# Patient Record
Sex: FEMALE | Race: BLACK OR AFRICAN AMERICAN | NOT HISPANIC OR LATINO | Employment: OTHER | URBAN - METROPOLITAN AREA
[De-identification: names, ages, dates, MRNs, and addresses within clinical notes are randomized per-mention and may not be internally consistent; named-entity substitution may affect disease eponyms.]

---

## 2019-01-04 ENCOUNTER — OFFICE VISIT (OUTPATIENT)
Dept: FAMILY MEDICINE CLINIC | Facility: CLINIC | Age: 49
End: 2019-01-04

## 2019-01-04 VITALS
DIASTOLIC BLOOD PRESSURE: 100 MMHG | WEIGHT: 212 LBS | HEART RATE: 98 BPM | BODY MASS INDEX: 41.4 KG/M2 | SYSTOLIC BLOOD PRESSURE: 124 MMHG | RESPIRATION RATE: 16 BRPM | TEMPERATURE: 98.8 F | OXYGEN SATURATION: 98 %

## 2019-01-04 DIAGNOSIS — I10 ESSENTIAL HYPERTENSION: ICD-10-CM

## 2019-01-04 DIAGNOSIS — H65.92 LEFT OTITIS MEDIA WITH EFFUSION: ICD-10-CM

## 2019-01-04 DIAGNOSIS — R82.998 LEUKOCYTES IN URINE: ICD-10-CM

## 2019-01-04 DIAGNOSIS — J30.9 ALLERGIC RHINITIS, UNSPECIFIED SEASONALITY, UNSPECIFIED TRIGGER: ICD-10-CM

## 2019-01-04 DIAGNOSIS — E11.9 TYPE 2 DIABETES MELLITUS WITHOUT COMPLICATION, WITHOUT LONG-TERM CURRENT USE OF INSULIN (HCC): ICD-10-CM

## 2019-01-04 DIAGNOSIS — R53.83 FATIGUE, UNSPECIFIED TYPE: Primary | ICD-10-CM

## 2019-01-04 PROBLEM — C50.919 BREAST CANCER: Status: ACTIVE | Noted: 2019-01-04

## 2019-01-04 PROBLEM — F41.9 ANXIETY AND DEPRESSION: Status: ACTIVE | Noted: 2019-01-04

## 2019-01-04 PROBLEM — H91.91 DEAFNESS IN RIGHT EAR: Status: ACTIVE | Noted: 2019-01-04

## 2019-01-04 PROBLEM — F41.1 GENERALIZED ANXIETY DISORDER: Status: ACTIVE | Noted: 2019-01-04

## 2019-01-04 PROBLEM — F32.A ANXIETY AND DEPRESSION: Status: ACTIVE | Noted: 2019-01-04

## 2019-01-04 LAB
ALBUMIN SERPL-MCNC: 4.95 G/DL (ref 3.2–4.8)
ALBUMIN/GLOB SERPL: 2.1 G/DL (ref 1.5–2.5)
ALP SERPL-CCNC: 113 U/L (ref 25–100)
ALT SERPL-CCNC: 45 U/L (ref 7–40)
AST SERPL-CCNC: 25 U/L (ref 0–33)
BASOPHILS # BLD AUTO: 0.02 10*3/MM3 (ref 0–0.2)
BASOPHILS NFR BLD AUTO: 0.3 % (ref 0–1)
BILIRUB BLD-MCNC: NEGATIVE MG/DL
BILIRUB SERPL-MCNC: 0.5 MG/DL (ref 0.3–1.2)
BUN SERPL-MCNC: 17 MG/DL (ref 9–23)
BUN/CREAT SERPL: 19.8 (ref 7–25)
CALCIUM SERPL-MCNC: 10 MG/DL (ref 8.7–10.4)
CHLORIDE SERPL-SCNC: 101 MMOL/L (ref 99–109)
CLARITY, POC: CLEAR
CO2 SERPL-SCNC: 30 MMOL/L (ref 20–31)
COLOR UR: YELLOW
CREAT SERPL-MCNC: 0.86 MG/DL (ref 0.6–1.3)
EOSINOPHIL # BLD AUTO: 0.09 10*3/MM3 (ref 0–0.3)
EOSINOPHIL NFR BLD AUTO: 1.3 % (ref 0–3)
ERYTHROCYTE [DISTWIDTH] IN BLOOD BY AUTOMATED COUNT: 13.1 % (ref 11.3–14.5)
GLOBULIN SER CALC-MCNC: 2.4 GM/DL
GLUCOSE SERPL-MCNC: 117 MG/DL (ref 70–100)
GLUCOSE UR STRIP-MCNC: NEGATIVE MG/DL
HCT VFR BLD AUTO: 43.3 % (ref 34.5–44)
HGB BLD-MCNC: 14.1 G/DL (ref 11.5–15.5)
IMM GRANULOCYTES # BLD AUTO: 0.03 10*3/MM3 (ref 0–0.03)
IMM GRANULOCYTES NFR BLD AUTO: 0.4 % (ref 0–0.6)
KETONES UR QL: NEGATIVE
LEUKOCYTE EST, POC: NEGATIVE
LYMPHOCYTES # BLD AUTO: 2.7 10*3/MM3 (ref 0.6–4.8)
LYMPHOCYTES NFR BLD AUTO: 38.8 % (ref 24–44)
MCH RBC QN AUTO: 28.4 PG (ref 27–31)
MCHC RBC AUTO-ENTMCNC: 32.6 G/DL (ref 32–36)
MCV RBC AUTO: 87.3 FL (ref 80–99)
MONOCYTES # BLD AUTO: 0.39 10*3/MM3 (ref 0–1)
MONOCYTES NFR BLD AUTO: 5.6 % (ref 0–12)
NEUTROPHILS # BLD AUTO: 3.72 10*3/MM3 (ref 1.5–8.3)
NEUTROPHILS NFR BLD AUTO: 53.6 % (ref 41–71)
NITRITE UR-MCNC: NEGATIVE MG/ML
PH UR: 6.5 [PH] (ref 5–8)
PLATELET # BLD AUTO: 319 10*3/MM3 (ref 150–450)
POC CREATININE URINE: NORMAL
POC MICROALBUMIN URINE: NORMAL
POTASSIUM SERPL-SCNC: 4.2 MMOL/L (ref 3.5–5.5)
PROT SERPL-MCNC: 7.3 G/DL (ref 5.7–8.2)
PROT UR STRIP-MCNC: NEGATIVE MG/DL
RBC # BLD AUTO: 4.96 10*6/MM3 (ref 3.89–5.14)
RBC # UR STRIP: NEGATIVE /UL
SODIUM SERPL-SCNC: 139 MMOL/L (ref 132–146)
SP GR UR: 1.02 (ref 1–1.03)
TSH SERPL DL<=0.005 MIU/L-ACNC: 1.99 MIU/ML (ref 0.35–5.35)
UROBILINOGEN UR QL: NORMAL
WBC # BLD AUTO: 6.95 10*3/MM3 (ref 3.5–10.8)

## 2019-01-04 PROCEDURE — 81003 URINALYSIS AUTO W/O SCOPE: CPT | Performed by: NURSE PRACTITIONER

## 2019-01-04 PROCEDURE — 82044 UR ALBUMIN SEMIQUANTITATIVE: CPT | Performed by: NURSE PRACTITIONER

## 2019-01-04 PROCEDURE — 99214 OFFICE O/P EST MOD 30 MIN: CPT | Performed by: NURSE PRACTITIONER

## 2019-01-04 PROCEDURE — 36415 COLL VENOUS BLD VENIPUNCTURE: CPT | Performed by: NURSE PRACTITIONER

## 2019-01-04 RX ORDER — LISINOPRIL 10 MG/1
10 TABLET ORAL DAILY
Qty: 30 TABLET | Refills: 1 | Status: SHIPPED | OUTPATIENT
Start: 2019-01-04 | End: 2019-02-06 | Stop reason: SDUPTHER

## 2019-01-04 RX ORDER — CETIRIZINE HYDROCHLORIDE 10 MG/1
10 TABLET ORAL DAILY
Qty: 30 TABLET | Refills: 11 | Status: SHIPPED | OUTPATIENT
Start: 2019-01-04 | End: 2020-02-05

## 2019-01-04 RX ORDER — AMOXICILLIN AND CLAVULANATE POTASSIUM 875; 125 MG/1; MG/1
1 TABLET, FILM COATED ORAL 2 TIMES DAILY
Qty: 20 TABLET | Refills: 0 | Status: SHIPPED | OUTPATIENT
Start: 2019-01-04 | End: 2019-01-14

## 2019-01-04 RX ORDER — TRETINOIN 0.1 MG/G
GEL TOPICAL
COMMUNITY
End: 2021-11-09

## 2019-01-04 RX ORDER — TIZANIDINE 4 MG/1
4 TABLET ORAL NIGHTLY PRN
Qty: 30 TABLET | Refills: 1 | Status: SHIPPED | OUTPATIENT
Start: 2019-01-04 | End: 2019-02-06 | Stop reason: SDUPTHER

## 2019-01-04 RX ORDER — DESONIDE 0.5 MG/G
CREAM TOPICAL
COMMUNITY
Start: 2013-06-30 | End: 2021-11-09

## 2019-01-04 RX ORDER — HYDROCHLOROTHIAZIDE 12.5 MG/1
CAPSULE, GELATIN COATED ORAL
Refills: 0 | COMMUNITY
Start: 2018-12-21 | End: 2019-01-04 | Stop reason: ALTCHOICE

## 2019-01-04 RX ORDER — DULAGLUTIDE 1.5 MG/.5ML
INJECTION, SOLUTION SUBCUTANEOUS
COMMUNITY
Start: 2018-12-30 | End: 2019-05-08 | Stop reason: ALTCHOICE

## 2019-01-04 RX ORDER — LANCETS
EACH MISCELLANEOUS
Refills: 11 | COMMUNITY
Start: 2018-10-03

## 2019-01-04 RX ORDER — FLUTICASONE PROPIONATE 50 MCG
2 SPRAY, SUSPENSION (ML) NASAL DAILY
Qty: 1 BOTTLE | Refills: 11 | Status: SHIPPED | OUTPATIENT
Start: 2019-01-04 | End: 2020-01-05

## 2019-01-04 RX ORDER — TRAZODONE HYDROCHLORIDE 50 MG/1
50 TABLET ORAL NIGHTLY
Qty: 30 TABLET | Refills: 1 | Status: SHIPPED | OUTPATIENT
Start: 2019-01-04 | End: 2019-02-06 | Stop reason: SDUPTHER

## 2019-01-04 NOTE — PROGRESS NOTES
"Teena Jacobson is a 48 y.o. female who presents for evaluation of hypertension and to establish care.    Chief Complaint   Patient presents with   • Hypertension       Patient's blood pressure has been elevated since August of 2018.  The highest reading that she is aware of was 170/110. She gets headaches and nausea occasionally. She has been under a lot of stress recently with her  and father being ill. She has had a lot of trouble sleeping and is having an increase in depression and anxiety. She gets very emotional and is actually tearful during the interview. She is also having difficulty with her allergies. Her nose and sinuses feel very inflamed and her ears feel \"full and stopped up\". She does not take anything for them on a daily basis.           Past Medical History:   Diagnosis Date   • Cancer (CMS/[facility])     breast, chemo, radiation done   • Diabetes mellitus (CMS/[facility])    • H/O mastectomy        Past Surgical History:   Procedure Laterality Date   • MASTECTOMY PARTIAL / LUMPECTOMY Left 2009       History reviewed. No pertinent family history.    Social History     Socioeconomic History   • Marital status:      Spouse name: Not on file   • Number of children: Not on file   • Years of education: Not on file   • Highest education level: Not on file   Social Needs   • Financial resource strain: Not on file   • Food insecurity - worry: Not on file   • Food insecurity - inability: Not on file   • Transportation needs - medical: Not on file   • Transportation needs - non-medical: Not on file   Occupational History   • Not on file   Tobacco Use   • Smoking status: Never Smoker   Substance and Sexual Activity   • Alcohol use: No   • Drug use: No   • Sexual activity: Defer   Other Topics Concern   • Not on file   Social History Narrative   • Not on file       No Known Allergies    ROS    Review of Systems   Constitutional: Positive for fatigue.   HENT: Positive for congestion, hearing loss, postnasal " drip, rhinorrhea, sinus pressure and sneezing.    Eyes: Positive for discharge, redness and itching.   Musculoskeletal: Positive for arthralgias, back pain and joint swelling.   Allergic/Immunologic: Positive for environmental allergies.   Neurological: Positive for light-headedness and headache.   Psychiatric/Behavioral: Positive for sleep disturbance, depressed mood and stress. The patient is nervous/anxious.    All other systems reviewed and are negative.      Vitals:    01/04/19 0906   BP: 124/100   Pulse: 98   Resp: 16   Temp: 98.8 °F (37.1 °C)   SpO2: 98%   Weight: 96.2 kg (212 lb)   PainSc: 0-No pain         Current Outpatient Medications:   •  desonide (DESOWEN) 0.05 % cream, desonide 0.05 % topical cream  APPLY TO AFFECTED AREA ON EYELIDS AND FACE BID X 2 WEEKS PRN FLARES, Disp: , Rfl:   •  metFORMIN ER (GLUCOPHAGE-XR) 750 MG 24 hr tablet, Take 750 mg by mouth Daily With Breakfast., Disp: , Rfl:   •  TRULICITY 1.5 MG/0.5ML solution pen-injector, , Disp: , Rfl:   •  ACCU-CHEK FASTCLIX LANCETS misc, USE TO TEST BLOOD GLUCOSE THREE TIMES DAILY, Disp: , Rfl: 11  •  amoxicillin-clavulanate (AUGMENTIN) 875-125 MG per tablet, Take 1 tablet by mouth 2 (Two) Times a Day for 10 days., Disp: 20 tablet, Rfl: 0  •  cetirizine (zyrTEC) 10 MG tablet, Take 1 tablet by mouth Daily., Disp: 30 tablet, Rfl: 11  •  fluticasone (FLONASE) 50 MCG/ACT nasal spray, 2 sprays into the nostril(s) as directed by provider Daily., Disp: 1 bottle, Rfl: 11  •  lisinopril (PRINIVIL,ZESTRIL) 10 MG tablet, Take 1 tablet by mouth Daily., Disp: 30 tablet, Rfl: 1  •  metFORMIN (GLUCOPHAGE) 1000 MG tablet, Glucophage  Two times a day, Disp: , Rfl:   •  neomycin-polymyxin-hydrocortisone (CORTISPORIN) 3.5-02132-5 otic solution, Administer 3 drops into the left ear 3 (Three) Times a Day., Disp: 10 mL, Rfl: 0  •  tiZANidine (ZANAFLEX) 4 MG tablet, Take 1 tablet by mouth At Night As Needed for Muscle Spasms., Disp: 30 tablet, Rfl: 1  •  traZODone  (DESYREL) 50 MG tablet, Take 1 tablet by mouth Every Night., Disp: 30 tablet, Rfl: 1  •  tretinoin (RETIN-A) 0.01 % gel, tretinoin 0.01 % topical gel  APPLY TO THE AFFECTED AREA(S) ON FACE BY TOPICAL ROUTE ONCE DAILY AT BEDTIME, Disp: , Rfl:     PE    Physical Exam   Constitutional: She is oriented to person, place, and time. She appears well-developed. She is obese.  HENT:   Head: Normocephalic and atraumatic.   Right Ear: External ear normal. Decreased hearing is noted. cerumen impaction is present.  Left Ear: External ear normal. There is tenderness. Tympanic membrane is erythematous and bulging. A middle ear effusion is present.   Nose: Mucosal edema, rhinorrhea and congestion present.   Mouth/Throat: Uvula is midline and mucous membranes are normal. Posterior oropharyngeal erythema present.   Nasal mucosa is extremely erythematous and edematous left greater than right.   Eyes: Conjunctivae and EOM are normal. Pupils are equal, round, and reactive to light.   Neck: Normal range of motion. Neck supple.   Cardiovascular: Normal rate, regular rhythm, normal heart sounds and intact distal pulses.   Pulmonary/Chest: Effort normal and breath sounds normal. No stridor. No respiratory distress. She has no wheezes. She has no rales.   Lymphadenopathy:     She has cervical adenopathy.   Neurological: She is alert and oriented to person, place, and time.   Skin: Skin is warm and dry. Capillary refill takes less than 2 seconds. Turgor is normal.   Psychiatric: Her speech is normal and behavior is normal. Judgment and thought content normal. Her mood appears anxious. She exhibits a depressed mood.   Nursing note and vitals reviewed.       SMITA/P    Teena was seen today for hypertension.    Diagnoses and all orders for this visit:    Fatigue, unspecified type  -     TSH  -     Comprehensive Metabolic Panel  -     CBC Auto Differential    Essential hypertension  -     lisinopril (PRINIVIL,ZESTRIL) 10 MG tablet; Take 1 tablet by  mouth Daily.    Type 2 diabetes mellitus without complication, without long-term current use of insulin (CMS/[facility])  -     Cancel: MicroAlbumin, Urine, Random - Urine, Clean Catch    Leukocytes in urine  -     POCT urinalysis dipstick, automated  -     POCT microalbumin    Allergic rhinitis, unspecified seasonality, unspecified trigger  -     fluticasone (FLONASE) 50 MCG/ACT nasal spray; 2 sprays into the nostril(s) as directed by provider Daily.  -     cetirizine (zyrTEC) 10 MG tablet; Take 1 tablet by mouth Daily.    Left otitis media with effusion  -     amoxicillin-clavulanate (AUGMENTIN) 875-125 MG per tablet; Take 1 tablet by mouth 2 (Two) Times a Day for 10 days.  -     neomycin-polymyxin-hydrocortisone (CORTISPORIN) 3.5-43753-4 otic solution; Administer 3 drops into the left ear 3 (Three) Times a Day.    Other orders  -     tiZANidine (ZANAFLEX) 4 MG tablet; Take 1 tablet by mouth At Night As Needed for Muscle Spasms.  -     traZODone (DESYREL) 50 MG tablet; Take 1 tablet by mouth Every Night.        Plan of care reviewed with patient at the conclusion of today's visit. Education was provided regarding diagnosis, management and any prescribed or recommended OTC medications.  Patient verbalizes understanding of and agreement with management plan.    Return in about 4 weeks (around 2/1/2019) for Recheck.     JAGDEEP Kan

## 2019-01-08 DIAGNOSIS — B37.31 VAGINAL CANDIDIASIS: Primary | ICD-10-CM

## 2019-01-08 RX ORDER — FLUCONAZOLE 200 MG/1
TABLET ORAL
Qty: 2 TABLET | Refills: 0 | Status: SHIPPED | OUTPATIENT
Start: 2019-01-08 | End: 2019-02-06

## 2019-01-09 ENCOUNTER — TELEPHONE (OUTPATIENT)
Dept: FAMILY MEDICINE CLINIC | Facility: CLINIC | Age: 49
End: 2019-01-09

## 2019-01-09 NOTE — TELEPHONE ENCOUNTER
----- Message from JAGDEEP Tobias sent at 1/8/2019  7:05 PM EST -----  Regarding: RE: Yeast infection  Please call patient and let her know that I called this in for her.  Thanks  ----- Message -----  From: Carol Lee MA  Sent: 1/7/2019   1:32 PM  To: JAGDEEP Tobias  Subject: Yeast infection                                  Pt was in on Friday and was prescribed an antibiotic and now needs something for yeast infection.  Please send to pharmacy in chart    Pt notified that an rx has been sent in.  BBailey, com

## 2019-02-06 ENCOUNTER — OFFICE VISIT (OUTPATIENT)
Dept: FAMILY MEDICINE CLINIC | Facility: CLINIC | Age: 49
End: 2019-02-06

## 2019-02-06 VITALS
HEIGHT: 60 IN | WEIGHT: 212.16 LBS | RESPIRATION RATE: 16 BRPM | BODY MASS INDEX: 41.65 KG/M2 | OXYGEN SATURATION: 97 % | SYSTOLIC BLOOD PRESSURE: 118 MMHG | DIASTOLIC BLOOD PRESSURE: 80 MMHG | TEMPERATURE: 98.3 F | HEART RATE: 105 BPM

## 2019-02-06 DIAGNOSIS — I10 ESSENTIAL HYPERTENSION: ICD-10-CM

## 2019-02-06 DIAGNOSIS — M62.838 MUSCLE SPASM: ICD-10-CM

## 2019-02-06 DIAGNOSIS — G47.00 INSOMNIA, UNSPECIFIED TYPE: ICD-10-CM

## 2019-02-06 DIAGNOSIS — J01.00 ACUTE NON-RECURRENT MAXILLARY SINUSITIS: ICD-10-CM

## 2019-02-06 DIAGNOSIS — R09.81 SINUS CONGESTION: ICD-10-CM

## 2019-02-06 DIAGNOSIS — J30.9 ALLERGIC RHINITIS, UNSPECIFIED SEASONALITY, UNSPECIFIED TRIGGER: Primary | ICD-10-CM

## 2019-02-06 PROCEDURE — 99213 OFFICE O/P EST LOW 20 MIN: CPT | Performed by: NURSE PRACTITIONER

## 2019-02-06 RX ORDER — MONTELUKAST SODIUM 10 MG/1
10 TABLET ORAL NIGHTLY
Qty: 30 TABLET | Refills: 2 | Status: SHIPPED | OUTPATIENT
Start: 2019-02-06 | End: 2019-10-03 | Stop reason: SDUPTHER

## 2019-02-06 RX ORDER — LISINOPRIL 10 MG/1
10 TABLET ORAL DAILY
Qty: 30 TABLET | Refills: 5 | Status: SHIPPED | OUTPATIENT
Start: 2019-02-06 | End: 2019-04-23 | Stop reason: DRUGHIGH

## 2019-02-06 RX ORDER — TRAZODONE HYDROCHLORIDE 50 MG/1
50 TABLET ORAL NIGHTLY
Qty: 30 TABLET | Refills: 5 | Status: SHIPPED | OUTPATIENT
Start: 2019-02-06 | End: 2019-10-03 | Stop reason: SDUPTHER

## 2019-02-06 RX ORDER — TIZANIDINE 4 MG/1
4 TABLET ORAL NIGHTLY PRN
Qty: 30 TABLET | Refills: 5 | Status: SHIPPED | OUTPATIENT
Start: 2019-02-06 | End: 2019-10-03 | Stop reason: SDUPTHER

## 2019-02-06 RX ORDER — METFORMIN HYDROCHLORIDE 500 MG/1
TABLET, EXTENDED RELEASE ORAL
COMMUNITY
Start: 2019-02-04 | End: 2019-10-03 | Stop reason: SDUPTHER

## 2019-02-06 RX ORDER — PSEUDOEPHEDRINE HYDROCHLORIDE 60 MG/1
60 TABLET, FILM COATED ORAL EVERY 4 HOURS PRN
Qty: 30 TABLET | Refills: 2 | Status: SHIPPED | OUTPATIENT
Start: 2019-02-06 | End: 2019-06-18

## 2019-02-06 NOTE — PROGRESS NOTES
"Teena Jacobson is a 49 y.o. female who presents for follow up of hypertension and allergic rhinitis.    Chief Complaint   Patient presents with   • Hypertension       Patient states that her blood pressure has been much better and she has felt better. Her allergy symptoms have been giving her problems. She states that she has good days and bad days and sometimes feels like the medication is not helping and other days is helps more.          Past Medical History:   Diagnosis Date   • Cancer (CMS/[facility])     breast, chemo, radiation done   • Diabetes mellitus (CMS/[facility])    • H/O mastectomy        Past Surgical History:   Procedure Laterality Date   • MASTECTOMY PARTIAL / LUMPECTOMY Left 2009       History reviewed. No pertinent family history.    Social History     Socioeconomic History   • Marital status:      Spouse name: Not on file   • Number of children: Not on file   • Years of education: Not on file   • Highest education level: Not on file   Social Needs   • Financial resource strain: Not on file   • Food insecurity - worry: Not on file   • Food insecurity - inability: Not on file   • Transportation needs - medical: Not on file   • Transportation needs - non-medical: Not on file   Occupational History   • Not on file   Tobacco Use   • Smoking status: Never Smoker   • Smokeless tobacco: Never Used   Substance and Sexual Activity   • Alcohol use: No   • Drug use: No   • Sexual activity: Defer   Other Topics Concern   • Not on file   Social History Narrative   • Not on file       No Known Allergies    ROS    Review of Systems   HENT: Positive for congestion, postnasal drip, rhinorrhea and sneezing.    Eyes: Positive for discharge.   All other systems reviewed and are negative.      Vitals:    02/06/19 1355   BP: 118/80   Pulse: 105   Resp: 16   Temp: 98.3 °F (36.8 °C)   TempSrc: Oral   SpO2: 97%   Weight: 96.2 kg (212 lb 2.6 oz)   Height: 152.4 cm (60\")   PainSc: 0-No pain         Current Outpatient " Medications:   •  ACCU-CHEK FASTCLIX LANCETS misc, USE TO TEST BLOOD GLUCOSE THREE TIMES DAILY, Disp: , Rfl: 11  •  cetirizine (zyrTEC) 10 MG tablet, Take 1 tablet by mouth Daily., Disp: 30 tablet, Rfl: 11  •  desonide (DESOWEN) 0.05 % cream, desonide 0.05 % topical cream  APPLY TO AFFECTED AREA ON EYELIDS AND FACE BID X 2 WEEKS PRN FLARES, Disp: , Rfl:   •  fluticasone (FLONASE) 50 MCG/ACT nasal spray, 2 sprays into the nostril(s) as directed by provider Daily., Disp: 1 bottle, Rfl: 11  •  lisinopril (PRINIVIL,ZESTRIL) 10 MG tablet, Take 1 tablet by mouth Daily., Disp: 30 tablet, Rfl: 5  •  neomycin-polymyxin-hydrocortisone (CORTISPORIN) 3.5-51385-6 otic solution, Administer 3 drops into the left ear 3 (Three) Times a Day., Disp: 10 mL, Rfl: 0  •  tiZANidine (ZANAFLEX) 4 MG tablet, Take 1 tablet by mouth At Night As Needed for Muscle Spasms., Disp: 30 tablet, Rfl: 5  •  traZODone (DESYREL) 50 MG tablet, Take 1 tablet by mouth Every Night., Disp: 30 tablet, Rfl: 5  •  tretinoin (RETIN-A) 0.01 % gel, tretinoin 0.01 % topical gel  APPLY TO THE AFFECTED AREA(S) ON FACE BY TOPICAL ROUTE ONCE DAILY AT BEDTIME, Disp: , Rfl:   •  TRULICITY 1.5 MG/0.5ML solution pen-injector, , Disp: , Rfl:   •  metFORMIN ER (GLUCOPHAGE-XR) 500 MG 24 hr tablet, , Disp: , Rfl:   •  montelukast (SINGULAIR) 10 MG tablet, Take 1 tablet by mouth Every Night., Disp: 30 tablet, Rfl: 2  •  pseudoephedrine (SUDAFED) 60 MG tablet, Take 1 tablet by mouth Every 4 (Four) Hours As Needed for Congestion., Disp: 30 tablet, Rfl: 2    PE    Physical Exam   Constitutional: She is oriented to person, place, and time. She appears well-developed.   HENT:   Head: Normocephalic and atraumatic.   Right Ear: Tympanic membrane, external ear and ear canal normal.   Left Ear: Tympanic membrane, external ear and ear canal normal.   Nose: Mucosal edema, rhinorrhea and congestion present.   Mouth/Throat: Mucous membranes are normal. Posterior oropharyngeal erythema  present.   Neck: Normal range of motion. Neck supple.   Cardiovascular: Normal rate, regular rhythm and normal heart sounds. Exam reveals no gallop and no friction rub.   No murmur heard.  Pulmonary/Chest: Effort normal and breath sounds normal. No stridor. No respiratory distress. She has no wheezes. She has no rales.   Neurological: She is alert and oriented to person, place, and time.   Skin: Skin is warm and dry. Capillary refill takes less than 2 seconds.   Psychiatric: She has a normal mood and affect. Her behavior is normal. Judgment and thought content normal.   Nursing note and vitals reviewed.       A/P    Teena was seen today for hypertension.    Diagnoses and all orders for this visit:    Allergic rhinitis, unspecified seasonality, unspecified trigger  -     montelukast (SINGULAIR) 10 MG tablet; Take 1 tablet by mouth Every Night.    Sinus congestion  -     pseudoephedrine (SUDAFED) 60 MG tablet; Take 1 tablet by mouth Every 4 (Four) Hours As Needed for Congestion.    Essential hypertension  -     lisinopril (PRINIVIL,ZESTRIL) 10 MG tablet; Take 1 tablet by mouth Daily.    Insomnia, unspecified type  -     traZODone (DESYREL) 50 MG tablet; Take 1 tablet by mouth Every Night.    Muscle spasm  -     tiZANidine (ZANAFLEX) 4 MG tablet; Take 1 tablet by mouth At Night As Needed for Muscle Spasms.      Plan of care reviewed with patient at the conclusion of today's visit. Education was provided regarding diagnosis, management and any prescribed or recommended OTC medications.  Patient verbalizes understanding of and agreement with management plan.    Return in about 3 months (around 5/6/2019) for Recheck.     JAGDEEP Kan

## 2019-02-20 ENCOUNTER — TELEPHONE (OUTPATIENT)
Dept: FAMILY MEDICINE CLINIC | Facility: CLINIC | Age: 49
End: 2019-02-20

## 2019-02-20 RX ORDER — CEFDINIR 300 MG/1
300 CAPSULE ORAL 2 TIMES DAILY
Qty: 20 CAPSULE | Refills: 0 | Status: SHIPPED | OUTPATIENT
Start: 2019-02-20 | End: 2019-03-02

## 2019-02-20 NOTE — TELEPHONE ENCOUNTER
Left message for pt to call back. Just calling pt to let her know antibiotic was sent to pharmacy.

## 2019-04-23 ENCOUNTER — OFFICE VISIT (OUTPATIENT)
Dept: FAMILY MEDICINE CLINIC | Facility: CLINIC | Age: 49
End: 2019-04-23

## 2019-04-23 VITALS
HEIGHT: 60 IN | SYSTOLIC BLOOD PRESSURE: 142 MMHG | HEART RATE: 95 BPM | WEIGHT: 208.2 LBS | RESPIRATION RATE: 15 BRPM | DIASTOLIC BLOOD PRESSURE: 80 MMHG | TEMPERATURE: 98.8 F | OXYGEN SATURATION: 98 % | BODY MASS INDEX: 40.88 KG/M2

## 2019-04-23 DIAGNOSIS — J01.00 ACUTE MAXILLARY SINUSITIS, RECURRENCE NOT SPECIFIED: ICD-10-CM

## 2019-04-23 DIAGNOSIS — I10 ESSENTIAL HYPERTENSION: ICD-10-CM

## 2019-04-23 DIAGNOSIS — R51.9 INTRACTABLE HEADACHE, UNSPECIFIED CHRONICITY PATTERN, UNSPECIFIED HEADACHE TYPE: ICD-10-CM

## 2019-04-23 DIAGNOSIS — E11.9 TYPE 2 DIABETES MELLITUS WITHOUT COMPLICATION, WITHOUT LONG-TERM CURRENT USE OF INSULIN (HCC): ICD-10-CM

## 2019-04-23 DIAGNOSIS — J30.9 ALLERGIC RHINITIS, UNSPECIFIED SEASONALITY, UNSPECIFIED TRIGGER: Primary | ICD-10-CM

## 2019-04-23 LAB
GLUCOSE BLDC GLUCOMTR-MCNC: 72 MG/DL (ref 70–130)
Lab: NORMAL

## 2019-04-23 PROCEDURE — 96372 THER/PROPH/DIAG INJ SC/IM: CPT | Performed by: NURSE PRACTITIONER

## 2019-04-23 PROCEDURE — 82962 GLUCOSE BLOOD TEST: CPT | Performed by: NURSE PRACTITIONER

## 2019-04-23 PROCEDURE — 99214 OFFICE O/P EST MOD 30 MIN: CPT | Performed by: NURSE PRACTITIONER

## 2019-04-23 RX ORDER — LISINOPRIL 10 MG/1
TABLET ORAL
COMMUNITY
End: 2019-04-23 | Stop reason: SDUPTHER

## 2019-04-23 RX ORDER — AZELASTINE 1 MG/ML
2 SPRAY, METERED NASAL 2 TIMES DAILY
Qty: 1 EACH | Refills: 12 | Status: SHIPPED | OUTPATIENT
Start: 2019-04-23 | End: 2020-09-01

## 2019-04-23 RX ORDER — LISINOPRIL 5 MG/1
5 TABLET ORAL DAILY
Qty: 30 TABLET | Refills: 2 | Status: SHIPPED | OUTPATIENT
Start: 2019-04-23 | End: 2019-06-18 | Stop reason: DRUGHIGH

## 2019-04-23 RX ORDER — AMLODIPINE BESYLATE 10 MG/1
10 TABLET ORAL DAILY
Qty: 30 TABLET | Refills: 2 | Status: SHIPPED | OUTPATIENT
Start: 2019-04-23 | End: 2019-07-12 | Stop reason: SDUPTHER

## 2019-04-23 RX ORDER — DEXAMETHASONE SODIUM PHOSPHATE 4 MG/ML
4 INJECTION, SOLUTION INTRA-ARTICULAR; INTRALESIONAL; INTRAMUSCULAR; INTRAVENOUS; SOFT TISSUE ONCE
Status: COMPLETED | OUTPATIENT
Start: 2019-04-23 | End: 2019-04-23

## 2019-04-23 RX ORDER — TACROLIMUS 1 MG/G
1 OINTMENT TOPICAL 2 TIMES DAILY
COMMUNITY
End: 2021-11-09

## 2019-04-23 RX ADMIN — DEXAMETHASONE SODIUM PHOSPHATE 4 MG: 4 INJECTION, SOLUTION INTRA-ARTICULAR; INTRALESIONAL; INTRAMUSCULAR; INTRAVENOUS; SOFT TISSUE at 16:51

## 2019-04-23 NOTE — PROGRESS NOTES
"Teena Jacobson is a 49 y.o. female who presents for follow up of hypertension and headaches.    Chief Complaint   Patient presents with   • Hypertension   • Headache   • URI       Patient has been having intermittent headaches for the past 3 weeks. She states that she cannot really associate them with any particular trigger. She is having an exacerbation of her allergic rhinitis right now. Her sinuses are congested but she is not really having rhinorrhea. She is also seeing the nutritionist and has started a new diet to lose weight. She has lost approx 6 pounds and her blood sugar has decreased. Her blood pressure has also been elevated the past couple of weeks when she has checked it. It has been running around 150/100.           No Known Allergies    Health Maintenance   Topic Date Due   • ANNUAL PHYSICAL  02/05/1972   • PNEUMOCOCCAL VACCINE (19-64 MEDIUM RISK) (1 of 1 - PPSV23) 02/05/1988   • PAP SMEAR  01/04/2019   • COLONOSCOPY  01/04/2019   • ZOSTER VACCINE (1 of 2) 02/05/2019   • HEMOGLOBIN A1C  04/30/2019   • TDAP/TD VACCINES (1 - Tdap) 11/02/2026 (Originally 2/5/1988)   • INFLUENZA VACCINE  08/01/2019   • DIABETIC EYE EXAM  09/03/2019   • DIABETIC FOOT EXAM  10/30/2019   • URINE MICROALBUMIN  01/04/2020        ROS    Review of Systems   Constitutional: Positive for fatigue.   HENT: Positive for congestion.    Neurological: Positive for light-headedness and headache.   Psychiatric/Behavioral: Positive for sleep disturbance.       Vitals:    04/23/19 1545   BP: 142/80   Pulse: 95   Resp: 15   Temp: 98.8 °F (37.1 °C)   TempSrc: Oral   SpO2: 98%   Weight: 94.4 kg (208 lb 3.2 oz)   Height: 152.4 cm (60\")   PainSc:   4         Current Outpatient Medications:   •  ACCU-CHEK FASTCLIX LANCETS misc, USE TO TEST BLOOD GLUCOSE THREE TIMES DAILY, Disp: , Rfl: 11  •  cetirizine (zyrTEC) 10 MG tablet, Take 1 tablet by mouth Daily., Disp: 30 tablet, Rfl: 11  •  fluticasone (FLONASE) 50 MCG/ACT nasal spray, 2 sprays into " the nostril(s) as directed by provider Daily., Disp: 1 bottle, Rfl: 11  •  metFORMIN ER (GLUCOPHAGE-XR) 500 MG 24 hr tablet, , Disp: , Rfl:   •  montelukast (SINGULAIR) 10 MG tablet, Take 1 tablet by mouth Every Night., Disp: 30 tablet, Rfl: 2  •  neomycin-polymyxin-hydrocortisone (CORTISPORIN) 3.5-56849-1 otic solution, Administer 3 drops into the left ear 3 (Three) Times a Day., Disp: 10 mL, Rfl: 0  •  pseudoephedrine (SUDAFED) 60 MG tablet, Take 1 tablet by mouth Every 4 (Four) Hours As Needed for Congestion., Disp: 30 tablet, Rfl: 2  •  tiZANidine (ZANAFLEX) 4 MG tablet, Take 1 tablet by mouth At Night As Needed for Muscle Spasms., Disp: 30 tablet, Rfl: 5  •  traZODone (DESYREL) 50 MG tablet, Take 1 tablet by mouth Every Night., Disp: 30 tablet, Rfl: 5  •  tretinoin (RETIN-A) 0.01 % gel, tretinoin 0.01 % topical gel  APPLY TO THE AFFECTED AREA(S) ON FACE BY TOPICAL ROUTE ONCE DAILY AT BEDTIME, Disp: , Rfl:   •  TRULICITY 1.5 MG/0.5ML solution pen-injector, , Disp: , Rfl:   •  amLODIPine (NORVASC) 10 MG tablet, Take 1 tablet by mouth Daily., Disp: 30 tablet, Rfl: 2  •  azelastine (ASTELIN) 0.1 % nasal spray, 2 sprays into the nostril(s) as directed by provider 2 (Two) Times a Day. Use in each nostril as directed, Disp: 1 each, Rfl: 12  •  desonide (DESOWEN) 0.05 % cream, desonide 0.05 % topical cream  APPLY TO AFFECTED AREA ON EYELIDS AND FACE BID X 2 WEEKS PRN FLARES, Disp: , Rfl:   •  hydrocortisone 2.5 % cream, APPLY A THIN LAYER TO THE AFFECTED AREA(S) ON THE FACE TOPICALLY TWICE DAILY FOR 2 WEEKS AS NEEDED FOR FLARES, Disp: , Rfl: 3  •  lisinopril (PRINIVIL,ZESTRIL) 5 MG tablet, Take 1 tablet by mouth Daily., Disp: 30 tablet, Rfl: 2  •  tacrolimus (PROTOPIC) 0.1 % ointment, Apply 1 application topically to the appropriate area as directed 2 (Two) Times a Day., Disp: , Rfl:     PE    Physical Exam   Constitutional: She is oriented to person, place, and time. She appears well-developed and well-nourished.    HENT:   Head: Normocephalic and atraumatic.   Right Ear: Tympanic membrane, external ear and ear canal normal.   Left Ear: Tympanic membrane, external ear and ear canal normal.   Nose: Mucosal edema, sinus tenderness and congestion present. Right sinus exhibits frontal sinus tenderness. Left sinus exhibits frontal sinus tenderness.   Eyes: Conjunctivae and EOM are normal. Pupils are equal, round, and reactive to light.   Cardiovascular: Normal rate, regular rhythm and normal heart sounds. Exam reveals no gallop and no friction rub.   No murmur heard.  Pulmonary/Chest: Effort normal and breath sounds normal. No stridor. No respiratory distress. She has no wheezes. She has no rales.   Neurological: She is alert and oriented to person, place, and time.   Skin: Skin is warm and dry. Capillary refill takes less than 2 seconds.   Psychiatric: She has a normal mood and affect. Her behavior is normal. Judgment and thought content normal.   Nursing note and vitals reviewed.       A/P    Problem List Items Addressed This Visit        Cardiovascular and Mediastinum    Essential hypertension    Relevant Medications    amLODIPine (NORVASC) 10 MG tablet    lisinopril (PRINIVIL,ZESTRIL) 5 MG tablet       Respiratory    Allergic rhinitis - Primary    Relevant Medications    fluticasone (FLONASE) 50 MCG/ACT nasal spray    cetirizine (zyrTEC) 10 MG tablet    montelukast (SINGULAIR) 10 MG tablet    azelastine (ASTELIN) 0.1 % nasal spray       Endocrine    Type 2 diabetes mellitus without complication, without long-term current use of insulin (CMS/[facility])    Relevant Medications    TRULICITY 1.5 MG/0.5ML solution pen-injector    metFORMIN ER (GLUCOPHAGE-XR) 500 MG 24 hr tablet    Other Relevant Orders    POCT Glucose (Completed)      Other Visit Diagnoses     Acute maxillary sinusitis, recurrence not specified        Relevant Medications    dexamethasone (DECADRON) injection 4 mg (Completed)    Intractable headache, unspecified  chronicity pattern, unspecified headache type            Fingerstick glucose today at visit was 72.     Medication Changes:   Decreased Lisinopril to 5mg daily and started Amlodipine 5mg daily.    Started Astelin nasal spray for allergic rhinitis.  Decrease Metformin to 500mg BID until she sees her endocrinologist due to hypoglycemia.    Plan of care reviewed with patient at the conclusion of today's visit. Education was provided regarding diagnosis, management and any prescribed or recommended OTC medications.  Patient verbalizes understanding of and agreement with management plan.    Return in about 3 weeks (around 5/14/2019) for Recheck.     Marielos Borrero, APRN

## 2019-05-08 ENCOUNTER — OFFICE VISIT (OUTPATIENT)
Dept: FAMILY MEDICINE CLINIC | Facility: CLINIC | Age: 49
End: 2019-05-08

## 2019-05-08 VITALS
WEIGHT: 205.4 LBS | TEMPERATURE: 97.2 F | OXYGEN SATURATION: 96 % | DIASTOLIC BLOOD PRESSURE: 82 MMHG | SYSTOLIC BLOOD PRESSURE: 124 MMHG | HEART RATE: 100 BPM | HEIGHT: 60 IN | RESPIRATION RATE: 24 BRPM | BODY MASS INDEX: 40.33 KG/M2

## 2019-05-08 DIAGNOSIS — E11.9 TYPE 2 DIABETES MELLITUS WITHOUT COMPLICATION, WITHOUT LONG-TERM CURRENT USE OF INSULIN (HCC): Primary | ICD-10-CM

## 2019-05-08 LAB
HBA1C MFR BLD: 5.8 %
Lab: NORMAL

## 2019-05-08 PROCEDURE — 99213 OFFICE O/P EST LOW 20 MIN: CPT | Performed by: NURSE PRACTITIONER

## 2019-05-08 PROCEDURE — 83036 HEMOGLOBIN GLYCOSYLATED A1C: CPT | Performed by: NURSE PRACTITIONER

## 2019-05-08 NOTE — PROGRESS NOTES
"Teena Jacobson is a 49 y.o. female who presents for follow up of diabetes.    Chief Complaint   Patient presents with   • Diabetes   • Fatigue   • Knee Pain     left       Patient states that she has been having some episodes of hypoglycemia lately and is wondering if her medication is correct. She does not have an appointment with endocrinology until September. She get shaky, nervous, light-headed and has difficulty concentrating. She states that she takes her Trulicity and then by the second day she feels really bad and feels like her blood sugar is dropping too low. She notes that she has not been able to always remember to check her blood sugars before she eats. Her last visit here in the office her blood sugar was 72. She would also like to know if her diabetes could be managed from this office instead of having to go to endocrinology.           No Known Allergies    Health Maintenance   Topic Date Due   • ANNUAL PHYSICAL  02/05/1972   • PNEUMOCOCCAL VACCINE (19-64 MEDIUM RISK) (1 of 1 - PPSV23) 02/05/1988   • PAP SMEAR  01/04/2019   • COLONOSCOPY  01/04/2019   • ZOSTER VACCINE (1 of 2) 02/05/2019   • TDAP/TD VACCINES (1 - Tdap) 11/02/2026 (Originally 2/5/1988)   • INFLUENZA VACCINE  08/01/2019   • DIABETIC EYE EXAM  09/03/2019   • DIABETIC FOOT EXAM  10/30/2019   • HEMOGLOBIN A1C  11/08/2019   • URINE MICROALBUMIN  01/04/2020        ROS    Review of Systems   Neurological: Positive for light-headedness.   Psychiatric/Behavioral: The patient is nervous/anxious.    All other systems reviewed and are negative.      Vitals:    05/08/19 1359   BP: 124/82   Pulse: 100   Resp: 24   Temp: 97.2 °F (36.2 °C)   TempSrc: Temporal   SpO2: 96%   Weight: 93.2 kg (205 lb 6.4 oz)   Height: 152.4 cm (60\")   PainSc:   6   PainLoc: Knee  Comment: left         Current Outpatient Medications:   •  amLODIPine (NORVASC) 10 MG tablet, Take 1 tablet by mouth Daily., Disp: 30 tablet, Rfl: 2  •  cetirizine (zyrTEC) 10 MG tablet, Take 1 " tablet by mouth Daily., Disp: 30 tablet, Rfl: 11  •  fluticasone (FLONASE) 50 MCG/ACT nasal spray, 2 sprays into the nostril(s) as directed by provider Daily., Disp: 1 bottle, Rfl: 11  •  hydrocortisone 2.5 % cream, APPLY A THIN LAYER TO THE AFFECTED AREA(S) ON THE FACE TOPICALLY TWICE DAILY FOR 2 WEEKS AS NEEDED FOR FLARES, Disp: , Rfl: 3  •  lisinopril (PRINIVIL,ZESTRIL) 5 MG tablet, Take 1 tablet by mouth Daily., Disp: 30 tablet, Rfl: 2  •  metFORMIN ER (GLUCOPHAGE-XR) 500 MG 24 hr tablet, , Disp: , Rfl:   •  pseudoephedrine (SUDAFED) 60 MG tablet, Take 1 tablet by mouth Every 4 (Four) Hours As Needed for Congestion., Disp: 30 tablet, Rfl: 2  •  tiZANidine (ZANAFLEX) 4 MG tablet, Take 1 tablet by mouth At Night As Needed for Muscle Spasms., Disp: 30 tablet, Rfl: 5  •  tretinoin (RETIN-A) 0.01 % gel, tretinoin 0.01 % topical gel  APPLY TO THE AFFECTED AREA(S) ON FACE BY TOPICAL ROUTE ONCE DAILY AT BEDTIME, Disp: , Rfl:   •  ACCU-CHEK FASTCLIX LANCETS misc, USE TO TEST BLOOD GLUCOSE THREE TIMES DAILY, Disp: , Rfl: 11  •  azelastine (ASTELIN) 0.1 % nasal spray, 2 sprays into the nostril(s) as directed by provider 2 (Two) Times a Day. Use in each nostril as directed, Disp: 1 each, Rfl: 12  •  desonide (DESOWEN) 0.05 % cream, desonide 0.05 % topical cream  APPLY TO AFFECTED AREA ON EYELIDS AND FACE BID X 2 WEEKS PRN FLARES, Disp: , Rfl:   •  montelukast (SINGULAIR) 10 MG tablet, Take 1 tablet by mouth Every Night., Disp: 30 tablet, Rfl: 2  •  neomycin-polymyxin-hydrocortisone (CORTISPORIN) 3.5-81095-9 otic solution, Administer 3 drops into the left ear 3 (Three) Times a Day., Disp: 10 mL, Rfl: 0  •  tacrolimus (PROTOPIC) 0.1 % ointment, Apply 1 application topically to the appropriate area as directed 2 (Two) Times a Day., Disp: , Rfl:   •  traZODone (DESYREL) 50 MG tablet, Take 1 tablet by mouth Every Night., Disp: 30 tablet, Rfl: 5    PE    Physical Exam   Constitutional: She is oriented to person, place, and time.  She appears well-developed and well-nourished.   HENT:   Head: Normocephalic and atraumatic.   Cardiovascular: Normal rate, regular rhythm and normal heart sounds. Exam reveals no gallop and no friction rub.   No murmur heard.  Pulmonary/Chest: Effort normal and breath sounds normal. No stridor. No respiratory distress. She has no wheezes. She has no rales.   Neurological: She is alert and oriented to person, place, and time.   Skin: Skin is warm and dry.   Psychiatric: She has a normal mood and affect. Her behavior is normal. Judgment and thought content normal.   Nursing note and vitals reviewed.       A/P    Problem List Items Addressed This Visit        Endocrine    Type 2 diabetes mellitus without complication, without long-term current use of insulin (CMS/[facility]) - Primary    Relevant Medications    metFORMIN ER (GLUCOPHAGE-XR) 500 MG 24 hr tablet    Other Relevant Orders    POC Glycosylated Hemoglobin (Hb A1C) (Completed)        Discontinue Trulicity and begin Jardiance 25mg daily. Samples of Jardiance 25mg given today for patient to try.    Hemoglobin A1c is 5.8 today.     Plan of care reviewed with patient at the conclusion of today's visit. Education was provided regarding diagnosis, management and any prescribed or recommended OTC medications.  Patient verbalizes understanding of and agreement with management plan.    Return in about 3 months (around 8/8/2019) for diabetes Recheck.     JAGDEEP Kan

## 2019-06-13 ENCOUNTER — TELEPHONE (OUTPATIENT)
Dept: FAMILY MEDICINE CLINIC | Facility: CLINIC | Age: 49
End: 2019-06-13

## 2019-06-13 NOTE — TELEPHONE ENCOUNTER
----- Message from JAGDEEP Tobias sent at 6/12/2019 11:59 PM EDT -----  Regarding: RE: MED CHANGE  Contact: [phone]  Please call patient and let her know that I have sent this in for her to her pharmacy.  Thanks  ----- Message -----  From: Destiney Womack MA  Sent: 6/12/2019  10:20 AM  To: JAGDEEP Tobias  Subject: FW: MED CHANGE                                       ----- Message -----  From: Akosua Rubio RegSched Rep  Sent: 6/12/2019  10:16 AM  To: Destiney Womack MA  Subject: MED CHANGE                                       AFTER MONITORING HER MEDICATION PT THINKS THAT TRULICITY WOULD BE A BETTER MED FOR HER LINO SAID SHE WOULD CHANGE IT

## 2019-06-14 ENCOUNTER — TELEPHONE (OUTPATIENT)
Dept: FAMILY MEDICINE CLINIC | Facility: CLINIC | Age: 49
End: 2019-06-14

## 2019-06-14 NOTE — TELEPHONE ENCOUNTER
Pt notified JAGDEEP Leigh sent med into pharmacy. Pt stated she was hoping low dose so it would not drop sugar.

## 2019-06-14 NOTE — TELEPHONE ENCOUNTER
----- Message from Kylee Son Rep sent at 6/14/2019 10:05 AM EDT -----  Regarding: RETURNED CALL  Contact: [phone]  PT RETURNED YOUR CALL

## 2019-06-18 ENCOUNTER — OFFICE VISIT (OUTPATIENT)
Dept: FAMILY MEDICINE CLINIC | Facility: CLINIC | Age: 49
End: 2019-06-18

## 2019-06-18 VITALS
HEART RATE: 75 BPM | DIASTOLIC BLOOD PRESSURE: 90 MMHG | TEMPERATURE: 97.1 F | OXYGEN SATURATION: 99 % | WEIGHT: 204.8 LBS | BODY MASS INDEX: 40.21 KG/M2 | SYSTOLIC BLOOD PRESSURE: 148 MMHG | HEIGHT: 60 IN | RESPIRATION RATE: 20 BRPM

## 2019-06-18 DIAGNOSIS — I10 ESSENTIAL HYPERTENSION: ICD-10-CM

## 2019-06-18 DIAGNOSIS — R51.9 ACUTE NONINTRACTABLE HEADACHE, UNSPECIFIED HEADACHE TYPE: ICD-10-CM

## 2019-06-18 DIAGNOSIS — E11.9 TYPE 2 DIABETES MELLITUS WITHOUT COMPLICATION, WITHOUT LONG-TERM CURRENT USE OF INSULIN (HCC): Primary | ICD-10-CM

## 2019-06-18 PROCEDURE — 99214 OFFICE O/P EST MOD 30 MIN: CPT | Performed by: NURSE PRACTITIONER

## 2019-06-18 PROCEDURE — 96372 THER/PROPH/DIAG INJ SC/IM: CPT | Performed by: NURSE PRACTITIONER

## 2019-06-18 RX ORDER — KETOROLAC TROMETHAMINE 30 MG/ML
60 INJECTION, SOLUTION INTRAMUSCULAR; INTRAVENOUS ONCE
Status: COMPLETED | OUTPATIENT
Start: 2019-06-18 | End: 2019-06-18

## 2019-06-18 RX ORDER — LISINOPRIL AND HYDROCHLOROTHIAZIDE 12.5; 1 MG/1; MG/1
1 TABLET ORAL DAILY
Qty: 30 TABLET | Refills: 2 | Status: SHIPPED | OUTPATIENT
Start: 2019-06-18 | End: 2019-09-10 | Stop reason: SDUPTHER

## 2019-06-18 RX ORDER — CLONIDINE HYDROCHLORIDE 0.1 MG/1
0.2 TABLET ORAL ONCE
Status: COMPLETED | OUTPATIENT
Start: 2019-06-18 | End: 2019-06-18

## 2019-06-18 RX ADMIN — CLONIDINE HYDROCHLORIDE 0.2 MG: 0.1 TABLET ORAL at 15:18

## 2019-06-18 RX ADMIN — KETOROLAC TROMETHAMINE 60 MG: 30 INJECTION, SOLUTION INTRAMUSCULAR; INTRAVENOUS at 15:19

## 2019-06-18 NOTE — PROGRESS NOTES
"Teena Jacobson is a 49 y.o. female who presents for follow up of hypertension and headache.    Chief Complaint   Patient presents with   • Headache   • Hypertension       Patient states that she has had headaches and felt bad for over one week. She checked her BP and found that it was elevated. She has never had the headache associated with the elevated blood pressure before and states that she had to go to the ER Friday night it got so bad. She states that at the ER it was 160/100 when she got there went up to 180/110 and the lowest was in the 140's. They did a CT scan which was normal. She is here to follow up today because her BP is still elevated and she still has a headache.          No Known Allergies    Health Maintenance   Topic Date Due   • ANNUAL PHYSICAL  02/05/1972   • PNEUMOCOCCAL VACCINE (19-64 MEDIUM RISK) (1 of 1 - PPSV23) 02/05/1988   • PAP SMEAR  01/04/2019   • COLONOSCOPY  01/04/2019   • ZOSTER VACCINE (1 of 2) 02/05/2019   • TDAP/TD VACCINES (1 - Tdap) 11/02/2026 (Originally 2/5/1988)   • INFLUENZA VACCINE  08/01/2019   • DIABETIC EYE EXAM  09/03/2019   • DIABETIC FOOT EXAM  10/30/2019   • HEMOGLOBIN A1C  11/08/2019   • URINE MICROALBUMIN  01/04/2020        ROS    Review of Systems   Constitutional: Positive for fatigue.   Musculoskeletal: Positive for myalgias.   Neurological: Positive for headache.   All other systems reviewed and are negative.      Vitals:    06/18/19 1434   BP: 148/90   Pulse: 75   Resp: 20   Temp: 97.1 °F (36.2 °C)   TempSrc: Temporal   SpO2: 99%   Weight: 92.9 kg (204 lb 12.8 oz)   Height: 152.4 cm (60\")   PainSc:   2   PainLoc: Head         Current Outpatient Medications:   •  amLODIPine (NORVASC) 10 MG tablet, Take 1 tablet by mouth Daily., Disp: 30 tablet, Rfl: 2  •  cetirizine (zyrTEC) 10 MG tablet, Take 1 tablet by mouth Daily., Disp: 30 tablet, Rfl: 11  •  desonide (DESOWEN) 0.05 % cream, desonide 0.05 % topical cream  APPLY TO AFFECTED AREA ON EYELIDS AND FACE BID X " 2 WEEKS PRN FLARES, Disp: , Rfl:   •  fluticasone (FLONASE) 50 MCG/ACT nasal spray, 2 sprays into the nostril(s) as directed by provider Daily., Disp: 1 bottle, Rfl: 11  •  hydrocortisone 2.5 % cream, APPLY A THIN LAYER TO THE AFFECTED AREA(S) ON THE FACE TOPICALLY TWICE DAILY FOR 2 WEEKS AS NEEDED FOR FLARES, Disp: , Rfl: 3  •  metFORMIN ER (GLUCOPHAGE-XR) 500 MG 24 hr tablet, , Disp: , Rfl:   •  neomycin-polymyxin-hydrocortisone (CORTISPORIN) 3.5-48068-6 otic solution, Administer 3 drops into the left ear 3 (Three) Times a Day., Disp: 10 mL, Rfl: 0  •  tiZANidine (ZANAFLEX) 4 MG tablet, Take 1 tablet by mouth At Night As Needed for Muscle Spasms., Disp: 30 tablet, Rfl: 5  •  traZODone (DESYREL) 50 MG tablet, Take 1 tablet by mouth Every Night., Disp: 30 tablet, Rfl: 5  •  tretinoin (RETIN-A) 0.01 % gel, tretinoin 0.01 % topical gel  APPLY TO THE AFFECTED AREA(S) ON FACE BY TOPICAL ROUTE ONCE DAILY AT BEDTIME, Disp: , Rfl:   •  ACCU-CHEK FASTCLIX LANCETS misc, USE TO TEST BLOOD GLUCOSE THREE TIMES DAILY, Disp: , Rfl: 11  •  azelastine (ASTELIN) 0.1 % nasal spray, 2 sprays into the nostril(s) as directed by provider 2 (Two) Times a Day. Use in each nostril as directed, Disp: 1 each, Rfl: 12  •  Dulaglutide 0.75 MG/0.5ML solution pen-injector, Inject 0.75 mg under the skin into the appropriate area as directed 1 (One) Time Per Week., Disp: 5 pen, Rfl: 2  •  lisinopril-hydrochlorothiazide (ZESTORETIC) 10-12.5 MG per tablet, Take 1 tablet by mouth Daily., Disp: 30 tablet, Rfl: 2  •  montelukast (SINGULAIR) 10 MG tablet, Take 1 tablet by mouth Every Night., Disp: 30 tablet, Rfl: 2  •  tacrolimus (PROTOPIC) 0.1 % ointment, Apply 1 application topically to the appropriate area as directed 2 (Two) Times a Day., Disp: , Rfl:   No current facility-administered medications for this visit.     PE    Physical Exam   Constitutional: She is oriented to person, place, and time. She appears well-developed and well-nourished.    HENT:   Head: Normocephalic and atraumatic.   Eyes: Conjunctivae and EOM are normal. Pupils are equal, round, and reactive to light.   Cardiovascular: Normal rate, regular rhythm and normal heart sounds. Exam reveals no gallop and no friction rub.   No murmur heard.  Pulmonary/Chest: Effort normal and breath sounds normal. No stridor. No respiratory distress. She has no wheezes. She has no rales.   Neurological: She is alert and oriented to person, place, and time.   Psychiatric: She has a normal mood and affect. Her behavior is normal. Judgment and thought content normal.   Nursing note and vitals reviewed.       A/P    Problem List Items Addressed This Visit        Cardiovascular and Mediastinum    Essential hypertension    Relevant Medications    amLODIPine (NORVASC) 10 MG tablet    lisinopril-hydrochlorothiazide (ZESTORETIC) 10-12.5 MG per tablet    Dulaglutide 0.75 MG/0.5ML solution pen-injector    CloNIDine (CATAPRES) tablet 0.2 mg (Completed)       Endocrine    Type 2 diabetes mellitus without complication, without long-term current use of insulin (CMS/[facility]) - Primary    Relevant Medications    metFORMIN ER (GLUCOPHAGE-XR) 500 MG 24 hr tablet    Dulaglutide 0.75 MG/0.5ML solution pen-injector      Other Visit Diagnoses     Acute nonintractable headache, unspecified headache type        Relevant Medications    ketorolac (TORADOL) injection 60 mg (Completed)          Plan of care reviewed with patient at the conclusion of today's visit. Education was provided regarding diagnosis, management and any prescribed or recommended OTC medications.  Patient verbalizes understanding of and agreement with management plan.    Return in about 4 weeks (around 7/16/2019) for Recheck.     JAGDEEP Kan

## 2019-07-12 DIAGNOSIS — I10 ESSENTIAL HYPERTENSION: ICD-10-CM

## 2019-07-12 RX ORDER — AMLODIPINE BESYLATE 10 MG/1
TABLET ORAL
Qty: 30 TABLET | Refills: 1 | Status: SHIPPED | OUTPATIENT
Start: 2019-07-12 | End: 2019-09-10 | Stop reason: SDUPTHER

## 2019-09-10 DIAGNOSIS — I10 ESSENTIAL HYPERTENSION: ICD-10-CM

## 2019-09-11 RX ORDER — AMLODIPINE BESYLATE 10 MG/1
TABLET ORAL
Qty: 30 TABLET | Refills: 0 | Status: SHIPPED | OUTPATIENT
Start: 2019-09-11 | End: 2019-10-03 | Stop reason: SDUPTHER

## 2019-09-11 RX ORDER — LISINOPRIL AND HYDROCHLOROTHIAZIDE 12.5; 1 MG/1; MG/1
TABLET ORAL
Qty: 30 TABLET | Refills: 1 | Status: SHIPPED | OUTPATIENT
Start: 2019-09-11 | End: 2019-10-03 | Stop reason: SDUPTHER

## 2019-10-03 ENCOUNTER — OFFICE VISIT (OUTPATIENT)
Dept: FAMILY MEDICINE CLINIC | Facility: CLINIC | Age: 49
End: 2019-10-03

## 2019-10-03 VITALS
OXYGEN SATURATION: 96 % | HEART RATE: 82 BPM | WEIGHT: 199 LBS | BODY MASS INDEX: 39.07 KG/M2 | DIASTOLIC BLOOD PRESSURE: 70 MMHG | RESPIRATION RATE: 20 BRPM | SYSTOLIC BLOOD PRESSURE: 128 MMHG | HEIGHT: 60 IN | TEMPERATURE: 97 F

## 2019-10-03 DIAGNOSIS — E11.9 TYPE 2 DIABETES MELLITUS WITHOUT COMPLICATION, WITHOUT LONG-TERM CURRENT USE OF INSULIN (HCC): Primary | ICD-10-CM

## 2019-10-03 DIAGNOSIS — I10 ESSENTIAL HYPERTENSION: ICD-10-CM

## 2019-10-03 DIAGNOSIS — M62.838 MUSCLE SPASM: ICD-10-CM

## 2019-10-03 DIAGNOSIS — G47.00 INSOMNIA, UNSPECIFIED TYPE: ICD-10-CM

## 2019-10-03 DIAGNOSIS — J30.9 ALLERGIC RHINITIS, UNSPECIFIED SEASONALITY, UNSPECIFIED TRIGGER: ICD-10-CM

## 2019-10-03 DIAGNOSIS — E66.9 OBESITY (BMI 30-39.9): ICD-10-CM

## 2019-10-03 PROBLEM — Z85.3 HISTORY OF MALIGNANT NEOPLASM OF BREAST: Status: ACTIVE | Noted: 2019-10-03

## 2019-10-03 LAB
HBA1C MFR BLD: 6.1 %
Lab: NORMAL

## 2019-10-03 PROCEDURE — 99214 OFFICE O/P EST MOD 30 MIN: CPT | Performed by: NURSE PRACTITIONER

## 2019-10-03 PROCEDURE — 83036 HEMOGLOBIN GLYCOSYLATED A1C: CPT | Performed by: NURSE PRACTITIONER

## 2019-10-03 RX ORDER — LISINOPRIL AND HYDROCHLOROTHIAZIDE 12.5; 1 MG/1; MG/1
1 TABLET ORAL DAILY
Qty: 30 TABLET | Refills: 2 | Status: SHIPPED | OUTPATIENT
Start: 2019-10-03 | End: 2020-02-10 | Stop reason: SDUPTHER

## 2019-10-03 RX ORDER — MONTELUKAST SODIUM 10 MG/1
10 TABLET ORAL NIGHTLY
Qty: 30 TABLET | Refills: 11 | Status: SHIPPED | OUTPATIENT
Start: 2019-10-03 | End: 2020-09-01 | Stop reason: SDUPTHER

## 2019-10-03 RX ORDER — AMLODIPINE BESYLATE 5 MG/1
10 TABLET ORAL DAILY
Qty: 30 TABLET | Refills: 2 | Status: SHIPPED | OUTPATIENT
Start: 2019-10-03 | End: 2020-02-05 | Stop reason: SDUPTHER

## 2019-10-03 RX ORDER — TIZANIDINE 4 MG/1
4 TABLET ORAL NIGHTLY PRN
Qty: 30 TABLET | Refills: 5 | Status: SHIPPED | OUTPATIENT
Start: 2019-10-03 | End: 2020-08-18 | Stop reason: SDUPTHER

## 2019-10-03 RX ORDER — METFORMIN HYDROCHLORIDE 500 MG/1
500 TABLET, EXTENDED RELEASE ORAL
Qty: 30 TABLET | Refills: 5 | Status: SHIPPED | OUTPATIENT
Start: 2019-10-03 | End: 2020-02-05 | Stop reason: ALTCHOICE

## 2019-10-03 RX ORDER — TRAZODONE HYDROCHLORIDE 50 MG/1
50 TABLET ORAL NIGHTLY
Qty: 30 TABLET | Refills: 5 | Status: SHIPPED | OUTPATIENT
Start: 2019-10-03 | End: 2020-04-14 | Stop reason: SDUPTHER

## 2019-10-03 NOTE — PROGRESS NOTES
"Teena Jacobson is a 49 y.o. female who presents for follow up of hypertension and diabetes.    Chief Complaint   Patient presents with   • Hypertension   • Diabetes       Patient states that she is feeling well today. She has lost 13 pounds since she started seeing the dietician and trying to lose weight. Her blood pressure has gone down as well. Her allergies have not been too bad but she occasionally will have a bad day. She does need refills on some medications today. She has no new complaints.          No Known Allergies    Health Maintenance   Topic Date Due   • MAMMOGRAM  1969   • ANNUAL PHYSICAL  02/05/1972   • PNEUMOCOCCAL VACCINE (19-64 MEDIUM RISK) (1 of 1 - PPSV23) 02/05/1988   • PAP SMEAR  01/04/2019   • COLONOSCOPY  01/04/2019   • ZOSTER VACCINE (1 of 2) 02/05/2019   • INFLUENZA VACCINE  08/01/2019   • DIABETIC EYE EXAM  09/03/2019   • TDAP/TD VACCINES (1 - Tdap) 11/02/2026 (Originally 2/5/1988)   • DIABETIC FOOT EXAM  10/30/2019   • HEMOGLOBIN A1C  11/08/2019   • URINE MICROALBUMIN  01/04/2020        ROS    Review of Systems   HENT: Positive for rhinorrhea.    Allergic/Immunologic: Positive for environmental allergies.   All other systems reviewed and are negative.      Vitals:    10/03/19 1401   BP: 128/70   Pulse: 82   Resp: 20   Temp: 97 °F (36.1 °C)   TempSrc: Temporal   SpO2: 96%   Weight: 90.3 kg (199 lb)   Height: 152.4 cm (60\")   PainSc: 0-No pain         Current Outpatient Medications:   •  ACCU-CHEK FASTCLIX LANCETS misc, USE TO TEST BLOOD GLUCOSE THREE TIMES DAILY, Disp: , Rfl: 11  •  amLODIPine (NORVASC) 5 MG tablet, Take 2 tablets by mouth Daily. 200001, Disp: 30 tablet, Rfl: 2  •  azelastine (ASTELIN) 0.1 % nasal spray, 2 sprays into the nostril(s) as directed by provider 2 (Two) Times a Day. Use in each nostril as directed, Disp: 1 each, Rfl: 12  •  cetirizine (zyrTEC) 10 MG tablet, Take 1 tablet by mouth Daily., Disp: 30 tablet, Rfl: 11  •  desonide (DESOWEN) 0.05 % cream, " desonide 0.05 % topical cream  APPLY TO AFFECTED AREA ON EYELIDS AND FACE BID X 2 WEEKS PRN FLARES, Disp: , Rfl:   •  Dulaglutide 0.75 MG/0.5ML solution pen-injector, Inject 0.75 mg under the skin into the appropriate area as directed 1 (One) Time Per Week., Disp: 5 pen, Rfl: 2  •  fluticasone (FLONASE) 50 MCG/ACT nasal spray, 2 sprays into the nostril(s) as directed by provider Daily., Disp: 1 bottle, Rfl: 11  •  lisinopril-hydrochlorothiazide (PRINZIDE,ZESTORETIC) 10-12.5 MG per tablet, Take 1 tablet by mouth Daily. 200001, Disp: 30 tablet, Rfl: 2  •  metFORMIN ER (GLUCOPHAGE-XR) 500 MG 24 hr tablet, Take 1 tablet by mouth Daily With Breakfast., Disp: 30 tablet, Rfl: 5  •  montelukast (SINGULAIR) 10 MG tablet, Take 1 tablet by mouth Every Night., Disp: 30 tablet, Rfl: 11  •  neomycin-polymyxin-hydrocortisone (CORTISPORIN) 3.5-90092-3 otic solution, Administer 3 drops into the left ear 3 (Three) Times a Day., Disp: 10 mL, Rfl: 0  •  tacrolimus (PROTOPIC) 0.1 % ointment, Apply 1 application topically to the appropriate area as directed 2 (Two) Times a Day., Disp: , Rfl:   •  tiZANidine (ZANAFLEX) 4 MG tablet, Take 1 tablet by mouth At Night As Needed for Muscle Spasms., Disp: 30 tablet, Rfl: 5  •  traZODone (DESYREL) 50 MG tablet, Take 1 tablet by mouth Every Night., Disp: 30 tablet, Rfl: 5  •  tretinoin (RETIN-A) 0.01 % gel, tretinoin 0.01 % topical gel  APPLY TO THE AFFECTED AREA(S) ON FACE BY TOPICAL ROUTE ONCE DAILY AT BEDTIME, Disp: , Rfl:   •  hydrocortisone 2.5 % cream, APPLY A THIN LAYER TO THE AFFECTED AREA(S) ON THE FACE TOPICALLY TWICE DAILY FOR 2 WEEKS AS NEEDED FOR FLARES, Disp: , Rfl: 3    PE    Physical Exam   Constitutional: She is oriented to person, place, and time. She appears well-developed. She is obese.  HENT:   Head: Normocephalic and atraumatic.   Cardiovascular: Normal rate, regular rhythm, normal heart sounds and intact distal pulses. Exam reveals no gallop and no friction rub.   No murmur  heard.  Pulmonary/Chest: Effort normal and breath sounds normal. No stridor. No respiratory distress. She has no wheezes. She has no rales.   Neurological: She is alert and oriented to person, place, and time.   Skin: Skin is warm and dry.   Psychiatric: She has a normal mood and affect. Her behavior is normal. Judgment and thought content normal.   Nursing note and vitals reviewed.       A/P    Problem List Items Addressed This Visit        Cardiovascular and Mediastinum    Essential hypertension    Relevant Medications    Dulaglutide 0.75 MG/0.5ML solution pen-injector    CloNIDine (CATAPRES) tablet 0.2 mg (Completed)    amLODIPine (NORVASC) 5 MG tablet    lisinopril-hydrochlorothiazide (PRINZIDE,ZESTORETIC) 10-12.5 MG per tablet       Respiratory    Allergic rhinitis    Relevant Medications    fluticasone (FLONASE) 50 MCG/ACT nasal spray    cetirizine (zyrTEC) 10 MG tablet    azelastine (ASTELIN) 0.1 % nasal spray    montelukast (SINGULAIR) 10 MG tablet       Digestive    Obesity (BMI 30-39.9)       Endocrine    Type 2 diabetes mellitus without complication, without long-term current use of insulin (CMS/[facility]) - Primary    Relevant Medications    Dulaglutide 0.75 MG/0.5ML solution pen-injector    metFORMIN ER (GLUCOPHAGE-XR) 500 MG 24 hr tablet    Other Relevant Orders    POC Glycosylated Hemoglobin (Hb A1C) (Completed)      Other Visit Diagnoses     Insomnia, unspecified type        Relevant Medications    traZODone (DESYREL) 50 MG tablet    Muscle spasm        Relevant Medications    tiZANidine (ZANAFLEX) 4 MG tablet        Hemoglobin A1c is 6.1. She saw the nutritionist today and is doing well with her diet. We discussed additional benefits of weight loss including improved diabetes and blood pressure control.      Plan of care reviewed with patient at the conclusion of today's visit. Education was provided regarding diagnosis, management and any prescribed or recommended OTC medications.  Patient verbalizes  understanding of and agreement with management plan.    Return in about 3 months (around 1/3/2020) for Annual.     JAGDEEP Kan

## 2020-01-05 DIAGNOSIS — J30.9 ALLERGIC RHINITIS, UNSPECIFIED SEASONALITY, UNSPECIFIED TRIGGER: ICD-10-CM

## 2020-01-05 RX ORDER — FLUTICASONE PROPIONATE 50 MCG
SPRAY, SUSPENSION (ML) NASAL
Qty: 16 G | Refills: 10 | Status: SHIPPED | OUTPATIENT
Start: 2020-01-05 | End: 2020-09-01 | Stop reason: SDUPTHER

## 2020-01-06 DIAGNOSIS — I10 ESSENTIAL HYPERTENSION: ICD-10-CM

## 2020-01-07 RX ORDER — AMLODIPINE BESYLATE 10 MG/1
TABLET ORAL
Qty: 30 TABLET | Refills: 5 | Status: SHIPPED | OUTPATIENT
Start: 2020-01-07 | End: 2020-06-29 | Stop reason: SDUPTHER

## 2020-02-05 ENCOUNTER — OFFICE VISIT (OUTPATIENT)
Dept: FAMILY MEDICINE CLINIC | Facility: CLINIC | Age: 50
End: 2020-02-05

## 2020-02-05 VITALS
OXYGEN SATURATION: 97 % | SYSTOLIC BLOOD PRESSURE: 128 MMHG | WEIGHT: 204 LBS | HEIGHT: 60 IN | DIASTOLIC BLOOD PRESSURE: 82 MMHG | HEART RATE: 93 BPM | TEMPERATURE: 97.3 F | BODY MASS INDEX: 40.05 KG/M2 | RESPIRATION RATE: 22 BRPM

## 2020-02-05 DIAGNOSIS — E11.9 TYPE 2 DIABETES MELLITUS WITHOUT COMPLICATION, WITHOUT LONG-TERM CURRENT USE OF INSULIN (HCC): Primary | ICD-10-CM

## 2020-02-05 DIAGNOSIS — Z00.00 ANNUAL PHYSICAL EXAM: ICD-10-CM

## 2020-02-05 DIAGNOSIS — Z13.220 LIPID SCREENING: ICD-10-CM

## 2020-02-05 DIAGNOSIS — Z12.11 COLON CANCER SCREENING: ICD-10-CM

## 2020-02-05 DIAGNOSIS — J30.9 ALLERGIC RHINITIS, UNSPECIFIED SEASONALITY, UNSPECIFIED TRIGGER: ICD-10-CM

## 2020-02-05 DIAGNOSIS — R94.31 ABNORMAL EKG: ICD-10-CM

## 2020-02-05 LAB
BILIRUB BLD-MCNC: NEGATIVE MG/DL
CLARITY, POC: CLEAR
COLOR UR: YELLOW
GLUCOSE UR STRIP-MCNC: NEGATIVE MG/DL
HBA1C MFR BLD: 6.3 %
KETONES UR QL: NEGATIVE
LEUKOCYTE EST, POC: NEGATIVE
NITRITE UR-MCNC: NEGATIVE MG/ML
PH UR: 6.5 [PH] (ref 5–8)
POC CREATININE URINE: NORMAL
POC MICROALBUMIN URINE: NORMAL
PROT UR STRIP-MCNC: NEGATIVE MG/DL
RBC # UR STRIP: NEGATIVE /UL
SP GR UR: 1.02 (ref 1–1.03)
UROBILINOGEN UR QL: NORMAL

## 2020-02-05 PROCEDURE — 36415 COLL VENOUS BLD VENIPUNCTURE: CPT | Performed by: NURSE PRACTITIONER

## 2020-02-05 PROCEDURE — 93000 ELECTROCARDIOGRAM COMPLETE: CPT | Performed by: NURSE PRACTITIONER

## 2020-02-05 PROCEDURE — 83036 HEMOGLOBIN GLYCOSYLATED A1C: CPT | Performed by: NURSE PRACTITIONER

## 2020-02-05 PROCEDURE — 99396 PREV VISIT EST AGE 40-64: CPT | Performed by: NURSE PRACTITIONER

## 2020-02-05 PROCEDURE — 81003 URINALYSIS AUTO W/O SCOPE: CPT | Performed by: NURSE PRACTITIONER

## 2020-02-05 PROCEDURE — 82044 UR ALBUMIN SEMIQUANTITATIVE: CPT | Performed by: NURSE PRACTITIONER

## 2020-02-05 RX ORDER — DOXYCYCLINE HYCLATE 100 MG/1
100 CAPSULE ORAL DAILY
COMMUNITY
End: 2020-02-05

## 2020-02-05 RX ORDER — CEFADROXIL 500 MG/1
CAPSULE ORAL
COMMUNITY
End: 2020-02-05

## 2020-02-05 RX ORDER — CLINDAMYCIN PHOSPHATE 11.9 MG/ML
SOLUTION TOPICAL
COMMUNITY
End: 2021-06-18

## 2020-02-05 RX ORDER — CETIRIZINE HYDROCHLORIDE 10 MG/1
TABLET ORAL
Qty: 30 TABLET | Refills: 0 | Status: SHIPPED | OUTPATIENT
Start: 2020-02-05 | End: 2020-09-01 | Stop reason: SDUPTHER

## 2020-02-05 NOTE — PROGRESS NOTES
"Teena Jacobson is a 50 y.o. female who presents for annual physical examination.     Annual Exam (Pt has no concerns today and is not fasting for lab studies. )      HPI     Past Medical History:   Diagnosis Date   • Cancer (CMS/HCC)     breast, chemo, radiation done   • Diabetes mellitus (CMS/HCC)    • H/O mastectomy    • Hypertension        Past Surgical History:   Procedure Laterality Date   • MASTECTOMY PARTIAL / LUMPECTOMY Left 2009       History reviewed. No pertinent family history.    Social History     Socioeconomic History   • Marital status:      Spouse name: Not on file   • Number of children: Not on file   • Years of education: Not on file   • Highest education level: Not on file   Tobacco Use   • Smoking status: Never Smoker   • Smokeless tobacco: Never Used   Substance and Sexual Activity   • Alcohol use: No   • Drug use: No   • Sexual activity: Defer       No Known Allergies      Health Maintenance   Topic Date Due   • PAP SMEAR  01/04/2019   • COLONOSCOPY  01/04/2019   • ZOSTER VACCINE (1 of 2) 02/05/2019   • DIABETIC EYE EXAM  09/03/2019   • PNEUMOCOCCAL VACCINE (19-64 MEDIUM RISK) (1 of 1 - PPSV23) 02/08/2021 (Originally 2/5/1989)   • HEMOGLOBIN A1C  08/05/2020   • DIABETIC FOOT EXAM  02/05/2021   • URINE MICROALBUMIN  02/05/2021   • ANNUAL PHYSICAL  02/06/2021   • MAMMOGRAM  08/05/2021   • TDAP/TD VACCINES (2 - Td) 12/05/2027   • INFLUENZA VACCINE  Completed        ROS    Review of Systems   Musculoskeletal: Positive for arthralgias.       Visit Vitals  /82   Pulse 93   Temp 97.3 °F (36.3 °C)   Resp 22   Ht 152.4 cm (60\")   Wt 92.5 kg (204 lb)   LMP  (LMP Unknown)   SpO2 97%   BMI 39.84 kg/m²         Current Outpatient Medications:   •  ACCU-CHEK FASTCLIX LANCETS misc, USE TO TEST BLOOD GLUCOSE THREE TIMES DAILY, Disp: , Rfl: 11  •  amLODIPine (NORVASC) 10 MG tablet, TAKE 1 TABLET BY MOUTH ONE TIME A DAY , Disp: 30 tablet, Rfl: 5  •  azelastine (ASTELIN) 0.1 % nasal spray, 2 sprays " into the nostril(s) as directed by provider 2 (Two) Times a Day. Use in each nostril as directed, Disp: 1 each, Rfl: 12  •  clindamycin (CLEOCIN T) 1 % external solution, clindamycin phosphate 1 % topical solution  APPLY A THIN LAYER TO THE AFFECTED AREA(S) BY TOPICAL ROUTE 2 TIMES PER DAY, Disp: , Rfl:   •  desonide (DESOWEN) 0.05 % cream, desonide 0.05 % topical cream  APPLY TO AFFECTED AREA ON EYELIDS AND FACE BID X 2 WEEKS PRN FLARES, Disp: , Rfl:   •  fluticasone (FLONASE) 50 MCG/ACT nasal spray, INHALE 2 SPRAYS IN EACH NOSTRIL ONE TIME A DAY , Disp: 16 g, Rfl: 10  •  hydrocortisone 2.5 % cream, APPLY A THIN LAYER TO THE AFFECTED AREA(S) ON THE FACE TOPICALLY TWICE DAILY FOR 2 WEEKS AS NEEDED FOR FLARES, Disp: , Rfl: 3  •  lisinopril-hydrochlorothiazide (PRINZIDE,ZESTORETIC) 10-12.5 MG per tablet, Take 1 tablet by mouth Daily. 200001, Disp: 30 tablet, Rfl: 2  •  montelukast (SINGULAIR) 10 MG tablet, Take 1 tablet by mouth Every Night., Disp: 30 tablet, Rfl: 11  •  tacrolimus (PROTOPIC) 0.1 % ointment, Apply 1 application topically to the appropriate area as directed 2 (Two) Times a Day., Disp: , Rfl:   •  tiZANidine (ZANAFLEX) 4 MG tablet, Take 1 tablet by mouth At Night As Needed for Muscle Spasms., Disp: 30 tablet, Rfl: 5  •  traZODone (DESYREL) 50 MG tablet, Take 1 tablet by mouth Every Night., Disp: 30 tablet, Rfl: 5  •  tretinoin (RETIN-A) 0.01 % gel, tretinoin 0.01 % topical gel  APPLY TO THE AFFECTED AREA(S) ON FACE BY TOPICAL ROUTE ONCE DAILY AT BEDTIME, Disp: , Rfl:   •  cetirizine (zyrTEC) 10 MG tablet, TAKE 1 TABLET BY MOUTH ONE TIME A DAY , Disp: 30 tablet, Rfl: 0  •  Empagliflozin (JARDIANCE) 10 MG tablet, Take 10 mg by mouth Daily., Disp: 30 tablet, Rfl: 5  •  metFORMIN (GLUCOPHAGE) 500 MG tablet, Take 2 tablets by mouth 2 (Two) Times a Day With Meals., Disp: 120 tablet, Rfl: 5       PE    Physical Exam   Constitutional: She is oriented to person, place, and time. She appears well-developed and  well-nourished.   HENT:   Head: Normocephalic and atraumatic.   Right Ear: External ear normal.   Left Ear: External ear normal.   Nose: Nose normal.   Mouth/Throat: Oropharynx is clear and moist.   Eyes: Pupils are equal, round, and reactive to light. Conjunctivae and EOM are normal.   Neck: Normal range of motion. Neck supple. No JVD present. No tracheal deviation present. No thyromegaly present.   Cardiovascular: Normal rate, regular rhythm, normal heart sounds and intact distal pulses. Exam reveals no gallop and no friction rub.   No murmur heard.  Pulmonary/Chest: Effort normal and breath sounds normal. No stridor. No respiratory distress. She has no wheezes. She has no rales.   Abdominal: Soft. Bowel sounds are normal. She exhibits no distension and no mass. There is no tenderness. There is no rebound and no guarding. No hernia.   Musculoskeletal:        Lumbar back: She exhibits pain and spasm.   Lymphadenopathy:     She has no cervical adenopathy.   Neurological: She is alert and oriented to person, place, and time.   Skin: Skin is warm and dry. Capillary refill takes less than 2 seconds.   Psychiatric: She has a normal mood and affect. Her behavior is normal. Judgment and thought content normal.   Nursing note and vitals reviewed.      ECG 12 Lead  Date/Time: 2/5/2020 2:16 PM  Performed by: Marielos Borrero APRN  Authorized by: Marielos Borrero APRN   Comparison: not compared with previous ECG   Previous ECG: no previous ECG available  Rhythm: sinus rhythm  Rate: normal  BPM: 75  Conduction: conduction normal  ST Segments: ST segments normal  T inversion: III and aVF  T flattening: V4, V5, V6 and V3  QRS axis: normal  Other findings: T wave abnormality and left atrial abnormality    Clinical impression: non-specific ECG          A/P    Problem List Items Addressed This Visit        Endocrine    Type 2 diabetes mellitus without complication, without long-term current use of insulin (CMS/[facility]) - Primary     Relevant Medications    Empagliflozin (JARDIANCE) 10 MG tablet    metFORMIN (GLUCOPHAGE) 500 MG tablet    Other Relevant Orders    POCT microalbumin (Completed)    POC Glycosylated Hemoglobin (Hb A1C) (Completed)    Hemoglobin A1c      Other Visit Diagnoses     Colon cancer screening        Relevant Orders    Ambulatory Referral For Screening Colonoscopy    Annual physical exam        Relevant Orders    ECG 12 Lead    CBC Auto Differential    Comprehensive Metabolic Panel    TSH    Hemoglobin A1c    Lipid Panel With / Chol / HDL Ratio    Vitamin D 1,25 Dihydroxy    Vitamin B12    POC Urinalysis Dipstick, Automated (Completed)    Lipid screening        Relevant Orders    Lipid Panel With / Chol / HDL Ratio    Abnormal EKG        Relevant Orders    Ambulatory Referral to [facility] [place] [facility]      Hemoglobin A1c is 6.3 today. Will contact patient with lab results.    Counseling was given to patient for the following topics: appropriate exercise, healthy eating habits, disease prevention and preventative screenings, importance of immunizations, including risks and benefits and bone health    Plan of care reviewed with the patient at the conclusion of today's visit.  Education was provided regarding diagnosis, management, and any prescribed or recommended OTC medications.  Patient verbalized understanding of and agreement with management plan.     Patient will return in 6 months for recheck.      JAGDEEP Kan

## 2020-02-05 NOTE — PATIENT INSTRUCTIONS
Diabetes Mellitus and Foot Care  Foot care is an important part of your health, especially when you have diabetes. Diabetes may cause you to have problems because of poor blood flow (circulation) to your feet and legs, which can cause your skin to:  · Become thinner and drier.  · Break more easily.  · Heal more slowly.  · Peel and crack.  You may also have nerve damage (neuropathy) in your legs and feet, causing decreased feeling in them. This means that you may not notice minor injuries to your feet that could lead to more serious problems. Noticing and addressing any potential problems early is the best way to prevent future foot problems.  How to care for your feet  Foot hygiene  · Wash your feet daily with warm water and mild soap. Do not use hot water. Then, pat your feet and the areas between your toes until they are completely dry. Do not soak your feet as this can dry your skin.  · Trim your toenails straight across. Do not dig under them or around the cuticle. File the edges of your nails with an emery board or nail file.  · Apply a moisturizing lotion or petroleum jelly to the skin on your feet and to dry, brittle toenails. Use lotion that does not contain alcohol and is unscented. Do not apply lotion between your toes.  Shoes and socks  · Wear clean socks or stockings every day. Make sure they are not too tight. Do not wear knee-high stockings since they may decrease blood flow to your legs.  · Wear shoes that fit properly and have enough cushioning. Always look in your shoes before you put them on to be sure there are no objects inside.  · To break in new shoes, wear them for just a few hours a day. This prevents injuries on your feet.  Wounds, scrapes, corns, and calluses  · Check your feet daily for blisters, cuts, bruises, sores, and redness. If you cannot see the bottom of your feet, use a mirror or ask someone for help.  · Do not cut corns or calluses or try to remove them with medicine.  · If you  find a minor scrape, cut, or break in the skin on your feet, keep it and the skin around it clean and dry. You may clean these areas with mild soap and water. Do not clean the area with peroxide, alcohol, or iodine.  · If you have a wound, scrape, corn, or callus on your foot, look at it several times a day to make sure it is healing and not infected. Check for:  ? Redness, swelling, or pain.  ? Fluid or blood.  ? Warmth.  ? Pus or a bad smell.  General instructions  · Do not cross your legs. This may decrease blood flow to your feet.  · Do not use heating pads or hot water bottles on your feet. They may burn your skin. If you have lost feeling in your feet or legs, you may not know this is happening until it is too late.  · Protect your feet from hot and cold by wearing shoes, such as at the beach or on hot pavement.  · Schedule a complete foot exam at least once a year (annually) or more often if you have foot problems. If you have foot problems, report any cuts, sores, or bruises to your health care provider immediately.  Contact a health care provider if:  · You have a medical condition that increases your risk of infection and you have any cuts, sores, or bruises on your feet.  · You have an injury that is not healing.  · You have redness on your legs or feet.  · You feel burning or tingling in your legs or feet.  · You have pain or cramps in your legs and feet.  · Your legs or feet are numb.  · Your feet always feel cold.  · You have pain around a toenail.  Get help right away if:  · You have a wound, scrape, corn, or callus on your foot and:  ? You have pain, swelling, or redness that gets worse.  ? You have fluid or blood coming from the wound, scrape, corn, or callus.  ? Your wound, scrape, corn, or callus feels warm to the touch.  ? You have pus or a bad smell coming from the wound, scrape, corn, or callus.  ? You have a fever.  ? You have a red line going up your leg.  Summary  · Check your feet every day  for cuts, sores, red spots, swelling, and blisters.  · Moisturize feet and legs daily.  · Wear shoes that fit properly and have enough cushioning.  · If you have foot problems, report any cuts, sores, or bruises to your health care provider immediately.  · Schedule a complete foot exam at least once a year (annually) or more often if you have foot problems.  This information is not intended to replace advice given to you by your health care provider. Make sure you discuss any questions you have with your health care provider.  Document Released: 12/15/2001 Document Revised: 01/30/2019 Document Reviewed: 01/19/2018  [employer] Interactive Patient Education © 2019 [employer]

## 2020-02-06 ENCOUNTER — TELEPHONE (OUTPATIENT)
Dept: FAMILY MEDICINE CLINIC | Facility: CLINIC | Age: 50
End: 2020-02-06

## 2020-02-06 NOTE — TELEPHONE ENCOUNTER
Charline - Please call her and tell her that she is not scheduled with a cardiologist. They [facility]. They are just doing a work up. It will be fine.  Thanks

## 2020-02-06 NOTE — TELEPHONE ENCOUNTER
Patient called in and stated during her visit with Gissell yesterday she was told she would need to have a stress test done. The patient received a call today to schedule an appointment for a consultation with a Cardiologist.    She was under the impression that she was going to schedule a stress test because the EKG was abnormal.    Patient would like a call back from the office to clarify this.      Patient call back [phone]

## 2020-02-07 LAB
1,25(OH)2D3 SERPL-MCNC: 58 PG/ML (ref 19.9–79.3)
ALBUMIN SERPL-MCNC: 4.8 G/DL (ref 3.5–5.2)
ALBUMIN/GLOB SERPL: 1.7 G/DL
ALP SERPL-CCNC: 93 U/L (ref 39–117)
ALT SERPL-CCNC: 18 U/L (ref 1–33)
AST SERPL-CCNC: 16 U/L (ref 1–32)
BASOPHILS # BLD AUTO: 0.05 10*3/MM3 (ref 0–0.2)
BASOPHILS NFR BLD AUTO: 0.6 % (ref 0–1.5)
BILIRUB SERPL-MCNC: 0.3 MG/DL (ref 0.2–1.2)
BUN SERPL-MCNC: 14 MG/DL (ref 6–20)
BUN/CREAT SERPL: 15.2 (ref 7–25)
CALCIUM SERPL-MCNC: 10.1 MG/DL (ref 8.6–10.5)
CHLORIDE SERPL-SCNC: 98 MMOL/L (ref 98–107)
CHOLEST SERPL-MCNC: 174 MG/DL (ref 0–200)
CHOLEST/HDLC SERPL: 3.48 {RATIO}
CO2 SERPL-SCNC: 27.3 MMOL/L (ref 22–29)
CREAT SERPL-MCNC: 0.92 MG/DL (ref 0.57–1)
EOSINOPHIL # BLD AUTO: 0.06 10*3/MM3 (ref 0–0.4)
EOSINOPHIL NFR BLD AUTO: 0.7 % (ref 0.3–6.2)
ERYTHROCYTE [DISTWIDTH] IN BLOOD BY AUTOMATED COUNT: 13.9 % (ref 12.3–15.4)
GLOBULIN SER CALC-MCNC: 2.9 GM/DL
GLUCOSE SERPL-MCNC: 107 MG/DL (ref 65–99)
HBA1C MFR BLD: 7.3 % (ref 4.8–5.6)
HCT VFR BLD AUTO: 40.7 % (ref 34–46.6)
HDLC SERPL-MCNC: 50 MG/DL (ref 40–60)
HGB BLD-MCNC: 13.7 G/DL (ref 12–15.9)
IMM GRANULOCYTES # BLD AUTO: 0.03 10*3/MM3 (ref 0–0.05)
IMM GRANULOCYTES NFR BLD AUTO: 0.4 % (ref 0–0.5)
LDLC SERPL CALC-MCNC: 67 MG/DL (ref 0–100)
LYMPHOCYTES # BLD AUTO: 3.4 10*3/MM3 (ref 0.7–3.1)
LYMPHOCYTES NFR BLD AUTO: 41.5 % (ref 19.6–45.3)
MCH RBC QN AUTO: 28.1 PG (ref 26.6–33)
MCHC RBC AUTO-ENTMCNC: 33.7 G/DL (ref 31.5–35.7)
MCV RBC AUTO: 83.4 FL (ref 79–97)
MONOCYTES # BLD AUTO: 0.59 10*3/MM3 (ref 0.1–0.9)
MONOCYTES NFR BLD AUTO: 7.2 % (ref 5–12)
NEUTROPHILS # BLD AUTO: 4.07 10*3/MM3 (ref 1.7–7)
NEUTROPHILS NFR BLD AUTO: 49.6 % (ref 42.7–76)
NRBC BLD AUTO-RTO: 0 /100 WBC (ref 0–0.2)
PLATELET # BLD AUTO: 316 10*3/MM3 (ref 140–450)
POTASSIUM SERPL-SCNC: 5.6 MMOL/L (ref 3.5–5.2)
PROT SERPL-MCNC: 7.7 G/DL (ref 6–8.5)
RBC # BLD AUTO: 4.88 10*6/MM3 (ref 3.77–5.28)
SODIUM SERPL-SCNC: 138 MMOL/L (ref 136–145)
TRIGL SERPL-MCNC: 286 MG/DL (ref 0–150)
TSH SERPL DL<=0.005 MIU/L-ACNC: 2.54 UIU/ML (ref 0.27–4.2)
VIT B12 SERPL-MCNC: 308 PG/ML (ref 211–946)
VLDLC SERPL CALC-MCNC: 57.2 MG/DL
WBC # BLD AUTO: 8.2 10*3/MM3 (ref 3.4–10.8)

## 2020-02-10 DIAGNOSIS — I10 ESSENTIAL HYPERTENSION: ICD-10-CM

## 2020-02-11 ENCOUNTER — TELEPHONE (OUTPATIENT)
Dept: FAMILY MEDICINE CLINIC | Facility: CLINIC | Age: 50
End: 2020-02-11

## 2020-02-11 RX ORDER — LISINOPRIL AND HYDROCHLOROTHIAZIDE 12.5; 1 MG/1; MG/1
1 TABLET ORAL DAILY
Qty: 30 TABLET | Refills: 2 | Status: SHIPPED | OUTPATIENT
Start: 2020-02-11 | End: 2020-05-12 | Stop reason: SDUPTHER

## 2020-02-11 NOTE — TELEPHONE ENCOUNTER
Lisinopril - hydrochloothyazide 10-12.5 requested and sent to [facility] pharmacy     Last appt 2/5/2020

## 2020-02-11 NOTE — TELEPHONE ENCOUNTER
Pt called and said that Dr. Borrero gave her a coupon for her jaurdiance but it was  and she wanted to see if there was another one she could get that wasn't  to help with her copay? Callback at [phone]

## 2020-02-11 NOTE — TELEPHONE ENCOUNTER
Pt advised that the  card is still good. Pt stated that she called the number on the back of the card and they told her the same this.

## 2020-02-12 NOTE — PROGRESS NOTES
[facility]  [facility]      02/13/2020       Teena Jacobson  [address] [place] [address]  [unfilled]    1970    Marielos Borrero APRN    Teena Jacobson is a 50 y.o. female.      Subjective:     Chief Complaint:  Establish Care       HPI   50-year-old female with past medical history significant for hypertension, type 2 diabetes, breast cancer who presents to the heart and [facility] Center for evaluation of abnormal EKG at the request of Marielos GANNON.Patient had annual exam on 2/5/2020 and had an EKG showing normal sinus rhythm, possible left atrial enlargement and nonspecific T wave abnormality.  She was asymptomatic. She reports over the past year she has had an increased amount of stress recently.  She has had difficult controlling her blood pressure.  She has increased stress with her 's illness and helping to take care of her father and grandchildren.  She also has stress with recent medical bills.  She does note some intermittent discomfort in the middle of her chest that she points to with 1 finger that happens for about 5 seconds.  She not sure if it has a palpitation or pressure.  She thinks she may have occasional heart flutters.  Symptoms usually happen when she is stressed and may also happen after she eats.  She walks on the treadmill a mile to a mile and a half twice a week without any exertional symptoms.  She recently has lost 20 pounds.  She has never had a cardiac evaluation.  Patient is tearful today at her visit due to recent stress.    Cardiac risks: HTN, DM, obesity    Patient Active Problem List   Diagnosis   • Breast cancer (CMS/HCC)   • Type 2 diabetes mellitus without complication, without long-term current use of insulin (CMS/HCC)   • Acne vulgaris   • Atopic dermatitis   • Allergic rhinitis   • Deafness in right ear   • Anxiety and depression   • Essential hypertension   • History of malignant neoplasm of breast   • Obesity (BMI 30-39.9)        Past Medical History:   Diagnosis Date   • Cancer (CMS/HCC)     breast, chemo, radiation done   • Diabetes mellitus (CMS/HCC)    • H/O mastectomy    • Hypertension        Past Surgical History:   Procedure Laterality Date   • D&C HYSTEROSCOPY ENDOMETRIAL ABLATION DIAGNOSTIC LAPAROSCOPY  2009   • MASTECTOMY PARTIAL / LUMPECTOMY Left 2009       Family History   Problem Relation Age of Onset   • No Known Problems Mother    • Heart disease Father    • Emphysema Father    • COPD Father    • Hypertension Father    • Arthritis Father    • Hypertension Sister    • Hypertension Brother        Social History     Socioeconomic History   • Marital status:      Spouse name: Not on file   • Number of children: Not on file   • Years of education: Not on file   • Highest education level: Not on file   Tobacco Use   • Smoking status: Never Smoker   • Smokeless tobacco: Never Used   Substance and Sexual Activity   • Alcohol use: No   • Drug use: No   • Sexual activity: Defer   Social History Narrative    caffeine use: 1 diet pop daily, green tea daily       No Known Allergies      Current Outpatient Medications:   •  ACCU-CHEK FASTCLIX LANCETS misc, USE TO TEST BLOOD GLUCOSE THREE TIMES DAILY, Disp: , Rfl: 11  •  amLODIPine (NORVASC) 10 MG tablet, TAKE 1 TABLET BY MOUTH ONE TIME A DAY , Disp: 30 tablet, Rfl: 5  •  azelastine (ASTELIN) 0.1 % nasal spray, 2 sprays into the nostril(s) as directed by provider 2 (Two) Times a Day. Use in each nostril as directed, Disp: 1 each, Rfl: 12  •  cetirizine (zyrTEC) 10 MG tablet, TAKE 1 TABLET BY MOUTH ONE TIME A DAY , Disp: 30 tablet, Rfl: 0  •  clindamycin (CLEOCIN T) 1 % external solution, clindamycin phosphate 1 % topical solution  APPLY A THIN LAYER TO THE AFFECTED AREA(S) BY TOPICAL ROUTE 2 TIMES PER DAY, Disp: , Rfl:   •  desonide (DESOWEN) 0.05 % cream, desonide 0.05 % topical cream  APPLY TO AFFECTED AREA ON EYELIDS AND FACE BID X 2 WEEKS PRN FLARES, Disp: , Rfl:   •   doxycycline (VIBRAMYCIN) 100 MG capsule, Take 100 mg by mouth every night at bedtime., Disp: , Rfl:   •  Empagliflozin (JARDIANCE) 10 MG tablet, Take 10 mg by mouth Daily., Disp: 30 tablet, Rfl: 5  •  fluticasone (FLONASE) 50 MCG/ACT nasal spray, INHALE 2 SPRAYS IN EACH NOSTRIL ONE TIME A DAY , Disp: 16 g, Rfl: 10  •  hydrocortisone 2.5 % cream, APPLY A THIN LAYER TO THE AFFECTED AREA(S) ON THE FACE TOPICALLY TWICE DAILY FOR 2 WEEKS AS NEEDED FOR FLARES, Disp: , Rfl: 3  •  lisinopril-hydrochlorothiazide (PRINZIDE,ZESTORETIC) 10-12.5 MG per tablet, Take 1 tablet by mouth Daily. 200001, Disp: 30 tablet, Rfl: 2  •  metFORMIN (GLUCOPHAGE) 500 MG tablet, Take 2 tablets by mouth 2 (Two) Times a Day With Meals., Disp: 120 tablet, Rfl: 5  •  montelukast (SINGULAIR) 10 MG tablet, Take 1 tablet by mouth Every Night., Disp: 30 tablet, Rfl: 11  •  tacrolimus (PROTOPIC) 0.1 % ointment, Apply 1 application topically to the appropriate area as directed 2 (Two) Times a Day., Disp: , Rfl:   •  tiZANidine (ZANAFLEX) 4 MG tablet, Take 1 tablet by mouth At Night As Needed for Muscle Spasms., Disp: 30 tablet, Rfl: 5  •  traZODone (DESYREL) 50 MG tablet, Take 1 tablet by mouth Every Night., Disp: 30 tablet, Rfl: 5  •  tretinoin (RETIN-A) 0.01 % gel, tretinoin 0.01 % topical gel  APPLY TO THE AFFECTED AREA(S) ON FACE BY TOPICAL ROUTE ONCE DAILY AT BEDTIME, Disp: , Rfl:     The following portions of the patient's history were reviewed today and updated as appropriate: allergies, current medications, past family history, past medical history, past social history, past surgical history and problem list     Review of Systems   Constitution: Negative for chills and fever.   HENT: Negative.    Eyes: Negative.    Cardiovascular: Negative for chest pain, claudication, cyanosis, dyspnea on exertion, irregular heartbeat, leg swelling, near-syncope, orthopnea, palpitations, paroxysmal nocturnal dyspnea and syncope.   Respiratory: Negative for cough,  "shortness of breath and snoring.    Endocrine: Negative.    Hematologic/Lymphatic: Does not bruise/bleed easily.   Skin: Negative for poor wound healing.   Musculoskeletal: Negative.    Gastrointestinal: Negative for abdominal pain, heartburn, hematemesis, melena, nausea and vomiting.   Genitourinary: Negative.  Negative for hematuria.   Neurological: Negative.    Psychiatric/Behavioral: The patient is nervous/anxious.    Allergic/Immunologic: Negative.        Objective:     Vitals:    02/13/20 1450 02/13/20 1452 02/13/20 1453   BP: 128/99 131/83 123/85   BP Location: Right arm Right arm Left arm   Patient Position: Sitting Sitting Sitting   Cuff Size: Adult Adult Adult   Pulse: 103  91   Resp:   18   Temp:   97.4 °F (36.3 °C)   TempSrc:   Temporal   SpO2: 98%  100%   Weight:   92.1 kg (203 lb)   Height:   152.4 cm (60\")       Body mass index is 39.65 kg/m².    Physical Exam   Constitutional: She is oriented to person, place, and time. She appears well-developed and well-nourished. No distress.   HENT:   Head: Normocephalic.   Eyes: Pupils are equal, round, and reactive to light. Conjunctivae are normal.   Neck: Neck supple. No JVD present. No thyromegaly present.   Cardiovascular: Normal rate, regular rhythm, normal heart sounds and intact distal pulses. Exam reveals no gallop and no friction rub.   No murmur heard.  Pulmonary/Chest: Effort normal and breath sounds normal. No respiratory distress. She has no wheezes. She has no rales. She exhibits no tenderness.   Abdominal: Soft. Bowel sounds are normal.   Musculoskeletal: Normal range of motion. She exhibits no edema.   Neurological: She is alert and oriented to person, place, and time.   Skin: Skin is warm and dry.   Psychiatric: Her behavior is normal. Thought content normal. Her mood appears anxious.   Vitals reviewed.      Lab and Diagnostic Review:  Results for orders placed or performed in visit on 02/05/20   Comprehensive Metabolic Panel   Result Value Ref " Range    Glucose 107 (H) 65 - 99 mg/dL    BUN 14 6 - 20 mg/dL    Creatinine 0.92 0.57 - 1.00 mg/dL    eGFR Non African Am 64 >60 mL/min/1.73    eGFR African Am 78 >60 mL/min/1.73    BUN/Creatinine Ratio 15.2 7.0 - 25.0    Sodium 138 136 - 145 mmol/L    Potassium 5.6 (H) 3.5 - 5.2 mmol/L    Chloride 98 98 - 107 mmol/L    Total CO2 27.3 22.0 - 29.0 mmol/L    Calcium 10.1 8.6 - 10.5 mg/dL    Total Protein 7.7 6.0 - 8.5 g/dL    Albumin 4.80 3.50 - 5.20 g/dL    Globulin 2.9 gm/dL    A/G Ratio 1.7 g/dL    Total Bilirubin 0.3 0.2 - 1.2 mg/dL    Alkaline Phosphatase 93 39 - 117 U/L    AST (SGOT) 16 1 - 32 U/L    ALT (SGPT) 18 1 - 33 U/L   TSH   Result Value Ref Range    TSH 2.540 0.270 - 4.200 uIU/mL   Hemoglobin A1c   Result Value Ref Range    Hemoglobin A1C 7.30 (H) 4.80 - 5.60 %   Lipid Panel With / Chol / HDL Ratio   Result Value Ref Range    Total Cholesterol 174 0 - 200 mg/dL    Triglycerides 286 (H) 0 - 150 mg/dL    HDL Cholesterol 50 40 - 60 mg/dL    VLDL Cholesterol 57.2 mg/dL    LDL Cholesterol  67 0 - 100 mg/dL    Chol/HDL Ratio 3.48    Vitamin D 1,25 Dihydroxy   Result Value Ref Range    1,25-Dihydroxy, Vitamin D 58.0 19.9 - 79.3 pg/mL   Vitamin B12   Result Value Ref Range    Vitamin B-12 308 211 - 946 pg/mL   POCT microalbumin   Result Value Ref Range    Microalbumin, Urine 10 mg/L     Creatinine, Urine 200 mg/dL    POC Glycosylated Hemoglobin (Hb A1C)   Result Value Ref Range    Hemoglobin A1C 6.3 %   POC Urinalysis Dipstick, Automated   Result Value Ref Range    Color Yellow Yellow, Straw, Dark Yellow, Milady    Clarity, UA Clear Clear    Specific Gravity  1.025 1.005 - 1.030    pH, Urine 6.5 5.0 - 8.0    Leukocytes Negative Negative    Nitrite, UA Negative Negative    Protein, POC Negative Negative mg/dL    Glucose, UA Negative Negative, 1000 mg/dL (3+) mg/dL    Ketones, UA Negative Negative    Urobilinogen, UA Normal Normal    Bilirubin Negative Negative    Blood, UA Negative Negative   CBC & Differential    Result Value Ref Range    WBC 8.20 3.40 - 10.80 10*3/mm3    RBC 4.88 3.77 - 5.28 10*6/mm3    Hemoglobin 13.7 12.0 - 15.9 g/dL    Hematocrit 40.7 34.0 - 46.6 %    MCV 83.4 79.0 - 97.0 fL    MCH 28.1 26.6 - 33.0 pg    MCHC 33.7 31.5 - 35.7 g/dL    RDW 13.9 12.3 - 15.4 %    Platelets 316 140 - 450 10*3/mm3    Neutrophil Rel % 49.6 42.7 - 76.0 %    Lymphocyte Rel % 41.5 19.6 - 45.3 %    Monocyte Rel % 7.2 5.0 - 12.0 %    Eosinophil Rel % 0.7 0.3 - 6.2 %    Basophil Rel % 0.6 0.0 - 1.5 %    Neutrophils Absolute 4.07 1.70 - 7.00 10*3/mm3    Lymphocytes Absolute 3.40 (H) 0.70 - 3.10 10*3/mm3    Monocytes Absolute 0.59 0.10 - 0.90 10*3/mm3    Eosinophils Absolute 0.06 0.00 - 0.40 10*3/mm3    Basophils Absolute 0.05 0.00 - 0.20 10*3/mm3    Immature Granulocyte Rel % 0.4 0.0 - 0.5 %    Immature Grans Absolute 0.03 0.00 - 0.05 10*3/mm3    nRBC 0.0 0.0 - 0.2 /100 WBC     EKG: Normal sinus rhythm, possible left atrial enlargement, nonspecific T wave abnormality    Assessment and Plan:   1. Abnormal EKG  Mildly abnormal EKG, which could be related to body habitus. I explained to her that she may have some mild structural abnormalities that could be found on an echo secondary to HTN. However, she has no symptoms to warrant echo evaluation. She has no anginal like symptoms and at this time does not need stress test. However, she does have multiple ASCVD risks, and I told her I have a low threshold to do a stress test so I advised her to call with any new or worsening symptoms  For cardiac risk prevention, she may benefit from coronary calcium CTA scan in the future if she remains asymptomatic    2. Essential hypertension  Controlled    3. Type 2 diabetes mellitus without complication, without long-term current use of insulin (CMS/HCC)  Appears to be fairly well controlled until recently  She has been started on Jardiance    Patient is going to increase her exercise and monitor her symptoms and follow up in one month.         It  has been a pleasure to participate in the care of this patient.  Patient was instructed to call the [facility] with any questions, concerns, or worsening symptoms.    *Please note that portions of this note were completed with a voice recognition program. Efforts were made to edit the dictations, but occasionally words are mistranscribed.

## 2020-02-13 ENCOUNTER — OFFICE VISIT (OUTPATIENT)
Dept: CARDIOLOGY | Facility: HOSPITAL | Age: 50
End: 2020-02-13

## 2020-02-13 VITALS
RESPIRATION RATE: 18 BRPM | HEART RATE: 91 BPM | DIASTOLIC BLOOD PRESSURE: 85 MMHG | OXYGEN SATURATION: 100 % | HEIGHT: 60 IN | WEIGHT: 203 LBS | SYSTOLIC BLOOD PRESSURE: 123 MMHG | BODY MASS INDEX: 39.85 KG/M2 | TEMPERATURE: 97.4 F

## 2020-02-13 DIAGNOSIS — E11.9 TYPE 2 DIABETES MELLITUS WITHOUT COMPLICATION, WITHOUT LONG-TERM CURRENT USE OF INSULIN (HCC): ICD-10-CM

## 2020-02-13 DIAGNOSIS — R94.31 ABNORMAL EKG: Primary | ICD-10-CM

## 2020-02-13 DIAGNOSIS — I10 ESSENTIAL HYPERTENSION: ICD-10-CM

## 2020-02-13 PROCEDURE — 99214 OFFICE O/P EST MOD 30 MIN: CPT | Performed by: NURSE PRACTITIONER

## 2020-02-13 RX ORDER — DOXYCYCLINE HYCLATE 100 MG/1
100 CAPSULE ORAL
COMMUNITY
End: 2020-03-04

## 2020-02-17 ENCOUNTER — TELEPHONE (OUTPATIENT)
Dept: CARDIOLOGY | Facility: HOSPITAL | Age: 50
End: 2020-02-17

## 2020-02-17 DIAGNOSIS — E11.9 TYPE 2 DIABETES MELLITUS WITHOUT COMPLICATION, WITHOUT LONG-TERM CURRENT USE OF INSULIN (HCC): Primary | ICD-10-CM

## 2020-02-17 DIAGNOSIS — I10 ESSENTIAL HYPERTENSION: ICD-10-CM

## 2020-02-17 DIAGNOSIS — R07.9 CHEST PAIN, UNSPECIFIED TYPE: ICD-10-CM

## 2020-02-17 NOTE — TELEPHONE ENCOUNTER
Patient calling and inquiring about  having a stress done. She states her symptoms are not necessarily worst but she feels like a pen is poking her today. She denies having chest pain or shortness of breath.

## 2020-02-18 ENCOUNTER — TRANSCRIBE ORDERS (OUTPATIENT)
Dept: ADMINISTRATIVE | Facility: HOSPITAL | Age: 50
End: 2020-02-18

## 2020-02-24 DIAGNOSIS — E87.5 HYPERKALEMIA: Primary | ICD-10-CM

## 2020-02-24 DIAGNOSIS — E78.1 PURE HYPERTRIGLYCERIDEMIA: Primary | ICD-10-CM

## 2020-02-24 RX ORDER — FENOFIBRATE 145 MG/1
145 TABLET, COATED ORAL DAILY
Qty: 30 TABLET | Refills: 11 | Status: SHIPPED | OUTPATIENT
Start: 2020-02-24 | End: 2020-03-03

## 2020-02-25 ENCOUNTER — TELEPHONE (OUTPATIENT)
Dept: FAMILY MEDICINE CLINIC | Facility: CLINIC | Age: 50
End: 2020-02-25

## 2020-02-27 ENCOUNTER — OFFICE VISIT (OUTPATIENT)
Dept: FAMILY MEDICINE CLINIC | Facility: CLINIC | Age: 50
End: 2020-02-27

## 2020-02-27 VITALS
BODY MASS INDEX: 39.46 KG/M2 | TEMPERATURE: 97.5 F | HEART RATE: 87 BPM | RESPIRATION RATE: 18 BRPM | DIASTOLIC BLOOD PRESSURE: 90 MMHG | OXYGEN SATURATION: 96 % | WEIGHT: 201 LBS | SYSTOLIC BLOOD PRESSURE: 128 MMHG | HEIGHT: 60 IN

## 2020-02-27 DIAGNOSIS — R51.9 ACUTE NONINTRACTABLE HEADACHE, UNSPECIFIED HEADACHE TYPE: Primary | ICD-10-CM

## 2020-02-27 PROCEDURE — 96372 THER/PROPH/DIAG INJ SC/IM: CPT | Performed by: NURSE PRACTITIONER

## 2020-02-27 PROCEDURE — 99213 OFFICE O/P EST LOW 20 MIN: CPT | Performed by: NURSE PRACTITIONER

## 2020-02-27 RX ORDER — TRAMADOL HYDROCHLORIDE 50 MG/1
TABLET ORAL
COMMUNITY
Start: 2020-02-25 | End: 2020-09-01

## 2020-02-27 RX ORDER — FLUCONAZOLE 150 MG/1
TABLET ORAL
COMMUNITY
Start: 2020-02-15 | End: 2020-09-01

## 2020-02-27 RX ORDER — KETOROLAC TROMETHAMINE 30 MG/ML
60 INJECTION, SOLUTION INTRAMUSCULAR; INTRAVENOUS ONCE
Status: COMPLETED | OUTPATIENT
Start: 2020-02-27 | End: 2020-02-27

## 2020-02-27 RX ADMIN — KETOROLAC TROMETHAMINE 60 MG: 30 INJECTION, SOLUTION INTRAMUSCULAR; INTRAVENOUS at 18:02

## 2020-02-27 NOTE — PATIENT INSTRUCTIONS
General Headache Without Cause  A headache is pain or discomfort felt around the head or neck area. The specific cause of a headache may not be found. There are many causes and types of headaches. A few common ones are:  · Tension headaches.  · Migraine headaches.  · Cluster headaches.  · Chronic daily headaches.  Follow these instructions at home:  Watch your condition for any changes. Let your health care provider know about them. Take these steps to help with your condition:  Managing pain         · Take over-the-counter and prescription medicines only as told by your health care provider.  · Lie down in a dark, quiet room when you have a headache.  · If directed, put ice on your head and neck area:  ? Put ice in a plastic bag.  ? Place a towel between your skin and the bag.  ? Leave the ice on for 20 minutes, 2-3 times per day.  · If directed, apply heat to the affected area. Use the heat source that your health care provider recommends, such as a moist heat pack or a heating pad.  ? Place a towel between your skin and the heat source.  ? Leave the heat on for 20-30 minutes.  ? Remove the heat if your skin turns bright red. This is especially important if you are unable to feel pain, heat, or cold. You may have a greater risk of getting burned.  · Keep lights dim if bright lights bother you or make your headaches worse.  Eating and drinking  · Eat meals on a regular schedule.  · If you drink alcohol:  ? Limit how much you use to:  § 0-1 drink a day for women.  § 0-2 drinks a day for men.  ? Be aware of how much alcohol is in your drink. In the [place] one drink equals one 12 oz bottle of beer (355 mL), one 5 oz glass of wine (148 mL), or one 1½ oz glass of hard liquor (44 mL).  · Stop drinking caffeine, or decrease the amount of caffeine you drink.  General instructions    · Keep a headache journal to help find out what may trigger your headaches. For example, write down:  ? What you eat and drink.  ? How much  sleep you get.  ? Any change to your diet or medicines.  · Try massage or other relaxation techniques.  · Limit stress.  · Sit up straight, and do not tense your muscles.  · Do not use any products that contain nicotine or tobacco, such as cigarettes, e-cigarettes, and chewing tobacco. If you need help quitting, ask your health care provider.  · Exercise regularly as told by your health care provider.  · Sleep on a regular schedule. Get 7-9 hours of sleep each night, or the amount recommended by your health care provider.  · Keep all follow-up visits as told by your health care provider. This is important.  Contact a health care provider if:  · Your symptoms are not helped by medicine.  · You have a headache that is different from the usual headache.  · You have nausea or you vomit.  · You have a fever.  Get help right away if:  · Your headache becomes severe quickly.  · Your headache gets worse after moderate to intense physical activity.  · You have repeated vomiting.  · You have a stiff neck.  · You have a loss of vision.  · You have problems with speech.  · You have pain in the eye or ear.  · You have muscular weakness or loss of muscle control.  · You lose your balance or have trouble walking.  · You feel faint or pass out.  · You have confusion.  · You have a seizure.  Summary  · A headache is pain or discomfort felt around the head or neck area.  · There are many causes and types of headaches. In some cases, the cause may not be found.  · Keep a headache journal to help find out what may trigger your headaches. Watch your condition for any changes. Let your health care provider know about them.  · Contact a health care provider if you have a headache that is different from the usual headache, or if your symptoms are not helped by medicine.  · Get help right away if your headache becomes severe, you vomit, you have a loss of vision, you lose your balance, or you have a seizure.  This information is not  intended to replace advice given to you by your health care provider. Make sure you discuss any questions you have with your health care provider.  Document Released: 12/18/2006 Document Revised: 07/08/2019 Document Reviewed: 07/08/2019  [employer] Interactive Patient Education © 2020 [employer]    Migraine Headache  A migraine headache is an intense, throbbing pain on one side or both sides of the head. Migraines may also cause other symptoms, such as nausea, vomiting, and sensitivity to light and noise.  What are the causes?  Doing or taking certain things may also trigger migraines, such as:  · Alcohol.  · Smoking.  · Medicines, such as:  ? Medicine used to treat chest pain (nitroglycerine).  ? Birth control pills.  ? Estrogen pills.  ? Certain blood pressure medicines.  · Aged cheeses, chocolate, or caffeine.  · Foods or drinks that contain nitrates, glutamate, aspartame, or tyramine.  · Physical activity.  Other things that may trigger a migraine include:  · Menstruation.  · Pregnancy.  · Hunger.  · Stress, lack of sleep, too much sleep, or fatigue.  · Weather changes.  What increases the risk?  The following factors may make you more likely to experience migraine headaches:  · Age. Risk increases with age.  · Family history of migraine headaches.  · Being .  · Depression and anxiety.  · Obesity.  · Being a woman.  · Having a hole in the heart (patent foramen ovale) or other heart problems.  What are the signs or symptoms?  The main symptom of this condition is pulsating or throbbing pain. Pain may:  · Happen in any area of the head, such as on one side or both sides.  · Interfere with daily activities.  · Get worse with physical activity.  · Get worse with exposure to bright lights or loud noises.  Other symptoms may include:  · Nausea.  · Vomiting.  · Dizziness.  · General sensitivity to bright lights, loud noises, or smells.  Before you get a migraine, you may get warning signs that a migraine is  developing (aura). An aura may include:  · Seeing flashing lights or having blind spots.  · Seeing bright spots, halos, or zigzag lines.  · Having tunnel vision or blurred vision.  · Having numbness or a tingling feeling.  · Having trouble talking.  · Having muscle weakness.  How is this diagnosed?  A migraine headache can be diagnosed based on:  · Your symptoms.  · A physical exam.  · Tests, such as CT scan or MRI of the head. These imaging tests can help rule out other causes of headaches.  · Taking fluid from the spine (lumbar puncture) and analyzing it (cerebrospinal fluid analysis, or CSF analysis).  How is this treated?  A migraine headache is usually treated with medicines that:  · Relieve pain.  · Relieve nausea.  · Prevent migraines from coming back.  Treatment may also include:  · Acupuncture.  · Lifestyle changes like avoiding foods that trigger migraines.  Follow these instructions at home:  Medicines  · Take over-the-counter and prescription medicines only as told by your health care provider.  · Do not drive or use heavy machinery while taking prescription pain medicine.  · To prevent or treat constipation while you are taking prescription pain medicine, your health care provider may recommend that you:  ? Drink enough fluid to keep your urine clear or pale yellow.  ? Take over-the-counter or prescription medicines.  ? Eat foods that are high in fiber, such as fresh fruits and vegetables, whole grains, and beans.  ? Limit foods that are high in fat and processed sugars, such as fried and sweet foods.  Lifestyle  · Avoid alcohol use.  · Do not use any products that contain nicotine or tobacco, such as cigarettes and e-cigarettes. If you need help quitting, ask your health care provider.  · Get at least 8 hours of sleep every night.  · Limit your stress.  General instructions         · Keep a journal to find out what may trigger your migraine headaches. For example, write down:  ? What you eat and  drink.  ? How much sleep you get.  ? Any change to your diet or medicines.  · If you have a migraine:  ? Avoid things that make your symptoms worse, such as bright lights.  ? It may help to lie down in a dark, quiet room.  ? Do not drive or use heavy machinery.  ? Ask your health care provider what activities are safe for you while you are experiencing symptoms.  · Keep all follow-up visits as told by your health care provider. This is important.  Contact a health care provider if:  · You develop symptoms that are different or more severe than your usual migraine symptoms.  Get help right away if:  · Your migraine becomes severe.  · You have a fever.  · You have a stiff neck.  · You have vision loss.  · Your muscles feel weak or like you cannot control them.  · You start to lose your balance often.  · You develop trouble walking.  · You faint.  This information is not intended to replace advice given to you by your health care provider. Make sure you discuss any questions you have with your health care provider.  Document Released: 12/18/2006 Document Revised: 07/07/2017 Document Reviewed: 06/05/2017  [employer] Interactive Patient Education © 2020 [employer]  Ketorolac injection  What is this medicine?  KETOROLAC (zhang toe ROLE ak) is a non-steroidal anti-inflammatory drug (NSAID). It is used to treat moderate to severe pain for up to 5 days. It is commonly used after surgery. This medicine should not be used for more than 5 days.  This medicine may be used for other purposes; ask your health care provider or pharmacist if you have questions.  COMMON BRAND NAME(S): Toradol  What should I tell my health care provider before I take this medicine?  They need to know if you have any of these conditions:  -asthma, especially aspirin-sensitive asthma  -bleeding problems  -coronary artery bypass graft (CABG) surgery within the past 2 weeks  -kidney disease  -stomach bleed, ulcer, or other problem  -taking aspirin, other  NSAID, or probenecid  -an unusual or allergic reaction to ketorolac, tromethamine, aspirin, other NSAIDs, other medicines, foods, dyes or preservatives  -pregnant or trying to get pregnant  -breast-feeding  How should I use this medicine?  This medicine is for injection into a muscle or into a vein. It is given by a health care professional in a hospital or clinic setting.  Talk to your pediatrician regarding the use of this medicine in children. Special care may be needed.  Patients over 65 years old may have a stronger reaction and need a smaller dose.  Overdosage: If you think you have taken too much of this medicine contact a poison control center or emergency room at once.  NOTE: This medicine is only for you. Do not share this medicine with others.  What if I miss a dose?  This does not apply.  What may interact with this medicine?  Do not take this medicine with any of the following medications:  -aspirin and aspirin-like medicines  -cidofovir  -methotrexate  -NSAIDs, medicines for pain and inflammation, like ibuprofen or naproxen  -pentoxifylline  -probenecid  This medicine may also interact with the following medications:  -alcohol  -alendronate  -alprazolam  -carbamazepine  -diuretics  -flavocoxid  -fluoxetine  -ginkgo  -lithium  -medicines for blood pressure like enalapril  -medicines that affect platelets like pentoxifylline  -medicines that treat or prevent blood clots like heparin, warfarin  -muscle relaxants  -pemetrexed  -phenytoin  -thiothixene  This list may not describe all possible interactions. Give your health care provider a list of all the medicines, herbs, non-prescription drugs, or dietary supplements you use. Also tell them if you smoke, drink alcohol, or use illegal drugs. Some items may interact with your medicine.  What should I watch for while using this medicine?  Tell your doctor or healthcare professional if your symptoms do not start to get better or if they get worse.  This  medicine does not prevent heart attack or stroke. In fact, this medicine may increase the chance of a heart attack or stroke. The chance may increase with longer use of this medicine and in people who have heart disease. If you take aspirin to prevent heart attack or stroke, talk with your doctor or health care professional.  Do not take medicines such as ibuprofen and naproxen with this medicine. Side effects such as stomach upset, nausea, or ulcers may be more likely to occur. Many medicines available without a prescription should not be taken with this medicine.  This medicine can cause ulcers and bleeding in the stomach and intestines at any time during treatment. Do not smoke cigarettes or drink alcohol. These increase irritation to your stomach and can make it more susceptible to damage from this medicine. Ulcers and bleeding can happen without warning symptoms and can cause death.  This medicine can cause you to bleed more easily. Try to avoid damage to your teeth and gums when you brush or floss your teeth.  What side effects may I notice from receiving this medicine?  Side effects that you should report to your doctor or health care professional as soon as possible:  -allergic reactions like skin rash, itching or hives, swelling of the face, lips, or tongue  -breathing problems  -high blood pressure  -nausea, vomiting  -redness, blistering, peeling or loosening of the skin, including inside the mouth  -severe stomach pain  -signs and symptoms of bleeding such as bloody or black, tarry stools; red or dark-brown urine; spitting up blood or brown material that looks like coffee grounds; red spots on the skin; unusual bruising or bleeding from the eye, gums, or nose  -signs and symptoms of a blood clot changes in vision; chest pain; severe, sudden headache; trouble speaking; sudden numbness or weakness of the face, arm, or leg  -trouble passing urine or change in the amount of urine  -unexplained weight gain or  swelling  -unusually weak or tired  -yellowing of eyes or skin  Side effects that usually do not require medical attention (report to your doctor or health care professional if they continue or are bothersome):  -diarrhea  -dizziness  -headache  -heartburn  This list may not describe all possible side effects. Call your doctor for medical advice about side effects. You may report side effects to FDA at 5-947-FDA-1777.  Where should I keep my medicine?  This drug is given in a hospital or clinic and will not be stored at home.  NOTE: This sheet is a summary. It may not cover all possible information. If you have questions about this medicine, talk to your doctor, pharmacist, or health care provider.  © 2020 [employer] (2018-08-22 15:19:19)

## 2020-02-28 ENCOUNTER — HOSPITAL ENCOUNTER (OUTPATIENT)
Dept: CARDIOLOGY | Facility: HOSPITAL | Age: 50
Discharge: HOME | End: 2020-02-28
Admitting: NURSE PRACTITIONER

## 2020-02-28 ENCOUNTER — TELEPHONE (OUTPATIENT)
Dept: FAMILY MEDICINE CLINIC | Facility: CLINIC | Age: 50
End: 2020-02-28

## 2020-02-28 DIAGNOSIS — R07.9 CHEST PAIN, UNSPECIFIED TYPE: ICD-10-CM

## 2020-02-28 DIAGNOSIS — E11.9 TYPE 2 DIABETES MELLITUS WITHOUT COMPLICATION, WITHOUT LONG-TERM CURRENT USE OF INSULIN (HCC): ICD-10-CM

## 2020-02-28 DIAGNOSIS — I10 ESSENTIAL HYPERTENSION: ICD-10-CM

## 2020-02-28 LAB
BH CV STRESS BP STAGE 1: NORMAL
BH CV STRESS BP STAGE 2: NORMAL
BH CV STRESS DURATION MIN STAGE 1: 3
BH CV STRESS DURATION MIN STAGE 2: 3
BH CV STRESS DURATION MIN STAGE 3: 3
BH CV STRESS DURATION SEC STAGE 1: 0
BH CV STRESS DURATION SEC STAGE 2: 0
BH CV STRESS DURATION SEC STAGE 3: 1
BH CV STRESS GRADE STAGE 1: 10
BH CV STRESS GRADE STAGE 2: 12
BH CV STRESS GRADE STAGE 3: 14
BH CV STRESS HR STAGE 1: 121
BH CV STRESS HR STAGE 2: 142
BH CV STRESS HR STAGE 3: 169
BH CV STRESS METS STAGE 1: 5
BH CV STRESS METS STAGE 2: 7.5
BH CV STRESS METS STAGE 3: 10
BH CV STRESS PROTOCOL 1: NORMAL
BH CV STRESS RECOVERY BP: NORMAL MMHG
BH CV STRESS RECOVERY HR: 104 BPM
BH CV STRESS SPEED STAGE 1: 1.7
BH CV STRESS SPEED STAGE 2: 2.5
BH CV STRESS SPEED STAGE 3: 3.4
BH CV STRESS STAGE 1: 1
BH CV STRESS STAGE 2: 2
BH CV STRESS STAGE 3: 3
MAXIMAL PREDICTED HEART RATE: 169 BPM
PERCENT MAX PREDICTED HR: 100 %
STRESS BASELINE BP: NORMAL MMHG
STRESS BASELINE HR: 82 BPM
STRESS PERCENT HR: 118 %
STRESS POST ESTIMATED WORKLOAD: 10.1 METS
STRESS POST EXERCISE DUR MIN: 9 MIN
STRESS POST EXERCISE DUR SEC: 1 SEC
STRESS POST PEAK BP: NORMAL MMHG
STRESS POST PEAK HR: 169 BPM
STRESS TARGET HR: 144 BPM

## 2020-02-28 PROCEDURE — 93018 CV STRESS TEST I&R ONLY: CPT | Performed by: INTERNAL MEDICINE

## 2020-02-28 PROCEDURE — 93017 CV STRESS TEST TRACING ONLY: CPT

## 2020-02-28 NOTE — PROGRESS NOTES
Subjective   Teena Jacobson is a 50 y.o. female.     Migraine    This is a new problem. The current episode started in the past 7 days. The problem occurs daily. The problem has been waxing and waning. The pain is located in the occipital, temporal, frontal and bilateral region. Radiates to: neck. The quality of the pain is described as aching, throbbing and squeezing. The pain is moderate. Associated symptoms include neck pain. Pertinent negatives include no abdominal pain, abnormal behavior, back pain, blurred vision, dizziness, ear pain, eye pain, fever, hearing loss, loss of balance, muscle aches, nausea, numbness, phonophobia, photophobia, rhinorrhea, seizures, sinus pressure, sore throat, tingling, tinnitus, visual change, vomiting or weakness. Nothing aggravates the symptoms. She has tried NSAIDs for the symptoms. The treatment provided mild relief. There is no history of migraine headaches or migraines in the family.       The following portions of the patient's history were reviewed and updated as appropriate: allergies, current medications, past family history, past medical history, past social history, past surgical history and problem list.    Allergies as of 02/27/2020   • (No Known Allergies)       Current Outpatient Medications on File Prior to Visit   Medication Sig   • ACCU-CHEK FASTCLIX LANCETS misc USE TO TEST BLOOD GLUCOSE THREE TIMES DAILY   • amLODIPine (NORVASC) 10 MG tablet TAKE 1 TABLET BY MOUTH ONE TIME A DAY    • azelastine (ASTELIN) 0.1 % nasal spray 2 sprays into the nostril(s) as directed by provider 2 (Two) Times a Day. Use in each nostril as directed   • cetirizine (zyrTEC) 10 MG tablet TAKE 1 TABLET BY MOUTH ONE TIME A DAY    • clindamycin (CLEOCIN T) 1 % external solution clindamycin phosphate 1 % topical solution   APPLY A THIN LAYER TO THE AFFECTED AREA(S) BY TOPICAL ROUTE 2 TIMES PER DAY   • desonide (DESOWEN) 0.05 % cream desonide 0.05 % topical cream   APPLY TO  AFFECTED AREA ON EYELIDS AND FACE BID X 2 WEEKS PRN FLARES   • doxycycline (VIBRAMYCIN) 100 MG capsule Take 100 mg by mouth every night at bedtime.   • Empagliflozin (JARDIANCE) 10 MG tablet Take 10 mg by mouth Daily.   • fenofibrate (TRICOR) 145 MG tablet Take 1 tablet by mouth Daily.   • fluconazole (DIFLUCAN) 150 MG tablet    • fluticasone (FLONASE) 50 MCG/ACT nasal spray INHALE 2 SPRAYS IN EACH NOSTRIL ONE TIME A DAY    • hydrocortisone 2.5 % cream APPLY A THIN LAYER TO THE AFFECTED AREA(S) ON THE FACE TOPICALLY TWICE DAILY FOR 2 WEEKS AS NEEDED FOR FLARES   • lisinopril-hydrochlorothiazide (PRINZIDE,ZESTORETIC) 10-12.5 MG per tablet Take 1 tablet by mouth Daily. 200001   • metFORMIN (GLUCOPHAGE) 500 MG tablet Take 2 tablets by mouth 2 (Two) Times a Day With Meals.   • montelukast (SINGULAIR) 10 MG tablet Take 1 tablet by mouth Every Night.   • tacrolimus (PROTOPIC) 0.1 % ointment Apply 1 application topically to the appropriate area as directed 2 (Two) Times a Day.   • tiZANidine (ZANAFLEX) 4 MG tablet Take 1 tablet by mouth At Night As Needed for Muscle Spasms.   • traMADol (ULTRAM) 50 MG tablet    • traZODone (DESYREL) 50 MG tablet Take 1 tablet by mouth Every Night.   • tretinoin (RETIN-A) 0.01 % gel tretinoin 0.01 % topical gel   APPLY TO THE AFFECTED AREA(S) ON FACE BY TOPICAL ROUTE ONCE DAILY AT BEDTIME     No current facility-administered medications on file prior to visit.        Review of Systems   Constitutional: Positive for fatigue. Negative for appetite change, chills, diaphoresis and fever.   HENT: Positive for congestion. Negative for ear discharge, ear pain, facial swelling, hearing loss, postnasal drip, rhinorrhea, sinus pressure, sinus pain, sore throat, tinnitus and trouble swallowing.    Eyes: Negative for blurred vision, photophobia, pain and visual disturbance.   Respiratory: Negative.    Cardiovascular: Negative.    Gastrointestinal: Negative for abdominal pain, diarrhea, nausea and  vomiting.   Musculoskeletal: Positive for neck pain. Negative for back pain.   Neurological: Positive for headaches. Negative for dizziness, tingling, tremors, seizures, syncope, facial asymmetry, speech difficulty, weakness, light-headedness, numbness and loss of balance.   Psychiatric/Behavioral:        Patient states that she has been under a lot of stress recently.       Objective   Physical Exam   Constitutional: She is oriented to person, place, and time. Vital signs are normal. She appears well-developed and well-nourished. She is cooperative. She does not appear ill. No distress.   HENT:   Head: Normocephalic and atraumatic.   Right Ear: Tympanic membrane and external ear normal.   Left Ear: Tympanic membrane and external ear normal.   Nose: Nose normal.   Mouth/Throat: Uvula is midline, oropharynx is clear and moist and mucous membranes are normal.   Eyes: Pupils are equal, round, and reactive to light.   Neck: Normal range of motion. Neck supple. No thyromegaly present.   Cardiovascular: Normal rate, regular rhythm and normal heart sounds.   Pulmonary/Chest: Effort normal and breath sounds normal.   Lymphadenopathy:     She has no cervical adenopathy.   Neurological: She is alert and oriented to person, place, and time.   Skin: Skin is warm, dry and intact. No rash noted. She is not diaphoretic.   Psychiatric: She has a normal mood and affect. Her behavior is normal. Judgment and thought content normal.   Vitals reviewed.    Vitals:    02/27/20 1705   BP: 128/90   Pulse: 87   Resp: 18   Temp: 97.5 °F (36.4 °C)   SpO2: 96%     Body mass index is 39.26 kg/m².    Assessment/Plan   Teena was seen today for migraine.    Diagnoses and all orders for this visit:    Acute nonintractable headache, unspecified headache type  -     ketorolac (TORADOL) injection 60 mg    Discussed the nature of the medical condition(s) risks, complications, implications, management, safe and proper use of medications. Encouraged  medication compliance, and keeping scheduled follow up appointments with me and any other providers.     RTC if symptoms fail to improve.  To ER if symptoms worsen.

## 2020-02-28 NOTE — TELEPHONE ENCOUNTER
Patient was in yesterday at the acute clinic for a migraine and was given a shot which help with the migraine but then it came back but not sure if her fenofibrate or her jardiance is causing her migraines and dizziness. Patient is scared to take the medication because she ended up in the ER with her migraine. Please advise and call back.

## 2020-02-29 NOTE — TELEPHONE ENCOUNTER
Called - sent straight to voice mail.   Left message she may call back to office or make appointment with her provider.     Karina Landeros, APRN

## 2020-03-02 ENCOUNTER — TELEPHONE (OUTPATIENT)
Dept: CARDIOLOGY | Facility: HOSPITAL | Age: 50
End: 2020-03-02

## 2020-03-02 NOTE — TELEPHONE ENCOUNTER
Attempted to call patient over the phone to review stress test results.  Left message that stress test was low risk and will release results to my chart

## 2020-03-03 ENCOUNTER — OFFICE VISIT (OUTPATIENT)
Dept: FAMILY MEDICINE CLINIC | Facility: CLINIC | Age: 50
End: 2020-03-03

## 2020-03-03 VITALS
WEIGHT: 200 LBS | DIASTOLIC BLOOD PRESSURE: 78 MMHG | HEART RATE: 83 BPM | BODY MASS INDEX: 39.27 KG/M2 | RESPIRATION RATE: 18 BRPM | HEIGHT: 60 IN | TEMPERATURE: 98.4 F | SYSTOLIC BLOOD PRESSURE: 126 MMHG | OXYGEN SATURATION: 99 %

## 2020-03-03 DIAGNOSIS — E87.5 HYPERKALEMIA: ICD-10-CM

## 2020-03-03 DIAGNOSIS — E11.9 TYPE 2 DIABETES MELLITUS WITHOUT COMPLICATION, WITHOUT LONG-TERM CURRENT USE OF INSULIN (HCC): ICD-10-CM

## 2020-03-03 DIAGNOSIS — J02.9 SORE THROAT: ICD-10-CM

## 2020-03-03 DIAGNOSIS — Z20.828 EXPOSURE TO THE FLU: ICD-10-CM

## 2020-03-03 DIAGNOSIS — R51.9 FREQUENT HEADACHES: Primary | ICD-10-CM

## 2020-03-03 PROBLEM — J30.9 ALLERGIC RHINITIS: Status: RESOLVED | Noted: 2019-01-04 | Resolved: 2020-03-03

## 2020-03-03 LAB
EXPIRATION DATE: ABNORMAL
INTERNAL CONTROL: ABNORMAL
Lab: ABNORMAL
S PYO AG THROAT QL: NEGATIVE

## 2020-03-03 PROCEDURE — 87804 INFLUENZA ASSAY W/OPTIC: CPT | Performed by: NURSE PRACTITIONER

## 2020-03-03 PROCEDURE — 87880 STREP A ASSAY W/OPTIC: CPT | Performed by: NURSE PRACTITIONER

## 2020-03-03 PROCEDURE — 99214 OFFICE O/P EST MOD 30 MIN: CPT | Performed by: NURSE PRACTITIONER

## 2020-03-03 NOTE — PATIENT INSTRUCTIONS
Hyperkalemia  Hyperkalemia occurs when the level of potassium in your blood is too high. Potassium is an important nutrient that helps the muscles and nerves function normally. It affects how the heart works, and it helps keep fluids and minerals balanced in the body. If there is too much potassium in your blood, it can affect your heart's ability to function normally.  Potassium is normally removed (excreted) from the body by the kidneys. Hyperkalemia can result from various conditions. It can range from mild to severe.  What are the causes?  This condition may be caused by:  · Taking in too much potassium. You can do this by:  ? Using salt substitutes. They contain large amounts of potassium.  ? Taking potassium supplements.  ? Eating foods that are high in potassium.  · Excreting too little potassium. This can happen if:  ? Your kidneys are not working properly. Kidney (renal) disease, including short-term or long-term renal failure, is a common cause of hyperkalemia.  ? You are taking medicines that lower your excretion of potassium.  ? You have Ron's disease.  ? You have a urinary tract blockage, such as kidney stones.  ? You are on treatment to mechanically clean your blood (dialysis) and you skip a treatment.  · Releasing a high amount of potassium from your cells into your blood. This can happen with:  ? Injury to muscles (rhabdomyolysis) or other tissues. Most potassium is stored in your muscles.  ? Severe burns or infections.  ? Acidic blood plasma (acidosis). Acidosis can result from many diseases, such as uncontrolled diabetes.  What increases the risk?  The following factors may make you more likely to develop this condition:  · Kidney disease. This puts you at the highest risk.  · Ron's disease. This is a condition where the adrenal glands do not produce enough hormones.  · Alcoholism or heavy drug use.  · Using certain blood pressure medicines, such as ACE inhibitors, angiotensin II receptor  blockers (ARBs), or potassium-sparing diuretics such as spironolactone.  · Severe injury or burn.  What are the signs or symptoms?  In many cases, there are no symptoms. However, when your potassium level becomes high enough, you may have symptoms such as:  · An irregular or very slow heartbeat.  · Nausea.  · Tiredness (fatigue).  · Confusion.  · Tingling of your skin or numbness of your hands or feet.  · Muscle cramps.  · Muscle weakness.  · Not being able to move (paralysis).  How is this diagnosed?  This condition may be diagnosed based on:  · Your symptoms and medical history. Your health care provider will ask about your use of prescription and non-prescription drugs.  · A physical exam.  · Blood tests.  · An electrocardiogram (ECG).  How is this treated?  Treatment depends on the cause and severity of your condition. Treatment may need to be done in the hospital setting. Treatment may include:  · IV glucose (sugar) along with insulin to shift potassium out of your blood and into your cells.  · A medicine called albuterol to shift potassium out of your blood and into your cells.  · Medicines to remove the potassium from your body.  · Dialysis to remove the potassium from your body.  · Calcium to protect your heart from the effects of high potassium, such as irregular rhythms (arrhythmias).  Follow these instructions at home:    · Take over-the-counter and prescription medicines only as told by your health care provider.  · Do not take any supplements, natural products, herbs, or vitamins without reviewing them with your health care provider. Certain supplements and natural food products contain high amounts of potassium.  · Limit your alcohol intake as told by your health care provider.  · Do not use drugs. If you need help quitting, ask your health care provider.  · If you have kidney disease, you may need to follow a low-potassium diet. A dietitian can help you learn which foods have high or low amounts of  potassium.  · Keep all follow-up visits as told by your health care provider. This is important.  Contact a health care provider if you:  · Have an irregular or very slow heartbeat.  · Feel light-headed.  · Feel weak.  · Are nauseous.  · Have tingling or numbness in your hands or feet.  Get help right away if you:  · Have shortness of breath.  · Have chest pain or discomfort.  · Pass out.  · Have muscle paralysis.  Summary  · Hyperkalemia occurs when the level of potassium in your blood is too high.  · This condition may be caused by taking in too much potassium, excreting too little potassium, or releasing a high amount of potassium from your cells into your blood.  · Hyperkalemia can result from many underlying conditions, especially chronic kidney disease, or from taking certain medicines.  · Treatment of hyperkalemia may include medicine to shift potassium out of your blood and into your cells or to remove the potassium from your body.  · If you have kidney disease, you may need to follow a low-potassium diet. A dietitian can help you learn which foods have high or low amounts of potassium.  This information is not intended to replace advice given to you by your health care provider. Make sure you discuss any questions you have with your health care provider.  Document Released: 12/08/2003 Document Revised: 12/03/2018 Document Reviewed: 12/03/2018  [employer] Interactive Patient Education © 2020 [employer]  Metformin; Sitagliptin Oral Tablets  What is this medicine?  METFORMIN; SITAGLIPTIN (met FOR min; sit a GLIP tin) is a combination of 2 medicines used to treat type 2 diabetes. This medicine lowers blood sugar. Treatment is combined with a balanced diet and exercise.  This medicine may be used for other purposes; ask your health care provider or pharmacist if you have questions.  COMMON BRAND NAME(S): Lizbet  What should I tell my health care provider before I take this medicine?  They need to know if you  have any of these conditions:  -anemia  -dehydration  -gallbladder disease  -heart disease  -heart failure  -high levels of triglycerides in the blood  -history of diabetic ketoacidosis  -if you often drink alcohol  -kidney disease  -liver disease  -low levels of vitamin B12 in the blood  -pancreatitis  -polycystic ovary syndrome  -previous swelling of the tongue, face, or lips with difficulty breathing, difficulty swallowing, hoarseness, or tightening of the throat  -serious infection or injury  -thyroid disease  -type 1 diabetes  -an unusual or allergic reaction to metformin, sitagliptin, other medicines, foods, dyes, or preservatives  -pregnant or trying to get pregnant  -breast-feeding  How should I use this medicine?  Take this medicine by mouth with a glass of water. Follow the directions on the prescription label. Take this medicine with food. Take your doses at regular intervals. Do not take your medicine more often than directed. Do not stop taking except on your doctor's advice.  A special MedGuide will be given to you by the pharmacist with each prescription and refill. Be sure to read this information carefully each time.  Talk to your pediatrician regarding the use of this medicine in children. Special care may be needed.  Overdosage: If you think you have taken too much of this medicine contact a poison control center or emergency room at once.  NOTE: This medicine is only for you. Do not share this medicine with others.  What if I miss a dose?  If you miss a dose, take it as soon as you can. If it is almost time for your next dose, take only that dose. Do not take double or extra doses.  What may interact with this medicine?  Do not take this medicine with any of the following medications:  -certain contrast medicines given before X-rays, CT scans, MRI, or other procedures  -dofetilide  This medicine may also interact with the following medications:  -acetazolamide  -alcohol  -certain antivirals for  HIV or hepatitis  -certain medicines for blood pressure, heart disease, irregular heart beat  -cimetidine  -dichlorphenamide  -digoxin  -diuretics  -female hormones, like estrogens or progestins and birth control pills  -glycopyrrolate  -isoniazid  -lamotrigine  -memantine  -methazolamide  -metoclopramide  -midodrine  -niacin  -phenothiazines like chlorpromazine, mesoridazine, prochlorperazine, thioridazine  -phenytoin  -ranolazine  -steroid medicines like prednisone or cortisone  -stimulant medicines for attention disorders, weight loss, or to stay awake  -thyroid medicines  -topiramate  -trospium  -vandetanib  -zonisamide  This list may not describe all possible interactions. Give your health care provider a list of all the medicines, herbs, non-prescription drugs, or dietary supplements you use. Also tell them if you smoke, drink alcohol, or use illegal drugs. Some items may interact with your medicine.  What should I watch for while using this medicine?  Visit your doctor or health care professional for regular checks on your progress.  A test called the HbA1C (A1C) will be monitored. This is a simple blood test. It measures your blood sugar control over the last 2 to 3 months. You will receive this test every 3 to 6 months.  Learn how to check your blood sugar. Learn the symptoms of high or low blood sugar and how to manage them.  Always carry a quick-source of sugar with you in case you have symptoms of low blood sugar. Examples include hard sugar candy or glucose tablets. Make sure others know that you can choke if you eat or drink when you develop serious symptoms of low blood sugar, such as seizures or unconsciousness. They must get medical help at once.  Tell your doctor or health care professional if you have high blood sugar. You might need to change the dose of your medicine. If you are sick or exercising more than usual, you might need to change the dose of your medicine.  Do not skip meals. Ask your  doctor or health care professional if you should avoid alcohol. Many nonprescription cough and cold products contain sugar or alcohol. These can affect blood sugar.  This medicine may cause ovulation in premenopausal women who do not have regular monthly periods. This may increase your chances of becoming pregnant. You should not take this medicine if you become pregnant or think you may be pregnant. Talk with your doctor or health care professional about your birth control options while taking this medicine. Contact your doctor or health care professional right away if you think you are pregnant.  If you are going to need surgery, a MRI, CT scan, or other procedure, tell your doctor that you are taking this medicine. You may need to stop taking this medicine before the procedure.  Wear a medical ID bracelet or chain, and carry a card that describes your disease and details of your medicine and dosage times.  This medicine may cause a decrease in folic acid and vitamin B12. You should make sure that you get enough vitamins while you are taking this medicine. Discuss the foods you eat and the vitamins you take with your health care professional.  What side effects may I notice from receiving this medicine?  Side effects that you should report to your doctor or health care professional as soon as possible:  -allergic reactions like skin rash, itching or hives, swelling of the face, lips, or tongue  -breathing problems  -feeling faint or lightheaded, falls  -joint pain  -muscle aches or pains  -redness, blistering, peeling or loosening of the skin, including inside the mouth  -signs and symptoms of heart failure like breathing problems, fast, irregular heartbeat, sudden weight gain; swelling of the ankles, feet, hands; unusually weak or tired  -signs and symptoms of low blood sugar such as feeling anxious, confusion, dizziness, increased hunger, unusually weak or tired, sweating, shakiness, cold, irritable, headache,  blurred vision, fast heartbeat, loss of consciousness  -signs and symptoms of muscle injury like dark urine; trouble passing urine or change in the amount of urine; unusually weak or tired; muscle pain; back pain  -slow or irregular heartbeat  -unusual stomach upset or pain  -vomiting  Side effects that usually do not require medical attention (report to your doctor or health care professional if they continue or are bothersome):  -diarrhea  -gas  -headache  -heartburn  -metallic taste in mouth  -nausea  -runny or stuffy nose  -sore throat  -upset stomach  This list may not describe all possible side effects. Call your doctor for medical advice about side effects. You may report side effects to FDA at 6-420-GGI-9253.  Where should I keep my medicine?  Keep out of the reach of children.  Store at room temperature between 15 and 30 degrees C (59 and 86 degrees F). Throw away any unused medicine after the expiration date.  NOTE: This sheet is a summary. It may not cover all possible information. If you have questions about this medicine, talk to your doctor, pharmacist, or health care provider.  © 2020 [employer] (2019-07-17 14:59:58)    Type 1 Diabetes Mellitus, Self Care, Adult  Caring for yourself after you have been diagnosed with type 1 diabetes (type 1 diabetes mellitus) means keeping your blood sugar (glucose) under control. You can do that with a balance of:  · Insulin.  · Nutrition.  · Exercise.  · Lifestyle changes.  · Other medicines, if necessary.  · Support from your team of health care providers and others.  The following information explains what you need to know to manage your diabetes at home.  What are the risks?  Having diabetes can put you at risk for other long-term (chronic) conditions, such as heart disease and kidney disease. Along with insulin, your health care provider may prescribe medicines to help prevent complications from diabetes. These medicines may  include:  · Aspirin.  · Medicine to lower cholesterol.  · Medicine to control blood pressure.  How to monitor blood glucose    · Check your blood glucose every day, as often as told by your health care provider.  · Have your A1c (hemoglobin A1c) level checked two or more times a year, or as often as told by your health care provider.  Your health care provider will set individualized treatment goals for you. Generally, the goal of treatment is to maintain the following blood glucose levels:  · Before meals (preprandial):  mg/dL (4.4-7.2 mmol/L).  · After meals (postprandial): below 180 mg/dL (10 mmol/L).  · A1c level: less than 7%.  How to manage hyperglycemia and hypoglycemia  Hyperglycemia symptoms  Hyperglycemia, also called high blood glucose, occurs when blood glucose is too high. Make sure you know the early signs of hyperglycemia, such as:  · Increased thirst.  · Hunger.  · Feeling very tired.  · Needing to urinate more often than usual.  · Blurry vision.  Hypoglycemia symptoms  Hypoglycemia, also called low blood glucose, occurs with a blood glucose level at or below 70 mg/dL (3.9 mmol/L). The risk for hypoglycemia increases during or after exercise, during sleep, during illness, and when skipping meals or not eating for a long time (fasting). Symptoms may include:  · Hunger.  · Anxiety.  · Sweating and feeling clammy.  · Confusion.  · Dizziness or feeling light-headed.  · Sleepiness.  · Nausea.  · Increased heart rate.  · Headache.  · Blurry vision.  · Irritability.  · Tingling or numbness around the mouth, lips, or tongue.  · A change in coordination.  · Restless sleep.  · Fainting.  · Seizure.  It is important to know the symptoms of hypoglycemia and treat it right away. Always have a 15-gram rapid-acting carbohydrate snack with you to treat low blood glucose. Family members and close friends should also know the symptoms and should understand how to treat hypoglycemia, in case you are not able to  treat yourself.  Treating hypoglycemia  If you are alert and able to swallow safely, follow the 15:15 rule:  · Take 15 grams of a rapid-acting carbohydrate. Talk with your health care provider about how much you should take.  · Rapid-acting options include:  ? Glucose pills (take 15 grams).  ? 6-8 pieces of hard candy.  ? 4-6 oz (120-150 mL) of fruit juice.  ? 4-6 oz (120-150 mL) of regular (not diet) soda.  ? Honey or sugar (1 Tbsp).  · Check your blood glucose 15 minutes after you take the carbohydrate.  · If the repeat blood glucose level is still at or below 70 mg/dL (3.9 mmol/L), take 15 grams of a carbohydrate again.  · If your blood glucose level does not increase above 70 mg/dL (3.9 mmol/L) after 3 tries, seek emergency medical care.  · After your blood glucose returns to normal, eat a meal or a snack within 1 hour.  Treating severe hypoglycemia  Severe hypoglycemia is when your blood glucose level is at or below 54 mg/dL (3 mmol/L). Severe hypoglycemia is an emergency. Do not wait to see if the symptoms will go away. Get medical help right away. Call your local emergency services (911 in the [place]  If you have severe hypoglycemia, and you cannot eat or drink, you may need an injection of glucagon. A family member or close friend should learn how to check your blood glucose and how to give you a glucagon injection. Ask your health care provider if you need to have an emergency glucagon injection kit available.  Severe hypoglycemia may need to be treated in a hospital. The treatment may include getting glucose through an IV. You may also need treatment for the cause of your hypoglycemia.  Follow these instructions at home:  Take diabetes medicines as told  · Take your insulin and other diabetes medicines every day as told.  · Do not run out of insulin or other diabetes medicines that you take. Plan ahead so you always have these available.  · Adjust your insulin dosage based on how physically active you are  and what foods you eat. Your health care provider will tell you how to adjust your dosage.  Make healthy food choices    The things that you eat and drink affect your blood glucose and your insulin dosage. Making good choices helps to control your diabetes and prevent other health problems. A healthy meal plan includes eating lean proteins, complex carbohydrates, fresh fruits and vegetables, low-fat dairy products, and healthy fats.  Make an appointment to see a diet and nutrition specialist (registered dietitian) to help you create an eating plan that is right for you. Make sure that you:  · Follow instructions from your health care provider about eating or drinking restrictions.  · Drink enough fluid to keep your urine pale yellow.  · Keep a record of the carbohydrates that you eat. Do this by reading food labels and learning the standard serving sizes of foods.  · Follow your sick day plan whenever you cannot eat or drink as usual. Make this plan in advance with your health care provider.    Stay active  Exercise regularly, as told by your health care provider. This may include:  · Stretching and doing strength exercises, such as yoga or weightlifting, 2 or more times a week.  · Doing 150 minutes or more of moderate-intensity or vigorous-intensity exercise each week. This could be brisk walking, biking, or water aerobics.  ? Spread out your activity over 3 or more days of the week.  ? Do not go more than 2 days in a row without doing some kind of physical activity.  When you start a new exercise or activity, work with your health care provider to adjust your insulin or food intake as needed.  Make healthy lifestyle choices  · Do not use any tobacco products, such as cigarettes, chewing tobacco, and e-cigarettes. If you need help quitting, ask your health care provider.  · If your health care provider says that alcohol is safe for you, limit alcohol intake to no more than 1 drink per day for nonpregnant women and  2 drinks per day for men. One drink equals 12 oz of beer, 5 oz of wine, or 1½ oz of hard liquor.  · Learn to manage stress. If you need help with this, ask your health care provider.  Care for your body    · Keep your immunizations up to date. In addition to getting vaccinations as told by your health care provider, it is recommended that you get vaccinated against the following illnesses:  ? The flu (influenza). Get a flu shot every year.  ? Pneumonia.  ? Hepatitis B.  · Schedule an eye exam within 3-5 years after your diagnosis, and then one time every year after that.  · Check your skin and feet every day for cuts, bruises, redness, blisters, or sores. Schedule a foot exam with your health care provider 3-5 years after your diagnosis, and then every year after the first exam.  · Brush your teeth and gums two times a day, and floss one or more times a day. Visit your dentist one or more times every 6 months.  · Maintain a healthy weight.  General instructions  · Take over-the-counter and prescription medicines only as told by your health care provider.  · Share your diabetes management plan with people in your workplace, school, and household.  · Check your urine for ketones when you are ill and as told by your health care provider.  · Carry a medical alert card or wear medical alert jewelry.  · Keep all follow-up visits as told by your health care provider. This is important.  Questions to ask your health care provider  · Do I need to meet with a diabetes educator?  · Where can I find a support group for people with diabetes?  Where to find more information  For more information about diabetes, visit:  · American Diabetes Association (ADA): www.diabetes.org  · American Association of Diabetes Educators (AADE): www.diabeteseducator.org  Summary  · Caring for yourself after you have been diagnosed with type 1 diabetes (type 1 diabetes mellitus) means keeping your blood sugar (glucose) under control. You can do that  with a balance of insulin and other medicines, nutrition, exercise, and lifestyle changes.  · Check your blood glucose every day, as often as told by your health care provider.  · Along with insulin, your health care provider may prescribe medicines to help prevent complications from diabetes.  · Keep all follow-up visits as told by your health care provider. This is important.  This information is not intended to replace advice given to you by your health care provider. Make sure you discuss any questions you have with your health care provider.  Document Released: 04/10/2017 Document Revised: 06/10/2019 Document Reviewed: 01/20/2017  [employer] Patient Education © 2020 [employer]    High Triglycerides Eating Plan  Triglycerides are a type of fat in the blood. High levels of triglycerides can increase your risk of heart disease and stroke. If your triglyceride levels are high, choosing the right foods can help lower your triglycerides and keep your heart healthy. Work with your health care provider or a diet and nutrition specialist (dietitian) to develop an eating plan that is right for you.  What are tips for following this plan?  General guidelines    · Lose weight, if you are overweight. For most people, losing 5-10 lbs (2-5 kg) helps lower triglyceride levels. A weight-loss plan may include.  ? 30 minutes of exercise at least 5 days a week.  ? Reducing the amount of calories, sugar, and fat you eat.  · Eat a wide variety of fresh fruits, vegetables, and whole grains. These foods are high in fiber.  · Eat foods that contain healthy fats, such as fatty fish, nuts, seeds, and olive oil.  · Avoid foods that are high in added sugar, added salt (sodium), saturated fat, and trans fat.  · Avoid low-fiber, refined carbohydrates such as white bread, crackers, noodles, and white rice.  · Avoid foods with partially hydrogenated oils (trans fats), such as fried foods or stick margarine.  · Limit alcohol intake  to no more than 1 drink a day for nonpregnant women and 2 drinks a day for men. One drink equals 12 oz of beer, 5 oz of wine, or 1½ oz of hard liquor. Your health care provider may recommend that you drink less depending on your overall health.  Reading food labels  · Check food labels for the amount of saturated fat. Choose foods with no or very little saturated fat.  · Check food labels for the amount of trans fat. Choose foods with no trans fat.  · Check food labels for the amount of cholesterol. Choose foods low in cholesterol. Ask your dietitian how much cholesterol you should have each day.  · Check food labels for the amount of sodium. Choose foods with less than 140 milligrams (mg) per serving.  Shopping  · Buy dairy products labeled as nonfat (skim) or low-fat (1%).  · Avoid buying processed or prepackaged foods. These are often high in added sugar, sodium, and fat.  Cooking  · Choose healthy fats when cooking, such as olive oil or canola oil.  · Cook foods using lower fat methods, such as baking, broiling, boiling, or grilling.  · Make your own sauces, dressings, and marinades when possible, instead of buying them. Store-bought sauces, dressings, and marinades are often high in sodium and sugar.  Meal planning  · Eat more home-cooked food and less restaurant, buffet, and fast food.  · Eat fatty fish at least 2 times each week. Examples of fatty fish include salmon, trout, mackerel, tuna, and herring.  · If you eat whole eggs, do not eat more than 3 egg yolks per week.  What foods are recommended?  The items listed may not be a complete list. Talk with your dietitian about what dietary choices are best for you.  Grains  Whole wheat or whole grain breads, crackers, cereals, and pasta. Unsweetened oatmeal. Bulgur. Barley. Quinoa. Brown rice. Whole wheat flour tortillas.  Vegetables  Fresh or frozen vegetables. Low-sodium canned vegetables.  Fruits  All fresh, canned (in natural juice), or frozen  fruits.  Meats and other protein foods  Skinless chicken or turkey. Ground chicken or turkey. Lean cuts of pork, trimmed of fat. Fish and seafood, especially salmon, trout, and herring. Egg whites. Dried beans, peas, or lentils. Unsalted nuts or seeds. Unsalted canned beans. Natural peanut or almond butter.  Dairy  Low-fat dairy products. Skim or low-fat (1%) milk. Reduced fat (2%) and low-sodium cheese. Low-fat ricotta cheese. Low-fat cottage cheese. Plain, low-fat yogurt.  Fats and oils  Tub margarine without trans fats. Light or reduced-fat mayonnaise. Light or reduced-fat salad dressings. Avocado. Safflower, olive, sunflower, soybean, and canola oils.  What foods are not recommended?  The items listed may not be a complete list. Talk with your dietitian about what dietary choices are best for you.  Grains  White bread. White (regular) pasta. White rice. Cornbread. Bagels. Pastries. Crackers that contain trans fat.  Vegetables  Creamed or fried vegetables. Vegetables in a cheese sauce.  Fruits  Sweetened dried fruit. Canned fruit in syrup. Fruit juice.  Meats and other protein foods  Fatty cuts of meat. Ribs. Chicken wings. Castro. Sausage. Bologna. Salami. Chitterlings. Fatback. Hot dogs. Bratwurst. Packaged lunch meats.  Dairy  Whole or reduced-fat (2%) milk. Half-and-half. Cream cheese. Full-fat or sweetened yogurt. Full-fat cheese. Nondairy creamers. Whipped toppings. Processed cheese or cheese spreads. Cheese curds.  Beverages  Alcohol. Sweetened drinks, such as soda, lemonade, fruit drinks, or punches.  Fats and oils  Butter. Stick margarine. Lard. Shortening. Ghee. Castro fat. Tropical oils, such as coconut, palm kernel, or palm oils.  Sweets and desserts  Corn syrup. Sugars. Honey. Molasses. Candy. Jam and jelly. Syrup. Sweetened cereals. Cookies. Pies. Cakes. Donuts. Muffins. Ice cream.  Condiments  Store-bought sauces, dressings, and marinades that are high in sugar, such as ketchup and barbecue  sauce.  Summary  · High levels of triglycerides can increase the risk of heart disease and stroke. Choosing the right foods can help lower your triglycerides.  · Eat plenty of fresh fruits, vegetables, and whole grains. Choose low-fat dairy and lean meats. Eat fatty fish at least twice a week.  · Avoid processed and prepackaged foods with added sugar, sodium, saturated fat, and trans fat.  · If you need suggestions or have questions about what types of food are good for you, talk with your health care provider or a dietitian.  This information is not intended to replace advice given to you by your health care provider. Make sure you discuss any questions you have with your health care provider.  Document Released: 10/05/2005 Document Revised: 02/20/2018 Document Reviewed: 02/20/2018  Elsevier Interactive Patient Education © 2020 [employer]

## 2020-03-04 ENCOUNTER — TELEPHONE (OUTPATIENT)
Dept: FAMILY MEDICINE CLINIC | Facility: CLINIC | Age: 50
End: 2020-03-04

## 2020-03-04 PROBLEM — Z85.3 HISTORY OF MALIGNANT NEOPLASM OF BREAST: Status: RESOLVED | Noted: 2019-10-03 | Resolved: 2020-03-04

## 2020-03-04 PROBLEM — F41.9 ANXIETY: Status: ACTIVE | Noted: 2020-02-28

## 2020-03-04 PROBLEM — G43.909 MIGRAINE HEADACHE: Status: ACTIVE | Noted: 2020-02-28

## 2020-03-04 PROBLEM — C50.919 BREAST CANCER (HCC): Status: RESOLVED | Noted: 2019-01-04 | Resolved: 2020-03-04

## 2020-03-04 LAB
BUN SERPL-MCNC: 12 MG/DL (ref 6–20)
BUN/CREAT SERPL: 14 (ref 7–25)
CALCIUM SERPL-MCNC: 9.6 MG/DL (ref 8.6–10.5)
CHLORIDE SERPL-SCNC: 94 MMOL/L (ref 98–107)
CO2 SERPL-SCNC: 25.9 MMOL/L (ref 22–29)
CREAT SERPL-MCNC: 0.86 MG/DL (ref 0.57–1)
EXPIRATION DATE: NORMAL
FLUAV AG NPH QL: NEGATIVE
FLUBV AG NPH QL: NEGATIVE
GLUCOSE SERPL-MCNC: 116 MG/DL (ref 65–99)
INTERNAL CONTROL: NORMAL
Lab: NORMAL
POTASSIUM SERPL-SCNC: 3.9 MMOL/L (ref 3.5–5.2)
SODIUM SERPL-SCNC: 135 MMOL/L (ref 136–145)

## 2020-03-04 NOTE — TELEPHONE ENCOUNTER
Patient states that she has spoke to you in regards to stress test and wants to know if it is necessary to keep appointment on the 16th. Is it okay to cancel?

## 2020-03-04 NOTE — PROGRESS NOTES
Subjective   Teena Jacobson is a 50 y.o. female.     Ms. Jacobson presents today s/p ER visit for migraine headache. Patient was seen by me on 2/27/20 for migraine and was given a Toradol injection. Patient states that her headache did not improve and she ended up going to ER for further evaluation. She states that she is concerned that two of the medications she has recently started, Jardiance and fenofibrate may be causing her headaches so she discontinued taking them. She wishes to discuss other options for treating her diabetes and elevated triglycerides.   Also, patient states that her  has the flu and she would like to be tested. She is c/o malaise and a mild sore throat today.  Lastly, patient had an elevated potassium level on her recent lab work and was advised to return for repeat labs.    Headache    This is a new problem. The current episode started 1 to 4 weeks ago. The problem occurs intermittently. The problem has been waxing and waning. The pain is located in the occipital, temporal and frontal region. The pain does not radiate. The pain quality is similar to prior headaches. The quality of the pain is described as aching and squeezing. The pain is mild. Associated symptoms include a sore throat. Pertinent negatives include no abnormal behavior, dizziness, ear pain, fever, loss of balance, numbness, phonophobia, photophobia, seizures, tingling, tinnitus, visual change, vomiting or weakness. She has tried ketorolac injections for the symptoms.   Hypertension   This is a chronic problem. The current episode started more than 1 year ago. The problem has been gradually improving since onset. The problem is controlled. Associated symptoms include anxiety and headaches. Agents associated with hypertension include NSAIDs. Risk factors for coronary artery disease include diabetes mellitus, dyslipidemia, family history and stress. Compliance problems include medication side effects.        The  following portions of the patient's history were reviewed and updated as appropriate: allergies, current medications, past family history, past medical history, past social history, past surgical history and problem list.    Allergies as of 03/03/2020   • (No Known Allergies)       Current Outpatient Medications on File Prior to Visit   Medication Sig   • ACCU-CHEK FASTCLIX LANCETS misc USE TO TEST BLOOD GLUCOSE THREE TIMES DAILY   • amLODIPine (NORVASC) 10 MG tablet TAKE 1 TABLET BY MOUTH ONE TIME A DAY    • azelastine (ASTELIN) 0.1 % nasal spray 2 sprays into the nostril(s) as directed by provider 2 (Two) Times a Day. Use in each nostril as directed   • cetirizine (zyrTEC) 10 MG tablet TAKE 1 TABLET BY MOUTH ONE TIME A DAY    • clindamycin (CLEOCIN T) 1 % external solution clindamycin phosphate 1 % topical solution   APPLY A THIN LAYER TO THE AFFECTED AREA(S) BY TOPICAL ROUTE 2 TIMES PER DAY   • desonide (DESOWEN) 0.05 % cream desonide 0.05 % topical cream   APPLY TO AFFECTED AREA ON EYELIDS AND FACE BID X 2 WEEKS PRN FLARES   • fluconazole (DIFLUCAN) 150 MG tablet    • fluticasone (FLONASE) 50 MCG/ACT nasal spray INHALE 2 SPRAYS IN EACH NOSTRIL ONE TIME A DAY    • hydrocortisone 2.5 % cream APPLY A THIN LAYER TO THE AFFECTED AREA(S) ON THE FACE TOPICALLY TWICE DAILY FOR 2 WEEKS AS NEEDED FOR FLARES   • lisinopril-hydrochlorothiazide (PRINZIDE,ZESTORETIC) 10-12.5 MG per tablet Take 1 tablet by mouth Daily. 200001   • metFORMIN (GLUCOPHAGE) 500 MG tablet Take 2 tablets by mouth 2 (Two) Times a Day With Meals.   • montelukast (SINGULAIR) 10 MG tablet Take 1 tablet by mouth Every Night.   • tacrolimus (PROTOPIC) 0.1 % ointment Apply 1 application topically to the appropriate area as directed 2 (Two) Times a Day.   • tiZANidine (ZANAFLEX) 4 MG tablet Take 1 tablet by mouth At Night As Needed for Muscle Spasms.   • traMADol (ULTRAM) 50 MG tablet    • traZODone (DESYREL) 50 MG tablet Take 1 tablet by mouth Every Night.    • tretinoin (RETIN-A) 0.01 % gel tretinoin 0.01 % topical gel   APPLY TO THE AFFECTED AREA(S) ON FACE BY TOPICAL ROUTE ONCE DAILY AT BEDTIME   • [DISCONTINUED] doxycycline (VIBRAMYCIN) 100 MG capsule Take 100 mg by mouth every night at bedtime.     No current facility-administered medications on file prior to visit.        Review of Systems   Constitutional: Negative for appetite change, chills, diaphoresis, fatigue and fever.   HENT: Positive for congestion and sore throat. Negative for ear pain, postnasal drip, sinus pain, tinnitus, trouble swallowing and voice change.    Eyes: Negative for photophobia.   Respiratory: Negative.    Cardiovascular: Negative.    Gastrointestinal: Negative.  Negative for vomiting.   Genitourinary: Negative.    Musculoskeletal: Negative.    Skin: Negative.    Neurological: Positive for headaches. Negative for dizziness, tingling, tremors, seizures, syncope, facial asymmetry, speech difficulty, weakness, light-headedness, numbness and loss of balance.       Objective   Physical Exam   Constitutional: She is oriented to person, place, and time. Vital signs are normal. She appears well-developed and well-nourished. She is cooperative. She does not appear ill. No distress.   HENT:   Head: Normocephalic and atraumatic.   Right Ear: Tympanic membrane and external ear normal.   Left Ear: Tympanic membrane and external ear normal.   Nose: Nose normal.   Mouth/Throat: Uvula is midline, oropharynx is clear and moist and mucous membranes are normal.   Neck: Normal range of motion. Neck supple. No thyromegaly present.   Cardiovascular: Normal rate, regular rhythm and normal heart sounds.   Pulmonary/Chest: Effort normal and breath sounds normal.   Abdominal: Soft. She exhibits no distension. There is no tenderness.   Lymphadenopathy:     She has no cervical adenopathy.   Neurological: She is alert and oriented to person, place, and time.   Skin: Skin is warm, dry and intact. No rash noted. She  is not diaphoretic.   Psychiatric: She has a normal mood and affect. Her behavior is normal. Judgment and thought content normal.   Vitals reviewed.    Vitals:    03/03/20 1613   BP: 126/78   Pulse: 83   Resp: 18   Temp: 98.4 °F (36.9 °C)   SpO2: 99%     Body mass index is 39.06 kg/m².    Assessment/Plan   Teena was seen today for headache and follow up on medication.    Diagnoses and all orders for this visit:    Frequent headaches          -    Discontinue fenofibrate and Jardiance          -    May take OTC Tylenol or NSAIDS for pain    Type 2 diabetes mellitus without complication, without long-term current use of insulin (CMS/[facility])  -     sitaGLIPtin-metFORMIN (JANUMET)  MG per tablet; Take 1 tablet by mouth 2 (Two) Times a Day With Meals (samples given to patient)        -     Discussed low-carb, low-cholesterol diet    Exposure to the flu  -     POC Influenza A / B    Lab Results   Component Value Date    RAPFLUA Negative 03/04/2020    RAPFLUB Negative 03/04/2020     Sore throat  -     POC Rapid Strep A    Lab Results   Component Value Date    RAPSCRN Negative (A) 03/03/2020     Hyperkalemia  -     Basic Metabolic Panel     Discussed the nature of the medical condition(s) risks, complications, implications, management, safe and proper use of medications. Encouraged medication compliance, and keeping scheduled follow up appointments with me and any other providers.     F/U with PCP in 2 weeks.

## 2020-03-04 NOTE — TELEPHONE ENCOUNTER
PT CALLED AND STATED THAT SHE WAS GIVEN SAMPLES OF JANUMET - PT NEEDS TO KNOW HOW TO TAKE IT.  SHE CANNOT REMEMBER IF IS ONCE OR TWICE A DAY    PT CALL BACK [phone]

## 2020-03-05 NOTE — TELEPHONE ENCOUNTER
ATTEMPTED TO REACH OUT TO [facility]. NUMBER GIVEN WASN'T IN SERVICE. MEDICAL RECORDS HAVE NOT BEEN RECEIVED AT THIS TIME.

## 2020-04-01 DIAGNOSIS — E11.9 TYPE 2 DIABETES MELLITUS WITHOUT COMPLICATION, WITHOUT LONG-TERM CURRENT USE OF INSULIN (HCC): ICD-10-CM

## 2020-04-08 DIAGNOSIS — E11.9 TYPE 2 DIABETES MELLITUS WITHOUT COMPLICATION, WITHOUT LONG-TERM CURRENT USE OF INSULIN (HCC): ICD-10-CM

## 2020-04-08 NOTE — TELEPHONE ENCOUNTER
Patient called in for a refill on sitaGLIPtin-metFORMIN (Janumet)  MG per tablet but the pharmacy is stating they have not received it. Patient uses [facility] on [address].

## 2020-04-13 ENCOUNTER — TELEPHONE (OUTPATIENT)
Dept: FAMILY MEDICINE CLINIC | Facility: CLINIC | Age: 50
End: 2020-04-13

## 2020-04-13 NOTE — TELEPHONE ENCOUNTER
PT STATED THAT HER CO PAY FOR JANUMET IS $75.00   HER PHARMACY TOLD HER TO CALL THE DR AND SEE IF THEY HAVE A SAVINGS CARD FOR THIS MEDICATION OR ANYTHING ELSE SHE AN DO       PT CALL BACK [phone]

## 2020-04-13 NOTE — TELEPHONE ENCOUNTER
Contacted patient to let her know we do have a savings card. She states she will stop by 4/14/20 to

## 2020-04-14 DIAGNOSIS — G47.00 INSOMNIA, UNSPECIFIED TYPE: ICD-10-CM

## 2020-04-14 RX ORDER — TRAZODONE HYDROCHLORIDE 50 MG/1
50 TABLET ORAL NIGHTLY
Qty: 30 TABLET | Refills: 5 | Status: SHIPPED | OUTPATIENT
Start: 2020-04-14 | End: 2020-09-01 | Stop reason: SDUPTHER

## 2020-05-12 DIAGNOSIS — I10 ESSENTIAL HYPERTENSION: ICD-10-CM

## 2020-05-13 RX ORDER — LISINOPRIL AND HYDROCHLOROTHIAZIDE 12.5; 1 MG/1; MG/1
1 TABLET ORAL DAILY
Qty: 30 TABLET | Refills: 0 | Status: SHIPPED | OUTPATIENT
Start: 2020-05-13 | End: 2020-05-14 | Stop reason: SDUPTHER

## 2020-05-14 DIAGNOSIS — I10 ESSENTIAL HYPERTENSION: ICD-10-CM

## 2020-05-14 RX ORDER — LISINOPRIL AND HYDROCHLOROTHIAZIDE 12.5; 1 MG/1; MG/1
1 TABLET ORAL DAILY
Qty: 30 TABLET | Refills: 5 | Status: SHIPPED | OUTPATIENT
Start: 2020-05-14 | End: 2020-09-01 | Stop reason: SDUPTHER

## 2020-05-22 DIAGNOSIS — Z12.11 SCREENING FOR COLON CANCER: Primary | ICD-10-CM

## 2020-05-27 ENCOUNTER — APPOINTMENT (OUTPATIENT)
Dept: PREADMISSION TESTING | Facility: HOSPITAL | Age: 50
End: 2020-05-27

## 2020-05-27 LAB
REF LAB TEST METHOD: NORMAL
SARS-COV-2 RNA RESP QL NAA+PROBE: NOT DETECTED

## 2020-05-27 PROCEDURE — C9803 HOPD COVID-19 SPEC COLLECT: HCPCS

## 2020-05-27 PROCEDURE — U0004 COV-19 TEST NON-CDC HGH THRU: HCPCS

## 2020-05-27 PROCEDURE — U0002 COVID-19 LAB TEST NON-CDC: HCPCS

## 2020-05-29 ENCOUNTER — OUTSIDE FACILITY SERVICE (OUTPATIENT)
Dept: GASTROENTEROLOGY | Facility: CLINIC | Age: 50
End: 2020-05-29

## 2020-05-29 ENCOUNTER — LAB REQUISITION (OUTPATIENT)
Dept: LAB | Facility: HOSPITAL | Age: 50
End: 2020-05-29

## 2020-05-29 DIAGNOSIS — Z12.11 ENCOUNTER FOR SCREENING FOR MALIGNANT NEOPLASM OF COLON: ICD-10-CM

## 2020-05-29 PROCEDURE — 45385 COLONOSCOPY W/LESION REMOVAL: CPT | Performed by: INTERNAL MEDICINE

## 2020-05-29 PROCEDURE — 88305 TISSUE EXAM BY PATHOLOGIST: CPT | Performed by: INTERNAL MEDICINE

## 2020-06-01 LAB
CYTO UR: NORMAL
LAB AP CASE REPORT: NORMAL
LAB AP CLINICAL INFORMATION: NORMAL
PATH REPORT.FINAL DX SPEC: NORMAL
PATH REPORT.GROSS SPEC: NORMAL

## 2020-06-12 DIAGNOSIS — E11.9 TYPE 2 DIABETES MELLITUS WITHOUT COMPLICATION, WITHOUT LONG-TERM CURRENT USE OF INSULIN (HCC): ICD-10-CM

## 2020-06-29 DIAGNOSIS — I10 ESSENTIAL HYPERTENSION: ICD-10-CM

## 2020-06-29 RX ORDER — AMLODIPINE BESYLATE 10 MG/1
10 TABLET ORAL DAILY
Qty: 30 TABLET | Refills: 0 | Status: SHIPPED | OUTPATIENT
Start: 2020-06-29 | End: 2020-07-30

## 2020-07-01 DIAGNOSIS — I10 ESSENTIAL HYPERTENSION: ICD-10-CM

## 2020-07-01 RX ORDER — AMLODIPINE BESYLATE 10 MG/1
TABLET ORAL
Qty: 30 TABLET | Refills: 0 | OUTPATIENT
Start: 2020-07-01

## 2020-07-30 DIAGNOSIS — I10 ESSENTIAL HYPERTENSION: ICD-10-CM

## 2020-07-30 RX ORDER — AMLODIPINE BESYLATE 10 MG/1
TABLET ORAL
Qty: 30 TABLET | Refills: 0 | Status: SHIPPED | OUTPATIENT
Start: 2020-07-30 | End: 2020-09-01 | Stop reason: SDUPTHER

## 2020-08-12 DIAGNOSIS — E11.9 TYPE 2 DIABETES MELLITUS WITHOUT COMPLICATION, WITHOUT LONG-TERM CURRENT USE OF INSULIN (HCC): ICD-10-CM

## 2020-08-18 DIAGNOSIS — M62.838 MUSCLE SPASM: ICD-10-CM

## 2020-08-18 RX ORDER — TIZANIDINE 4 MG/1
4 TABLET ORAL NIGHTLY PRN
Qty: 30 TABLET | Refills: 0 | Status: SHIPPED | OUTPATIENT
Start: 2020-08-18 | End: 2020-09-01 | Stop reason: SDUPTHER

## 2020-08-30 DIAGNOSIS — I10 ESSENTIAL HYPERTENSION: ICD-10-CM

## 2020-08-31 RX ORDER — AMLODIPINE BESYLATE 10 MG/1
TABLET ORAL
Qty: 30 TABLET | Refills: 0 | OUTPATIENT
Start: 2020-08-31

## 2020-09-01 ENCOUNTER — OFFICE VISIT (OUTPATIENT)
Dept: FAMILY MEDICINE CLINIC | Facility: CLINIC | Age: 50
End: 2020-09-01

## 2020-09-01 VITALS
BODY MASS INDEX: 39.07 KG/M2 | WEIGHT: 199 LBS | DIASTOLIC BLOOD PRESSURE: 72 MMHG | OXYGEN SATURATION: 99 % | HEART RATE: 87 BPM | HEIGHT: 60 IN | TEMPERATURE: 98 F | SYSTOLIC BLOOD PRESSURE: 118 MMHG | RESPIRATION RATE: 18 BRPM

## 2020-09-01 DIAGNOSIS — E11.9 TYPE 2 DIABETES MELLITUS WITHOUT COMPLICATION, WITHOUT LONG-TERM CURRENT USE OF INSULIN (HCC): ICD-10-CM

## 2020-09-01 DIAGNOSIS — Z11.59 NEED FOR HEPATITIS C SCREENING TEST: ICD-10-CM

## 2020-09-01 DIAGNOSIS — G47.00 INSOMNIA, UNSPECIFIED TYPE: ICD-10-CM

## 2020-09-01 DIAGNOSIS — J30.9 ALLERGIC RHINITIS, UNSPECIFIED SEASONALITY, UNSPECIFIED TRIGGER: ICD-10-CM

## 2020-09-01 DIAGNOSIS — I10 ESSENTIAL HYPERTENSION: Primary | ICD-10-CM

## 2020-09-01 DIAGNOSIS — Z76.0 MEDICATION REFILL: ICD-10-CM

## 2020-09-01 DIAGNOSIS — M62.838 MUSCLE SPASM: ICD-10-CM

## 2020-09-01 DIAGNOSIS — E78.1 HYPERTRIGLYCERIDEMIA: ICD-10-CM

## 2020-09-01 LAB
EXPIRATION DATE: NORMAL
HBA1C MFR BLD: 5.9 %
Lab: NORMAL

## 2020-09-01 PROCEDURE — 83036 HEMOGLOBIN GLYCOSYLATED A1C: CPT | Performed by: NURSE PRACTITIONER

## 2020-09-01 PROCEDURE — 99214 OFFICE O/P EST MOD 30 MIN: CPT | Performed by: NURSE PRACTITIONER

## 2020-09-01 RX ORDER — TRAZODONE HYDROCHLORIDE 50 MG/1
50 TABLET ORAL NIGHTLY
Qty: 30 TABLET | Refills: 5 | Status: SHIPPED | OUTPATIENT
Start: 2020-09-01 | End: 2021-04-20

## 2020-09-01 RX ORDER — MONTELUKAST SODIUM 10 MG/1
10 TABLET ORAL NIGHTLY
Qty: 30 TABLET | Refills: 11 | Status: SHIPPED | OUTPATIENT
Start: 2020-09-01 | End: 2021-05-20 | Stop reason: SDUPTHER

## 2020-09-01 RX ORDER — FLUTICASONE PROPIONATE 50 MCG
2 SPRAY, SUSPENSION (ML) NASAL DAILY
Qty: 16 G | Refills: 11 | Status: SHIPPED | OUTPATIENT
Start: 2020-09-01 | End: 2022-02-16 | Stop reason: SDUPTHER

## 2020-09-01 RX ORDER — LISINOPRIL AND HYDROCHLOROTHIAZIDE 12.5; 1 MG/1; MG/1
1 TABLET ORAL DAILY
Qty: 30 TABLET | Refills: 11 | Status: SHIPPED | OUTPATIENT
Start: 2020-09-01 | End: 2020-11-17 | Stop reason: SDUPTHER

## 2020-09-01 RX ORDER — AMLODIPINE BESYLATE 10 MG/1
10 TABLET ORAL DAILY
Qty: 30 TABLET | Refills: 11 | Status: SHIPPED | OUTPATIENT
Start: 2020-09-01 | End: 2021-05-20 | Stop reason: SDUPTHER

## 2020-09-01 RX ORDER — CETIRIZINE HYDROCHLORIDE 10 MG/1
10 TABLET ORAL DAILY
Qty: 30 TABLET | Refills: 11 | Status: SHIPPED | OUTPATIENT
Start: 2020-09-01 | End: 2022-05-27 | Stop reason: ALTCHOICE

## 2020-09-01 RX ORDER — TIZANIDINE 4 MG/1
4 TABLET ORAL NIGHTLY PRN
Qty: 30 TABLET | Refills: 3 | Status: SHIPPED | OUTPATIENT
Start: 2020-09-01 | End: 2021-01-09 | Stop reason: SDUPTHER

## 2020-09-01 NOTE — PATIENT INSTRUCTIONS
"Hypertension, Adult  High blood pressure (hypertension) is when the force of blood pumping through the arteries is too strong. The arteries are the blood vessels that carry blood from the heart throughout the body. Hypertension forces the heart to work harder to pump blood and may cause arteries to become narrow or stiff. Untreated or uncontrolled hypertension can cause a heart attack, heart failure, a stroke, kidney disease, and other problems.  A blood pressure reading consists of a higher number over a lower number. Ideally, your blood pressure should be below 120/80. The first (\"top\") number is called the systolic pressure. It is a measure of the pressure in your arteries as your heart beats. The second (\"bottom\") number is called the diastolic pressure. It is a measure of the pressure in your arteries as the heart relaxes.  What are the causes?  The exact cause of this condition is not known. There are some conditions that result in or are related to high blood pressure.  What increases the risk?  Some risk factors for high blood pressure are under your control. The following factors may make you more likely to develop this condition:  · Smoking.  · Having type 2 diabetes mellitus, high cholesterol, or both.  · Not getting enough exercise or physical activity.  · Being overweight.  · Having too much fat, sugar, calories, or salt (sodium) in your diet.  · Drinking too much alcohol.  Some risk factors for high blood pressure may be difficult or impossible to change. Some of these factors include:  · Having chronic kidney disease.  · Having a family history of high blood pressure.  · Age. Risk increases with age.  · Race. You may be at higher risk if you are .  · Gender. Men are at higher risk than women before age 45. After age 65, women are at higher risk than men.  · Having obstructive sleep apnea.  · Stress.  What are the signs or symptoms?  High blood pressure may not cause symptoms. Very high " blood pressure (hypertensive crisis) may cause:  · Headache.  · Anxiety.  · Shortness of breath.  · Nosebleed.  · Nausea and vomiting.  · Vision changes.  · Severe chest pain.  · Seizures.  How is this diagnosed?  This condition is diagnosed by measuring your blood pressure while you are seated, with your arm resting on a flat surface, your legs uncrossed, and your feet flat on the floor. The cuff of the blood pressure monitor will be placed directly against the skin of your upper arm at the level of your heart. It should be measured at least twice using the same arm. Certain conditions can cause a difference in blood pressure between your right and left arms.  Certain factors can cause blood pressure readings to be lower or higher than normal for a short period of time:  · When your blood pressure is higher when you are in a health care provider's office than when you are at home, this is called white coat hypertension. Most people with this condition do not need medicines.  · When your blood pressure is higher at home than when you are in a health care provider's office, this is called masked hypertension. Most people with this condition may need medicines to control blood pressure.  If you have a high blood pressure reading during one visit or you have normal blood pressure with other risk factors, you may be asked to:  · Return on a different day to have your blood pressure checked again.  · Monitor your blood pressure at home for 1 week or longer.  If you are diagnosed with hypertension, you may have other blood or imaging tests to help your health care provider understand your overall risk for other conditions.  How is this treated?  This condition is treated by making healthy lifestyle changes, such as eating healthy foods, exercising more, and reducing your alcohol intake. Your health care provider may prescribe medicine if lifestyle changes are not enough to get your blood pressure under control, and  if:  · Your systolic blood pressure is above 130.  · Your diastolic blood pressure is above 80.  Your personal target blood pressure may vary depending on your medical conditions, your age, and other factors.  Follow these instructions at home:  Eating and drinking    · Eat a diet that is high in fiber and potassium, and low in sodium, added sugar, and fat. An example eating plan is called the DASH (Dietary Approaches to Stop Hypertension) diet. To eat this way:  ? Eat plenty of fresh fruits and vegetables. Try to fill one half of your plate at each meal with fruits and vegetables.  ? Eat whole grains, such as whole-wheat pasta, brown rice, or whole-grain bread. Fill about one fourth of your plate with whole grains.  ? Eat or drink low-fat dairy products, such as skim milk or low-fat yogurt.  ? Avoid fatty cuts of meat, processed or cured meats, and poultry with skin. Fill about one fourth of your plate with lean proteins, such as fish, chicken without skin, beans, eggs, or tofu.  ? Avoid pre-made and processed foods. These tend to be higher in sodium, added sugar, and fat.  · Reduce your daily sodium intake. Most people with hypertension should eat less than 1,500 mg of sodium a day.  · Do not drink alcohol if:  ? Your health care provider tells you not to drink.  ? You are pregnant, may be pregnant, or are planning to become pregnant.  · If you drink alcohol:  ? Limit how much you use to:  § 0-1 drink a day for women.  § 0-2 drinks a day for men.  ? Be aware of how much alcohol is in your drink. In the [place] one drink equals one 12 oz bottle of beer (355 mL), one 5 oz glass of wine (148 mL), or one 1½ oz glass of hard liquor (44 mL).  Lifestyle    · Work with your health care provider to maintain a healthy body weight or to lose weight. Ask what an ideal weight is for you.  · Get at least 30 minutes of exercise most days of the week. Activities may include walking, swimming, or biking.  · Include exercise to  strengthen your muscles (resistance exercise), such as Pilates or lifting weights, as part of your weekly exercise routine. Try to do these types of exercises for 30 minutes at least 3 days a week.  · Do not use any products that contain nicotine or tobacco, such as cigarettes, e-cigarettes, and chewing tobacco. If you need help quitting, ask your health care provider.  · Monitor your blood pressure at home as told by your health care provider.  · Keep all follow-up visits as told by your health care provider. This is important.  Medicines  · Take over-the-counter and prescription medicines only as told by your health care provider. Follow directions carefully. Blood pressure medicines must be taken as prescribed.  · Do not skip doses of blood pressure medicine. Doing this puts you at risk for problems and can make the medicine less effective.  · Ask your health care provider about side effects or reactions to medicines that you should watch for.  Contact a health care provider if you:  · Think you are having a reaction to a medicine you are taking.  · Have headaches that keep coming back (recurring).  · Feel dizzy.  · Have swelling in your ankles.  · Have trouble with your vision.  Get help right away if you:  · Develop a severe headache or confusion.  · Have unusual weakness or numbness.  · Feel faint.  · Have severe pain in your chest or abdomen.  · Vomit repeatedly.  · Have trouble breathing.  Summary  · Hypertension is when the force of blood pumping through your arteries is too strong. If this condition is not controlled, it may put you at risk for serious complications.  · Your personal target blood pressure may vary depending on your medical conditions, your age, and other factors. For most people, a normal blood pressure is less than 120/80.  · Hypertension is treated with lifestyle changes, medicines, or a combination of both. Lifestyle changes include losing weight, eating a healthy, low-sodium diet,  exercising more, and limiting alcohol.  This information is not intended to replace advice given to you by your health care provider. Make sure you discuss any questions you have with your health care provider.  Document Released: 12/18/2006 Document Revised: 08/28/2019 Document Reviewed: 08/28/2019  [employer] Patient Education © 2020 [employer]    Type 2 Diabetes Mellitus, Self Care, Adult  Caring for yourself after you have been diagnosed with type 2 diabetes (type 2 diabetes mellitus) means keeping your blood sugar (glucose) under control with a balance of:  · Nutrition.  · Exercise.  · Lifestyle changes.  · Medicines or insulin, if necessary.  · Support from your team of health care providers and others.  The following information explains what you need to know to manage your diabetes at home.  What are the risks?  Having diabetes can put you at risk for other long-term (chronic) conditions, such as heart disease and kidney disease. Your health care provider may prescribe medicines to help prevent complications from diabetes. These medicines may include:  · Aspirin.  · Medicine to lower cholesterol.  · Medicine to control blood pressure.  How to monitor blood glucose    · Check your blood glucose every day, as often as told by your health care provider.  · Have your A1c (hemoglobin A1c) level checked two or more times a year, or as often as told by your health care provider.  Your health care provider will set individualized treatment goals for you. Generally, the goal of treatment is to maintain the following blood glucose levels:  · Before meals (preprandial):  mg/dL (4.4-7.2 mmol/L).  · After meals (postprandial): below 180 mg/dL (10 mmol/L).  · A1c level: less than 7%.  How to manage hyperglycemia and hypoglycemia  Hyperglycemia symptoms  Hyperglycemia, also called high blood glucose, occurs when blood glucose is too high. Make sure you know the early signs of hyperglycemia, such as:  · Increased  thirst.  · Hunger.  · Feeling very tired.  · Needing to urinate more often than usual.  · Blurry vision.  Hypoglycemia symptoms  Hypoglycemia, also called low blood glucose, occurs with a blood glucose level at or below 70 mg/dL (3.9 mmol/L). The risk for hypoglycemia increases during or after exercise, during sleep, during illness, and when skipping meals or not eating for a long time (fasting).  It is important to know the symptoms of hypoglycemia and treat it right away. Always have a 15-gram rapid-acting carbohydrate snack with you to treat low blood glucose. Family members and close friends should also know the symptoms and should understand how to treat hypoglycemia, in case you are not able to treat yourself. Symptoms may include:  · Hunger.  · Anxiety.  · Sweating and feeling clammy.  · Confusion.  · Dizziness or feeling light-headed.  · Sleepiness.  · Nausea.  · Increased heart rate.  · Headache.  · Blurry vision.  · Irritability.  · A change in coordination.  · Tingling or numbness around the mouth, lips, or tongue.  · Restless sleep.  · Fainting.  · Seizure.  Treating hypoglycemia  If you are alert and able to swallow safely, follow the 15:15 rule:  · Take 15 grams of a rapid-acting carbohydrate. Talk with your health care provider about how much you should take.  · Rapid-acting options include:  ? Glucose pills (take 15 grams).  ? 6-8 pieces of hard candy.  ? 4-6 oz (120-150 mL) of fruit juice.  ? 4-6 oz (120-150 mL) of regular (not diet) soda.  ? 1 Tbsp (15 mL) honey or sugar.  · Check your blood glucose 15 minutes after you take the carbohydrate.  · If the repeat blood glucose level is still at or below 70 mg/dL (3.9 mmol/L), take 15 grams of a carbohydrate again.  · If your blood glucose level does not increase above 70 mg/dL (3.9 mmol/L) after 3 tries, seek emergency medical care.  · After your blood glucose level returns to normal, eat a meal or a snack within 1 hour.  Treating severe  hypoglycemia  Severe hypoglycemia is when your blood glucose level is at or below 54 mg/dL (3 mmol/L). Severe hypoglycemia is an emergency. Do not wait to see if the symptoms will go away. Get medical help right away. Call your local emergency services (911 in the [place]  If you have severe hypoglycemia and you cannot eat or drink, you may need an injection of glucagon. A family member or close friend should learn how to check your blood glucose and how to give you a glucagon injection. Ask your health care provider if you need to have an emergency glucagon injection kit available.  Severe hypoglycemia may need to be treated in a hospital. The treatment may include getting glucose through an IV. You may also need treatment for the cause of your hypoglycemia.  Follow these instructions at home:  Take diabetes medicines as told  · If your health care provider prescribed insulin or diabetes medicines, take them every day.  · Do not run out of insulin or other diabetes medicines that you take. Plan ahead so you always have these available.  · If you use insulin, adjust your dosage based on how physically active you are and what foods you eat. Your health care provider will tell you how to adjust your dosage.  Make healthy food choices    The things that you eat and drink affect your blood glucose and your insulin dosage. Making good choices helps to control your diabetes and prevent other health problems. A healthy meal plan includes eating lean proteins, complex carbohydrates, fresh fruits and vegetables, low-fat dairy products, and healthy fats.  Make an appointment to see a diet and nutrition specialist (registered dietitian) to help you create an eating plan that is right for you. Make sure that you:  · Follow instructions from your health care provider about eating or drinking restrictions.  · Drink enough fluid to keep your urine pale yellow.  · Keep a record of the carbohydrates that you eat. Do this by reading  food labels and learning the standard serving sizes of foods.  · Follow your sick day plan whenever you cannot eat or drink as usual. Make this plan in advance with your health care provider.    Stay active  Exercise regularly, as told by your health care provider. This may include:  · Stretching and doing strength exercises, such as yoga or weightlifting, 2 or more times a week.  · Doing 150 minutes or more of moderate-intensity or vigorous-intensity exercise each week. This could be brisk walking, biking, or water aerobics.  ? Spread out your activity over 3 or more days of the week.  ? Do not go more than 2 days in a row without doing some kind of physical activity.  When you start a new exercise or activity, work with your health care provider to adjust your insulin, medicines, or food intake as needed.  Make healthy lifestyle choices  · Do not use any tobacco products, such as cigarettes, chewing tobacco, and e-cigarettes. If you need help quitting, ask your health care provider.  · If your health care provider says that alcohol is safe for you, limit alcohol intake to no more than 1 drink per day for nonpregnant women and 2 drinks per day for men. One drink equals 12 oz of beer (355 mL), 5 oz of wine (148 mL), or 1½ oz of hard liquor (44 mL).  · Learn to manage stress. If you need help with this, ask your health care provider.  Care for your body    · Keep your immunizations up to date. In addition to getting vaccinations as told by your health care provider, it is recommended that you get vaccinated against the following illnesses:  ? The flu (influenza). Get a flu shot every year.  ? Pneumonia.  ? Hepatitis B.  · Schedule an eye exam soon after your diagnosis, and then one time every year after that.  · Check your skin and feet every day for cuts, bruises, redness, blisters, or sores. Schedule a foot exam with your health care provider once every year.  · Brush your teeth and gums two times a day, and floss  one or more times a day. Visit your dentist one or more times every 6 months.  · Maintain a healthy weight.  General instructions  · Take over-the-counter and prescription medicines only as told by your health care provider.  · Share your diabetes management plan with people in your workplace, school, and household.  · Carry a medical alert card or wear medical alert jewelry.  · Keep all follow-up visits as told by your health care provider. This is important.  Questions to ask your health care provider  · Do I need to meet with a diabetes educator?  · Where can I find a support group for people with diabetes?  Where to find more information  For more information about diabetes, visit:  · American Diabetes Association (ADA): www.diabetes.org  · American Association of Diabetes Educators (AADE): www.diabeteseducator.org  Summary  · Caring for yourself after you have been diagnosed with (type 2 diabetes mellitus) means keeping your blood sugar (glucose) under control with a balance of nutrition, exercise, lifestyle changes, and medicine.  · Check your blood glucose every day, as often as told by your health care provider.  · Having diabetes can put you at risk for other long-term (chronic) conditions, such as heart disease and kidney disease. Your health care provider may prescribe medicines to help prevent complications from diabetes.  · Keep all follow-up visits as told by your health care provider. This is important.  This information is not intended to replace advice given to you by your health care provider. Make sure you discuss any questions you have with your health care provider.  Document Released: 04/10/2017 Document Revised: 06/10/2019 Document Reviewed: 01/20/2017  [employer] Patient Education © 2020 [employer]    Dyslipidemia  Dyslipidemia is an imbalance of waxy, fat-like substances (lipids) in the blood. The body needs lipids in small amounts. Dyslipidemia often involves a high level of cholesterol or  triglycerides, which are types of lipids.  Common forms of dyslipidemia include:  · High levels of LDL cholesterol. LDL is the type of cholesterol that causes fatty deposits (plaques) to build up in the blood vessels that carry blood away from your heart (arteries).  · Low levels of HDL cholesterol. HDL cholesterol is the type of cholesterol that protects against heart disease. High levels of HDL remove the LDL buildup from arteries.  · High levels of triglycerides. Triglycerides are a fatty substance in the blood that is linked to a buildup of plaques in the arteries.  What are the causes?  Primary dyslipidemia is caused by changes (mutations) in genes that are passed down through families (inherited). These mutations cause several types of dyslipidemia.  Secondary dyslipidemia is caused by lifestyle choices and diseases that lead to dyslipidemia, such as:  · Eating a diet that is high in animal fat.  · Not getting enough exercise.  · Having diabetes, kidney disease, liver disease, or thyroid disease.  · Drinking large amounts of alcohol.  · Using certain medicines.  What increases the risk?  You are more likely to develop this condition if you are an older man or if you are a woman who has gone through menopause. Other risk factors include:  · Having a family history of dyslipidemia.  · Taking certain medicines, including birth control pills, steroids, some diuretics, and beta-blockers.  · Smoking cigarettes.  · Eating a high-fat diet.  · Having certain medical conditions such as diabetes, polycystic ovary syndrome (PCOS), kidney disease, liver disease, or hypothyroidism.  · Not exercising regularly.  · Being overweight or obese with too much belly fat.  What are the signs or symptoms?  In most cases, dyslipidemia does not usually cause any symptoms.  In severe cases, very high lipid levels can cause:  · Fatty bumps under the skin (xanthomas).  · White or gray ring around the black center (pupil) of the eye.  Very  high triglyceride levels can cause inflammation of the pancreas (pancreatitis).  How is this diagnosed?  Your health care provider may diagnose dyslipidemia based on a routine blood test (fasting blood test). Because most people do not have symptoms of the condition, this blood testing (lipid profile) is done on adults age 20 and older and is repeated every 5 years. This test checks:  · Total cholesterol. This measures the total amount of cholesterol in your blood, including LDL cholesterol, HDL cholesterol, and triglycerides. A healthy number is below 200.  · LDL cholesterol. The target number for LDL cholesterol is different for each person, depending on individual risk factors. Ask your health care provider what your LDL cholesterol should be.  · HDL cholesterol. An HDL level of 60 or higher is best because it helps to protect against heart disease. A number below 40 for men or below 50 for women increases the risk for heart disease.  · Triglycerides. A healthy triglyceride number is below 150.  If your lipid profile is abnormal, your health care provider may do other blood tests.  How is this treated?  Treatment depends on the type of dyslipidemia that you have and your other risk factors for heart disease and stroke. Your health care provider will have a target range for your lipid levels based on this information.  For many people, this condition may be treated by lifestyle changes, such as diet and exercise. Your health care provider may recommend that you:  · Get regular exercise.  · Make changes to your diet.  · Quit smoking if you smoke.  If diet changes and exercise do not help you reach your goals, your health care provider may also prescribe medicine to lower lipids. The most commonly prescribed type of medicine lowers your LDL cholesterol (statin drug). If you have a high triglyceride level, your provider may prescribe another type of drug (fibrate) or an omega-3 fish oil supplement, or both.  Follow  these instructions at home:    Eating and drinking  · Follow instructions from your health care provider or dietitian about eating or drinking restrictions.  · Eat a healthy diet as told by your health care provider. This can help you reach and maintain a healthy weight, lower your LDL cholesterol, and raise your HDL cholesterol. This may include:  ? Limiting your calories, if you are overweight.  ? Eating more fruits, vegetables, whole grains, fish, and lean meats.  ? Limiting saturated fat, trans fat, and cholesterol.  · If you drink alcohol:  ? Limit how much you use.  ? Be aware of how much alcohol is in your drink. In the [place] one drink equals one 12 oz bottle of beer (355 mL), one 5 oz glass of wine (148 mL), or one 1½ oz glass of hard liquor (44 mL).  · Do not drink alcohol if:  ? Your health care provider tells you not to drink.  ? You are pregnant, may be pregnant, or are planning to become pregnant.  Activity  · Get regular exercise. Start an exercise and strength training program as told by your health care provider. Ask your health care provider what activities are safe for you. Your health care provider may recommend:  ? 30 minutes of aerobic activity 4-6 days a week. Brisk walking is an example of aerobic activity.  ? Strength training 2 days a week.  General instructions  · Do not use any products that contain nicotine or tobacco, such as cigarettes, e-cigarettes, and chewing tobacco. If you need help quitting, ask your health care provider.  · Take over-the-counter and prescription medicines only as told by your health care provider. This includes supplements.  · Keep all follow-up visits as told by your health care provider.  Contact a health care provider if:  · You are:  ? Having trouble sticking to your exercise or diet plan.  ? Struggling to quit smoking or control your use of alcohol.  Summary  · Dyslipidemia often involves a high level of cholesterol or triglycerides, which are types of  lipids.  · Treatment depends on the type of dyslipidemia that you have and your other risk factors for heart disease and stroke.  · For many people, treatment starts with lifestyle changes, such as diet and exercise.  · Your health care provider may prescribe medicine to lower lipids.  This information is not intended to replace advice given to you by your health care provider. Make sure you discuss any questions you have with your health care provider.  Document Released: 12/23/2014 Document Revised: 08/12/2019 Document Reviewed: 07/19/2019  [employer] Patient Education © 2020 [employer]    Insomnia  Insomnia is a sleep disorder that makes it difficult to fall asleep or stay asleep. Insomnia can cause fatigue, low energy, difficulty concentrating, mood swings, and poor performance at work or school.  There are three different ways to classify insomnia:  · Difficulty falling asleep.  · Difficulty staying asleep.  · Waking up too early in the morning.  Any type of insomnia can be long-term (chronic) or short-term (acute). Both are common. Short-term insomnia usually lasts for three months or less. Chronic insomnia occurs at least three times a week for longer than three months.  What are the causes?  Insomnia may be caused by another condition, situation, or substance, such as:  · Anxiety.  · Certain medicines.  · Gastroesophageal reflux disease (GERD) or other gastrointestinal conditions.  · Asthma or other breathing conditions.  · Restless legs syndrome, sleep apnea, or other sleep disorders.  · Chronic pain.  · Menopause.  · Stroke.  · Abuse of alcohol, tobacco, or illegal drugs.  · Mental health conditions, such as depression.  · Caffeine.  · Neurological disorders, such as Alzheimer's disease.  · An overactive thyroid (hyperthyroidism).  Sometimes, the cause of insomnia may not be known.  What increases the risk?  Risk factors for insomnia include:  · Gender. Women are affected more often than men.  · Age.  Insomnia is more common as you get older.  · Stress.  · Lack of exercise.  · Irregular work schedule or working night shifts.  · Traveling between different time zones.  · Certain medical and mental health conditions.  What are the signs or symptoms?  If you have insomnia, the main symptom is having trouble falling asleep or having trouble staying asleep. This may lead to other symptoms, such as:  · Feeling fatigued or having low energy.  · Feeling nervous about going to sleep.  · Not feeling rested in the morning.  · Having trouble concentrating.  · Feeling irritable, anxious, or depressed.  How is this diagnosed?  This condition may be diagnosed based on:  · Your symptoms and medical history. Your health care provider may ask about:  ? Your sleep habits.  ? Any medical conditions you have.  ? Your mental health.  · A physical exam.  How is this treated?  Treatment for insomnia depends on the cause. Treatment may focus on treating an underlying condition that is causing insomnia. Treatment may also include:  · Medicines to help you sleep.  · Counseling or therapy.  · Lifestyle adjustments to help you sleep better.  Follow these instructions at home:  Eating and drinking    · Limit or avoid alcohol, caffeinated beverages, and cigarettes, especially close to bedtime. These can disrupt your sleep.  · Do not eat a large meal or eat spicy foods right before bedtime. This can lead to digestive discomfort that can make it hard for you to sleep.  Sleep habits    · Keep a sleep diary to help you and your health care provider figure out what could be causing your insomnia. Write down:  ? When you sleep.  ? When you wake up during the night.  ? How well you sleep.  ? How rested you feel the next day.  ? Any side effects of medicines you are taking.  ? What you eat and drink.  · Make your bedroom a dark, comfortable place where it is easy to fall asleep.  ? Put up shades or blackout curtains to block light from outside.  ? Use  a white noise machine to block noise.  ? Keep the temperature cool.  · Limit screen use before bedtime. This includes:  ? Watching TV.  ? Using your smartphone, tablet, or computer.  · Stick to a routine that includes going to bed and waking up at the same times every day and night. This can help you fall asleep faster. Consider making a quiet activity, such as reading, part of your nighttime routine.  · Try to avoid taking naps during the day so that you sleep better at night.  · Get out of bed if you are still awake after 15 minutes of trying to sleep. Keep the lights down, but try reading or doing a quiet activity. When you feel sleepy, go back to bed.  General instructions  · Take over-the-counter and prescription medicines only as told by your health care provider.  · Exercise regularly, as told by your health care provider. Avoid exercise starting several hours before bedtime.  · Use relaxation techniques to manage stress. Ask your health care provider to suggest some techniques that may work well for you. These may include:  ? Breathing exercises.  ? Routines to release muscle tension.  ? Visualizing peaceful scenes.  · Make sure that you drive carefully. Avoid driving if you feel very sleepy.  · Keep all follow-up visits as told by your health care provider. This is important.  Contact a health care provider if:  · You are tired throughout the day.  · You have trouble in your daily routine due to sleepiness.  · You continue to have sleep problems, or your sleep problems get worse.  Get help right away if:  · You have serious thoughts about hurting yourself or someone else.  If you ever feel like you may hurt yourself or others, or have thoughts about taking your own life, get help right away. You can go to your nearest emergency department or call:  · Your local emergency services (911 in the [place]  · A suicide crisis helpline, such as the National Suicide Prevention Lifeline at 1-453.769.5314. This is open  24 hours a day.  Summary  · Insomnia is a sleep disorder that makes it difficult to fall asleep or stay asleep.  · Insomnia can be long-term (chronic) or short-term (acute).  · Treatment for insomnia depends on the cause. Treatment may focus on treating an underlying condition that is causing insomnia.  · Keep a sleep diary to help you and your health care provider figure out what could be causing your insomnia.  This information is not intended to replace advice given to you by your health care provider. Make sure you discuss any questions you have with your health care provider.  Document Released: 12/15/2001 Document Revised: 11/30/2018 Document Reviewed: 09/27/2018  [employer] Patient Education © 2020 [employer]    Leg Cramps  Leg cramps occur when one or more muscles tighten and you have no control over this tightening (involuntary muscle contraction). Muscle cramps can develop in any muscle, but the most common place is in the calf muscles of the leg. Those cramps can occur during exercise or when you are at rest. Leg cramps are painful, and they may last for a few seconds to a few minutes. Cramps may return several times before they finally stop.  Usually, leg cramps are not caused by a serious medical problem. In many cases, the cause is not known. Some common causes include:  · Excessive physical effort (overexertion), such as during intense exercise.  · Overuse from repetitive motions, or doing the same thing over and over.  · Staying in a certain position for a long period of time.  · Improper preparation, form, or technique while performing a sport or an activity.  · Dehydration.  · Injury.  · Side effects of certain medicines.  · Abnormally low levels of minerals in your blood (electrolytes), especially potassium and calcium. This could result from:  ? Pregnancy.  ? Taking diuretic medicines.  Follow these instructions at home:  Eating and drinking  · Drink enough fluid to keep your urine pale yellow.  Staying hydrated may help prevent cramps.  · Eat a healthy diet that includes plenty of nutrients to help your muscles function. A healthy diet includes fruits and vegetables, lean protein, whole grains, and low-fat or nonfat dairy products.  Managing pain, stiffness, and swelling         · Try massaging, stretching, and relaxing the affected muscle. Do this for several minutes at a time.  · If directed, put ice on areas that are sore or painful after a cramp:  ? Put ice in a plastic bag.  ? Place a towel between your skin and the bag.  ? Leave the ice on for 20 minutes, 2-3 times a day.  · If directed, apply heat to muscles that are tense or tight. Do this before you exercise, or as often as told by your health care provider. Use the heat source that your health care provider recommends, such as a moist heat pack or a heating pad.  ? Place a towel between your skin and the heat source.  ? Leave the heat on for 20-30 minutes.  ? Remove the heat if your skin turns bright red. This is especially important if you are unable to feel pain, heat, or cold. You may have a greater risk of getting burned.  · Try taking hot showers or baths to help relax tight muscles.  General instructions  · If you are having frequent leg cramps, avoid intense exercise for several days.  · Take over-the-counter and prescription medicines only as told by your health care provider.  · Keep all follow-up visits as told by your health care provider. This is important.  Contact a health care provider if:  · Your leg cramps get more severe or more frequent, or they do not improve over time.  · Your foot becomes cold, numb, or blue.  Summary  · Muscle cramps can develop in any muscle, but the most common place is in the calf muscles of the leg.  · Leg cramps are painful, and they may last for a few seconds to a few minutes.  · Usually, leg cramps are not caused by a serious medical problem. Often, the cause is not known.  · Stay hydrated and take  over-the-counter and prescription medicines only as told by your health care provider.  This information is not intended to replace advice given to you by your health care provider. Make sure you discuss any questions you have with your health care provider.  Document Released: 01/25/2006 Document Revised: 11/30/2018 Document Reviewed: 09/27/2018  [employer] Patient Education © 2020 [employer]    Muscle Cramps and Spasms  Muscle cramps and spasms occur when a muscle or muscles tighten and you have no control over this tightening (involuntary muscle contraction). They are a common problem and can develop in any muscle. The most common place is in the calf muscles of the leg. Muscle cramps and muscle spasms are both involuntary muscle contractions, but there are some differences between the two:  · Muscle cramps are painful. They come and go and may last for a few seconds or up to 15 minutes. Muscle cramps are often more forceful and last longer than muscle spasms.  · Muscle spasms may or may not be painful. They may also last just a few seconds or much longer.  Certain medical conditions, such as diabetes or Parkinson's disease, can make it more likely to develop cramps or spasms. However, cramps or spasms are usually not caused by a serious underlying problem. Common causes include:  · Doing more physical work or exercise than your body is ready for (overexertion).  · Overuse from repeating certain movements too many times.  · Remaining in a certain position for a long period of time.  · Improper preparation, form, or technique while playing a sport or doing an activity.  · Dehydration.  · Injury.  · Side effects of some medicines.  · Abnormally low levels of the salts and minerals in your blood (electrolytes), especially potassium and calcium. This could happen if you are taking water pills (diuretics) or if you are pregnant.  In many cases, the cause of muscle cramps or spasms is not known.  Follow these  instructions at home:  Managing pain and stiffness         · Try massaging, stretching, and relaxing the affected muscle. Do this for several minutes at a time.  · If directed, apply heat to tight or tense muscles as often as told by your health care provider. Use the heat source that your health care provider recommends, such as a moist heat pack or a heating pad.  ? Place a towel between your skin and the heat source.  ? Leave the heat on for 20-30 minutes.  ? Remove the heat if your skin turns bright red. This is especially important if you are unable to feel pain, heat, or cold. You may have a greater risk of getting burned.  · If directed, put ice on the affected area. This may help if you are sore or have pain after a cramp or spasm.  ? Put ice in a plastic bag.  ? Place a towel between your skin and the bag.  ? Leave the ice on for 20 minutes, 2-3 times a day.  · Try taking hot showers or baths to help relax tight muscles.  Eating and drinking  · Drink enough fluid to keep your urine pale yellow. Staying well hydrated may help prevent cramps or spasms.  · Eat a healthy diet that includes plenty of nutrients to help your muscles function. A healthy diet includes fruits and vegetables, lean protein, whole grains, and low-fat or nonfat dairy products.  General instructions  · If you are having frequent cramps, avoid intense exercise for several days.  · Take over-the-counter and prescription medicines only as told by your health care provider.  · Pay attention to any changes in your symptoms.  · Keep all follow-up visits as told by your health care provider. This is important.  Contact a health care provider if:  · Your cramps or spasms get more severe or happen more often.  · Your cramps or spasms do not improve over time.  Summary  · Muscle cramps and spasms occur when a muscle or muscles tighten and you have no control over this tightening (involuntary muscle contraction).  · The most common place for cramps  or spasms to occur is in the calf muscles of the leg.  · Massaging, stretching, and relaxing the affected muscle may relieve the cramp or spasm.  · Drink enough fluid to keep your urine pale yellow. Staying well hydrated may help prevent cramps or spasms.  This information is not intended to replace advice given to you by your health care provider. Make sure you discuss any questions you have with your health care provider.  Document Released: 06/09/2003 Document Revised: 05/13/2019 Document Reviewed: 05/13/2019  [employer] Patient Education © 2020 [employer]    Allergies, Adult  An allergy is when your body's defense system (immune system) overreacts to an otherwise harmless substance (allergen) that you breathe in or eat or something that touches your skin. When you come into contact with something that you are allergic to, your immune system produces certain proteins (antibodies). These proteins cause cells to release chemicals (histamines) that trigger the symptoms of an allergic reaction.  Allergies often affect the nasal passages (allergic rhinitis), eyes (allergic conjunctivitis), skin (atopic dermatitis), and stomach. Allergies can be mild or severe. Allergies cannot spread from person to person (are not contagious). They can develop at any age and may be outgrown.  What increases the risk?  You may be at greater risk of allergies if other people in your family have allergies.  What are the signs or symptoms?  Symptoms depend on what type of allergy you have. They may include:  · Runny, stuffy nose.  · Sneezing.  · Itchy mouth, ears, or throat.  · Postnasal drip.  · Sore throat.  · Itchy, red, watery, or puffy eyes.  · Skin rash or hives.  · Stomach pain.  · Vomiting.  · Diarrhea.  · Bloating.  · Wheezing or coughing.  People with a severe allergy to food, medicine, or an insect bite may have a life-threatening allergic reaction (anaphylaxis). Symptoms of anaphylaxis  include:  · Hives.  · Itching.  · Flushed face.  · Swollen lips, tongue, or mouth.  · Tight or swollen throat.  · Chest pain or tightness in the chest.  · Trouble breathing or shortness of breath.  · Rapid heartbeat.  · Dizziness or fainting.  · Vomiting.  · Diarrhea.  · Pain in the abdomen.  How is this diagnosed?  This condition is diagnosed based on:  · Your symptoms.  · Your family and medical history.  · A physical exam.  You may need to see a health care provider who specializes in treating allergies (allergist). You may also have tests, including:  · Skin tests to see which allergens are causing your symptoms, such as:  ? Skin prick test. In this test, your skin is pricked with a tiny needle and exposed to small amounts of possible allergens to see if your skin reacts.  ? Intradermal skin test. In this test, a small amount of allergen is injected under your skin to see if your skin reacts.  ? Patch test. In this test, a small amount of allergen is placed on your skin and then your skin is covered with a bandage. Your health care provider will check your skin after a couple of days to see if a rash has developed.  · Blood tests.  · Challenges tests. In this test, you inhale a small amount of allergen by mouth to see if you have an allergic reaction.  You may also be asked to:  · Keep a food diary. A food diary is a record of all the foods and drinks you have in a day and any symptoms you experience.  · Practice an elimination diet. An elimination diet involves eliminating specific foods from your diet and then adding them back in one by one to find out if a certain food causes an allergic reaction.  How is this treated?  Treatment for allergies depends on your symptoms. Treatment may include:  · Cold compresses to soothe itching and swelling.  · Eye drops.  · Nasal sprays.  · Using a saline spray or container (neti pot) to flush out the nose (nasal irrigation). These methods can help clear away mucus and keep  the nasal passages moist.  · Using a humidifier.  · Oral antihistamines or other medicines to block allergic reaction and inflammation.  · Skin creams to treat rashes or itching.  · Diet changes to eliminate food allergy triggers.  · Repeated exposure to tiny amounts of allergens to build up a tolerance and prevent future allergic reactions (immunotherapy). These include:  ? Allergy shots.  ? Oral treatment. This involves taking small doses of an allergen under the tongue (sublingual immunotherapy).  · Emergency epinephrine injection (auto-injector) in case of an allergic emergency. This is a self-injectable, pre-measured medicine that must be given within the first few minutes of a serious allergic reaction.  Follow these instructions at home:              · Avoid known allergens whenever possible.  · If you suffer from airborne allergens, wash out your nose daily. You can do this with a saline spray or a neti pot to flush out your nose (nasal irrigation).  · Take over-the-counter and prescription medicines only as told by your health care provider.  · Keep all follow-up visits as told by your health care provider. This is important.  · If you are at risk of a severe allergic reaction (anaphylaxis), keep your auto-injector with you at all times.  · If you have ever had anaphylaxis, wear a medical alert bracelet or necklace that states you have a severe allergy.  Contact a health care provider if:  · Your symptoms do not improve with treatment.  Get help right away if:  · You have symptoms of anaphylaxis, such as:  ? Swollen mouth, tongue, or throat.  ? Pain or tightness in your chest.  ? Trouble breathing or shortness of breath.  ? Dizziness or fainting.  ? Severe abdominal pain, vomiting, or diarrhea.  This information is not intended to replace advice given to you by your health care provider. Make sure you discuss any questions you have with your health care provider.  Document Released: 03/12/2004 Document  Revised: 03/13/2019 Document Reviewed: 07/05/2017  [employer] Patient Education © 2020 [employer]

## 2020-09-01 NOTE — PROGRESS NOTES
Subjective   Teena Jacobson is a 50 y.o. female.     Ms. Jacobson presents today for refills on her routine medications. Patient has a history of HTN and DM2 which have been stable and well-controlled on current medications. She also has a history of HLD which has also been well-controlled. Patient is not fasting today but is amenable to return for fasting labs.     Hypertension   This is a chronic problem. The current episode started 1 to 4 weeks ago. The problem has been gradually improving since onset. Associated symptoms include malaise/fatigue. Pertinent negatives include no anxiety, blurred vision, chest pain, headaches, neck pain, orthopnea, palpitations, peripheral edema, PND, shortness of breath or sweats. There are no associated agents to hypertension. Risk factors for coronary artery disease include diabetes mellitus. Past treatments include ACE inhibitors, diuretics and calcium channel blockers. Current antihypertension treatment includes ACE inhibitors, calcium channel blockers and diuretics. The current treatment provides significant improvement. There are no compliance problems.        The following portions of the patient's history were reviewed and updated as appropriate: allergies, current medications, past family history, past medical history, past social history, past surgical history and problem list.    Allergies as of 09/01/2020   • (No Known Allergies)       Current Outpatient Medications on File Prior to Visit   Medication Sig   • ACCU-CHEK FASTCLIX LANCETS misc USE TO TEST BLOOD GLUCOSE THREE TIMES DAILY   • clindamycin (CLEOCIN T) 1 % external solution clindamycin phosphate 1 % topical solution   APPLY A THIN LAYER TO THE AFFECTED AREA(S) BY TOPICAL ROUTE 2 TIMES PER DAY   • desonide (DESOWEN) 0.05 % cream desonide 0.05 % topical cream   APPLY TO AFFECTED AREA ON EYELIDS AND FACE BID X 2 WEEKS PRN FLARES   • hydrocortisone 2.5 % cream APPLY A THIN LAYER TO THE AFFECTED AREA(S) ON  THE FACE TOPICALLY TWICE DAILY FOR 2 WEEKS AS NEEDED FOR FLARES   • tacrolimus (PROTOPIC) 0.1 % ointment Apply 1 application topically to the appropriate area as directed 2 (Two) Times a Day.   • tretinoin (RETIN-A) 0.01 % gel tretinoin 0.01 % topical gel   APPLY TO THE AFFECTED AREA(S) ON FACE BY TOPICAL ROUTE ONCE DAILY AT BEDTIME     No current facility-administered medications on file prior to visit.        Review of Systems   Constitutional: Positive for fatigue and malaise/fatigue. Negative for activity change, appetite change, chills, diaphoresis and fever.   HENT: Negative.    Eyes: Negative for blurred vision.   Respiratory: Negative.  Negative for shortness of breath.    Cardiovascular: Negative.  Negative for chest pain, palpitations, orthopnea, leg swelling and PND.   Gastrointestinal: Negative.    Musculoskeletal: Positive for myalgias. Negative for neck pain.   Allergic/Immunologic: Positive for environmental allergies.   Neurological: Negative for headaches.   Psychiatric/Behavioral:        Anxiety, depression and insomnia stable and controlled on current medications       Objective   Physical Exam   Constitutional: She is oriented to person, place, and time. Vital signs are normal. She appears well-developed and well-nourished. She is cooperative. She does not appear ill. No distress.   Neck: Normal range of motion. Neck supple. No thyromegaly present.   Cardiovascular: Normal rate, regular rhythm and normal heart sounds.   Pulmonary/Chest: Effort normal and breath sounds normal.   Abdominal: Soft. She exhibits no distension. There is no tenderness.   Lymphadenopathy:     She has no cervical adenopathy.   Neurological: She is alert and oriented to person, place, and time.   Skin: Skin is warm, dry and intact. No rash noted. She is not diaphoretic.   Psychiatric: She has a normal mood and affect. Her behavior is normal. Judgment and thought content normal.   Vitals reviewed.    Vitals:    09/01/20  1425   BP: 118/72   Pulse: 87   Resp: 18   Temp: 98 °F (36.7 °C)   SpO2: 99%     Body mass index is 38.86 kg/m².    Assessment/Plan   Teena was seen today for med refill.    Diagnoses and all orders for this visit:    Essential hypertension  -     amLODIPine (NORVASC) 10 MG tablet; Take 1 tablet by mouth Daily.  -     lisinopril-hydrochlorothiazide (PRINZIDE,ZESTORETIC) 10-12.5 MG per tablet; Take 1 tablet by mouth Daily. 200001  -     Comprehensive Metabolic Panel; Future  -     TSH; Future  -     CBC Auto Differential; Future    Type 2 diabetes mellitus without complication, without long-term current use of insulin (CMS/[facility])  -     POC Glycosylated Hemoglobin (Hb A1C)  -     sitaGLIPtin-metFORMIN (Janumet)  MG per tablet; Take 1 tablet by mouth 2 (Two) Times a Day With Meals.  -     Comprehensive Metabolic Panel; Future        *     POC HbA1c 5.9    Insomnia, unspecified type  -     traZODone (DESYREL) 50 MG tablet; Take 1 tablet by mouth Every Night.    Allergic rhinitis, unspecified seasonality, unspecified trigger  -     montelukast (Singulair) 10 MG tablet; Take 1 tablet by mouth Every Night.  -     fluticasone (FLONASE) 50 MCG/ACT nasal spray; 2 sprays by Each Nare route Daily.  -     cetirizine (zyrTEC) 10 MG tablet; Take 1 tablet by mouth Daily.    Muscle spasm  -     tiZANidine (ZANAFLEX) 4 MG tablet; Take 1 tablet by mouth At Night As Needed for Muscle Spasms.    Hypertriglyceridemia  -     Lipid Panel; Future    Need for hepatitis C screening test  -     Hepatitis C Antibody; Future    Medication refill  -     amLODIPine (NORVASC) 10 MG tablet; Take 1 tablet by mouth Daily.  -     lisinopril-hydrochlorothiazide (PRINZIDE,ZESTORETIC) 10-12.5 MG per tablet; Take 1 tablet by mouth Daily. 200001  -     traZODone (DESYREL) 50 MG tablet; Take 1 tablet by mouth Every Night.  -     montelukast (Singulair) 10 MG tablet; Take 1 tablet by mouth Every Night.  -     tiZANidine (ZANAFLEX) 4 MG tablet; Take 1  tablet by mouth At Night As Needed for Muscle Spasms.  -     fluticasone (FLONASE) 50 MCG/ACT nasal spray; 2 sprays by Each Nare route Daily.  -     cetirizine (zyrTEC) 10 MG tablet; Take 1 tablet by mouth Daily.  -     sitaGLIPtin-metFORMIN (Janumet)  MG per tablet; Take 1 tablet by mouth 2 (Two) Times a Day With Meals.       Discussed the nature of the medical condition(s) risks, complications, implications, management, safe and proper use of medications. Encouraged medication compliance, and keeping scheduled follow up appointments with me and any other providers.

## 2020-09-14 ENCOUNTER — LAB (OUTPATIENT)
Dept: FAMILY MEDICINE CLINIC | Facility: CLINIC | Age: 50
End: 2020-09-14

## 2020-09-14 DIAGNOSIS — Z11.59 NEED FOR HEPATITIS C SCREENING TEST: ICD-10-CM

## 2020-09-14 DIAGNOSIS — E11.9 TYPE 2 DIABETES MELLITUS WITHOUT COMPLICATION, WITHOUT LONG-TERM CURRENT USE OF INSULIN (HCC): ICD-10-CM

## 2020-09-14 DIAGNOSIS — E78.1 HYPERTRIGLYCERIDEMIA: ICD-10-CM

## 2020-09-14 DIAGNOSIS — I10 ESSENTIAL HYPERTENSION: ICD-10-CM

## 2020-09-15 LAB
ALBUMIN SERPL-MCNC: 4.8 G/DL (ref 3.5–5.2)
ALBUMIN/GLOB SERPL: 2.3 G/DL
ALP SERPL-CCNC: 92 U/L (ref 39–117)
ALT SERPL-CCNC: 14 U/L (ref 1–33)
AST SERPL-CCNC: 14 U/L (ref 1–32)
BASOPHILS # BLD AUTO: 0.05 10*3/MM3 (ref 0–0.2)
BASOPHILS NFR BLD AUTO: 0.7 % (ref 0–1.5)
BILIRUB SERPL-MCNC: 0.3 MG/DL (ref 0–1.2)
BUN SERPL-MCNC: 17 MG/DL (ref 6–20)
BUN/CREAT SERPL: 17.7 (ref 7–25)
CALCIUM SERPL-MCNC: 9.4 MG/DL (ref 8.6–10.5)
CHLORIDE SERPL-SCNC: 101 MMOL/L (ref 98–107)
CHOLEST SERPL-MCNC: 149 MG/DL (ref 0–200)
CO2 SERPL-SCNC: 23.1 MMOL/L (ref 22–29)
CREAT SERPL-MCNC: 0.96 MG/DL (ref 0.57–1)
EOSINOPHIL # BLD AUTO: 0.12 10*3/MM3 (ref 0–0.4)
EOSINOPHIL NFR BLD AUTO: 1.7 % (ref 0.3–6.2)
ERYTHROCYTE [DISTWIDTH] IN BLOOD BY AUTOMATED COUNT: 13.8 % (ref 12.3–15.4)
GLOBULIN SER CALC-MCNC: 2.1 GM/DL
GLUCOSE SERPL-MCNC: 102 MG/DL (ref 65–99)
HCT VFR BLD AUTO: 37.8 % (ref 34–46.6)
HCV AB S/CO SERPL IA: <0.1 S/CO RATIO (ref 0–0.9)
HDLC SERPL-MCNC: 50 MG/DL (ref 40–60)
HGB BLD-MCNC: 12.3 G/DL (ref 12–15.9)
IMM GRANULOCYTES # BLD AUTO: 0.01 10*3/MM3 (ref 0–0.05)
IMM GRANULOCYTES NFR BLD AUTO: 0.1 % (ref 0–0.5)
LDLC SERPL CALC-MCNC: 71 MG/DL (ref 0–100)
LYMPHOCYTES # BLD AUTO: 2.62 10*3/MM3 (ref 0.7–3.1)
LYMPHOCYTES NFR BLD AUTO: 37.5 % (ref 19.6–45.3)
MCH RBC QN AUTO: 27.8 PG (ref 26.6–33)
MCHC RBC AUTO-ENTMCNC: 32.5 G/DL (ref 31.5–35.7)
MCV RBC AUTO: 85.3 FL (ref 79–97)
MONOCYTES # BLD AUTO: 0.5 10*3/MM3 (ref 0.1–0.9)
MONOCYTES NFR BLD AUTO: 7.2 % (ref 5–12)
NEUTROPHILS # BLD AUTO: 3.68 10*3/MM3 (ref 1.7–7)
NEUTROPHILS NFR BLD AUTO: 52.8 % (ref 42.7–76)
NRBC BLD AUTO-RTO: 0 /100 WBC (ref 0–0.2)
PLATELET # BLD AUTO: 308 10*3/MM3 (ref 140–450)
POTASSIUM SERPL-SCNC: 4.7 MMOL/L (ref 3.5–5.2)
PROT SERPL-MCNC: 6.9 G/DL (ref 6–8.5)
RBC # BLD AUTO: 4.43 10*6/MM3 (ref 3.77–5.28)
SODIUM SERPL-SCNC: 139 MMOL/L (ref 136–145)
TRIGL SERPL-MCNC: 138 MG/DL (ref 0–150)
TSH SERPL DL<=0.005 MIU/L-ACNC: 2.03 UIU/ML (ref 0.27–4.2)
VLDLC SERPL CALC-MCNC: 27.6 MG/DL
WBC # BLD AUTO: 6.98 10*3/MM3 (ref 3.4–10.8)

## 2020-10-12 DIAGNOSIS — Z76.0 MEDICATION REFILL: ICD-10-CM

## 2020-10-12 DIAGNOSIS — E11.9 TYPE 2 DIABETES MELLITUS WITHOUT COMPLICATION, WITHOUT LONG-TERM CURRENT USE OF INSULIN (HCC): ICD-10-CM

## 2020-10-14 RX ORDER — SITAGLIPTIN AND METFORMIN HYDROCHLORIDE 500; 50 MG/1; MG/1
1 TABLET, FILM COATED ORAL 2 TIMES DAILY WITH MEALS
Qty: 60 TABLET | Refills: 3 | Status: SHIPPED | OUTPATIENT
Start: 2020-10-14 | End: 2021-02-11

## 2020-10-14 NOTE — TELEPHONE ENCOUNTER
PT CALLED STATED THAT PHARMACY HAS NOT RECEIVED RX  sitaGLIPtin-metFORMIN (Janumet)  MG per tablet.    PLEASE ADVISE.  CALL BACK:[phone]      [id number] - [place], [place] - [address] [place] [address] [address]

## 2020-11-17 DIAGNOSIS — I10 ESSENTIAL HYPERTENSION: ICD-10-CM

## 2020-11-17 DIAGNOSIS — Z76.0 MEDICATION REFILL: ICD-10-CM

## 2020-11-18 RX ORDER — LISINOPRIL AND HYDROCHLOROTHIAZIDE 12.5; 1 MG/1; MG/1
1 TABLET ORAL DAILY
Qty: 30 TABLET | Refills: 11 | OUTPATIENT
Start: 2020-11-18

## 2020-11-18 NOTE — TELEPHONE ENCOUNTER
PATIENT CALLED AND STATED HER PHARMACY SENT A REQUEST LAST WEEK FOR HER MED REFILL BUT NEVER HEARD ANYTHING BACK.    PATIENT CALLED TODAY 11/18/20 TO INFORM US THAT SHE DID A MED REFILL ON MYCHART YESTERDAY 11/17/20    PATIENT ONLY HAS 1 PILL LEFT AND NEEDS THIS MEDICATION FILLED ASAP!    lisinopril-hydrochlorothiazide (PRINZIDE,ZESTORETIC) 10-12.5 MG per tablet    [id number] - [place], [place] - [address] [place] [address] - [phone] [facility] [phone]   [phone]

## 2020-11-19 RX ORDER — LISINOPRIL AND HYDROCHLOROTHIAZIDE 12.5; 1 MG/1; MG/1
1 TABLET ORAL DAILY
Qty: 30 TABLET | Refills: 11 | Status: SHIPPED | OUTPATIENT
Start: 2020-11-19 | End: 2021-05-20 | Stop reason: SDUPTHER

## 2020-11-24 ENCOUNTER — TELEPHONE (OUTPATIENT)
Dept: FAMILY MEDICINE CLINIC | Facility: CLINIC | Age: 50
End: 2020-11-24

## 2020-11-24 DIAGNOSIS — F41.9 ANXIETY: Primary | ICD-10-CM

## 2020-11-24 RX ORDER — HYDROXYZINE HYDROCHLORIDE 25 MG/1
25 TABLET, FILM COATED ORAL 3 TIMES DAILY PRN
Qty: 60 TABLET | Refills: 0 | Status: SHIPPED | OUTPATIENT
Start: 2020-11-24 | End: 2021-05-20 | Stop reason: SDUPTHER

## 2020-12-02 DIAGNOSIS — Z76.0 MEDICATION REFILL: ICD-10-CM

## 2020-12-02 DIAGNOSIS — I10 ESSENTIAL HYPERTENSION: ICD-10-CM

## 2020-12-02 RX ORDER — LISINOPRIL AND HYDROCHLOROTHIAZIDE 12.5; 1 MG/1; MG/1
1 TABLET ORAL DAILY
Qty: 30 TABLET | Refills: 11 | Status: CANCELLED | OUTPATIENT
Start: 2020-12-02

## 2021-01-08 DIAGNOSIS — Z76.0 MEDICATION REFILL: ICD-10-CM

## 2021-01-08 DIAGNOSIS — M62.838 MUSCLE SPASM: ICD-10-CM

## 2021-01-09 RX ORDER — TIZANIDINE 4 MG/1
TABLET ORAL
Qty: 30 TABLET | Refills: 0 | Status: SHIPPED | OUTPATIENT
Start: 2021-01-09 | End: 2021-02-11

## 2021-02-11 DIAGNOSIS — E11.9 TYPE 2 DIABETES MELLITUS WITHOUT COMPLICATION, WITHOUT LONG-TERM CURRENT USE OF INSULIN (HCC): ICD-10-CM

## 2021-02-11 DIAGNOSIS — Z76.0 MEDICATION REFILL: ICD-10-CM

## 2021-02-11 DIAGNOSIS — M62.838 MUSCLE SPASM: ICD-10-CM

## 2021-02-11 RX ORDER — SITAGLIPTIN AND METFORMIN HYDROCHLORIDE 500; 50 MG/1; MG/1
TABLET, FILM COATED ORAL
Qty: 60 TABLET | Refills: 0 | Status: SHIPPED | OUTPATIENT
Start: 2021-02-11 | End: 2021-02-15

## 2021-02-11 RX ORDER — TIZANIDINE 4 MG/1
TABLET ORAL
Qty: 30 TABLET | Refills: 0 | Status: SHIPPED | OUTPATIENT
Start: 2021-02-11 | End: 2021-03-12

## 2021-02-12 ENCOUNTER — TELEPHONE (OUTPATIENT)
Dept: FAMILY MEDICINE CLINIC | Facility: CLINIC | Age: 51
End: 2021-02-12

## 2021-02-12 NOTE — TELEPHONE ENCOUNTER
Notified pt that medication was available at [facility] with a goodrx coupon for $8.09 and we could give her a coupon and send it to [facility], pt stated that she would come get the coupon on monday

## 2021-02-12 NOTE — TELEPHONE ENCOUNTER
Caller: Teena Jacobson    Relationship: Self    Best call back number: [phone]    What medications are you currently taking:   Current Outpatient Medications on File Prior to Visit   Medication Sig Dispense Refill   • ACCU-CHEK FASTCLIX LANCETS misc USE TO TEST BLOOD GLUCOSE THREE TIMES DAILY  11   • amLODIPine (NORVASC) 10 MG tablet Take 1 tablet by mouth Daily. 30 tablet 11   • cetirizine (zyrTEC) 10 MG tablet Take 1 tablet by mouth Daily. 30 tablet 11   • clindamycin (CLEOCIN T) 1 % external solution clindamycin phosphate 1 % topical solution   APPLY A THIN LAYER TO THE AFFECTED AREA(S) BY TOPICAL ROUTE 2 TIMES PER DAY     • desonide (DESOWEN) 0.05 % cream desonide 0.05 % topical cream   APPLY TO AFFECTED AREA ON EYELIDS AND FACE BID X 2 WEEKS PRN FLARES     • fluticasone (FLONASE) 50 MCG/ACT nasal spray 2 sprays by Each Nare route Daily. 16 g 11   • hydrocortisone 2.5 % cream APPLY A THIN LAYER TO THE AFFECTED AREA(S) ON THE FACE TOPICALLY TWICE DAILY FOR 2 WEEKS AS NEEDED FOR FLARES  3   • hydrOXYzine (ATARAX) 25 MG tablet Take 1 tablet by mouth 3 (Three) Times a Day As Needed for Anxiety. 60 tablet 0   • Janumet  MG per tablet TAKE 1 TABLET BY MOUTH TWO TIMES A DAY WITH MEALS 60 tablet 0   • lisinopril-hydrochlorothiazide (PRINZIDE,ZESTORETIC) 10-12.5 MG per tablet Take 1 tablet by mouth Daily. 30 tablet 11   • montelukast (Singulair) 10 MG tablet Take 1 tablet by mouth Every Night. 30 tablet 11   • tacrolimus (PROTOPIC) 0.1 % ointment Apply 1 application topically to the appropriate area as directed 2 (Two) Times a Day.     • tiZANidine (ZANAFLEX) 4 MG tablet TAKE 1 TABLET BY MOUTH AT BEDTIME AS NEEDED for muscle spasms 30 tablet 0   • traZODone (DESYREL) 50 MG tablet Take 1 tablet by mouth Every Night. 30 tablet 5   • tretinoin (RETIN-A) 0.01 % gel tretinoin 0.01 % topical gel   APPLY TO THE AFFECTED AREA(S) ON FACE BY TOPICAL ROUTE ONCE DAILY AT BEDTIME     • [DISCONTINUED] tiZANidine  (ZANAFLEX) 4 MG tablet Take 1 tablet by mouth At Night As Needed for Muscle Spasms. 30 tablet 3     No current facility-administered medications on file prior to visit.         When did you start taking these medications:     Which medication are you concerned about: tiZANidine (ZANAFLEX) 4 MG tablet    Who prescribed you this medication: GUILLE    What are your concerns: THIS MEDICATION IS NOT ON THE FORMULARY AND THE COPAY HAS GONE UP.  PATIENT IS ASKING FOR SOMETHING SIMILAR THAT IS MORE COST EFFECTIVE    How long have you been taking these medications:     How long have you had these concerns:

## 2021-02-13 DIAGNOSIS — E11.9 TYPE 2 DIABETES MELLITUS WITHOUT COMPLICATION, WITHOUT LONG-TERM CURRENT USE OF INSULIN (HCC): ICD-10-CM

## 2021-02-13 DIAGNOSIS — Z76.0 MEDICATION REFILL: ICD-10-CM

## 2021-02-15 RX ORDER — SITAGLIPTIN AND METFORMIN HYDROCHLORIDE 500; 50 MG/1; MG/1
TABLET, FILM COATED ORAL
Qty: 60 TABLET | Refills: 0 | Status: SHIPPED | OUTPATIENT
Start: 2021-02-15 | End: 2021-04-20

## 2021-03-12 DIAGNOSIS — M62.838 MUSCLE SPASM: ICD-10-CM

## 2021-03-12 DIAGNOSIS — Z76.0 MEDICATION REFILL: ICD-10-CM

## 2021-03-12 RX ORDER — TIZANIDINE HYDROCHLORIDE 4 MG/1
CAPSULE, GELATIN COATED ORAL
Qty: 30 CAPSULE | Refills: 0 | Status: SHIPPED | OUTPATIENT
Start: 2021-03-12 | End: 2021-04-20

## 2021-03-31 ENCOUNTER — IMMUNIZATION (OUTPATIENT)
Dept: VACCINE CLINIC | Facility: HOSPITAL | Age: 51
End: 2021-03-31

## 2021-03-31 PROCEDURE — 91300 HC SARSCOV02 VAC 30MCG/0.3ML IM: CPT | Performed by: INTERNAL MEDICINE

## 2021-03-31 PROCEDURE — 0001A: CPT | Performed by: INTERNAL MEDICINE

## 2021-04-15 DIAGNOSIS — G47.00 INSOMNIA, UNSPECIFIED TYPE: ICD-10-CM

## 2021-04-15 DIAGNOSIS — M62.838 MUSCLE SPASM: ICD-10-CM

## 2021-04-15 DIAGNOSIS — E11.9 TYPE 2 DIABETES MELLITUS WITHOUT COMPLICATION, WITHOUT LONG-TERM CURRENT USE OF INSULIN (HCC): ICD-10-CM

## 2021-04-15 DIAGNOSIS — Z76.0 MEDICATION REFILL: ICD-10-CM

## 2021-04-20 ENCOUNTER — TELEPHONE (OUTPATIENT)
Dept: FAMILY MEDICINE CLINIC | Facility: CLINIC | Age: 51
End: 2021-04-20

## 2021-04-20 RX ORDER — TRAZODONE HYDROCHLORIDE 50 MG/1
TABLET ORAL
Qty: 30 TABLET | Refills: 0 | Status: SHIPPED | OUTPATIENT
Start: 2021-04-20 | End: 2021-05-15

## 2021-04-20 RX ORDER — SITAGLIPTIN AND METFORMIN HYDROCHLORIDE 500; 50 MG/1; MG/1
TABLET, FILM COATED ORAL
Qty: 60 TABLET | Refills: 0 | Status: SHIPPED | OUTPATIENT
Start: 2021-04-20 | End: 2021-05-20 | Stop reason: SDUPTHER

## 2021-04-20 RX ORDER — TIZANIDINE HYDROCHLORIDE 4 MG/1
CAPSULE, GELATIN COATED ORAL
Qty: 30 CAPSULE | Refills: 0 | Status: SHIPPED | OUTPATIENT
Start: 2021-04-20 | End: 2021-05-20 | Stop reason: SDUPTHER

## 2021-04-20 NOTE — TELEPHONE ENCOUNTER
PATIENT STATES THAT SHE CALLED IN MEDICATIONS LAST WEEK, BUT THE PHARMACY HAS NOT RECEIVED THEM.  SHE IS OUT AND WONDERED IF SHE COULD GET THEM CALLED IN TODAY.  SHE CAN BE REACHED [phone]

## 2021-04-20 NOTE — TELEPHONE ENCOUNTER
Patient notified that prescriptions was sent to [facility]/[place]

## 2021-04-20 NOTE — TELEPHONE ENCOUNTER
Last seen 9-1-20    Follow up 5/14/21    [facility] send refill request 4-15-21 message was not completed.  Will you please refill

## 2021-04-22 ENCOUNTER — IMMUNIZATION (OUTPATIENT)
Dept: VACCINE CLINIC | Facility: HOSPITAL | Age: 51
End: 2021-04-22

## 2021-04-22 PROCEDURE — 91300 HC SARSCOV02 VAC 30MCG/0.3ML IM: CPT | Performed by: INTERNAL MEDICINE

## 2021-04-22 PROCEDURE — 0002A: CPT | Performed by: INTERNAL MEDICINE

## 2021-04-27 NOTE — TELEPHONE ENCOUNTER
Caller: Teena Jacobson    Relationship: Self    Best call back number: [phone]    What medication are you requesting: PATIENT'S  PASSED IN HIS SLEEP THIS MORNING AND PATIENT WOULD LIKE TO SEE OF SHE CAN GET SOMETHING PRESCRIBED FOR NERVES. SOMETHING TO HELP HER SLEEP  What are your current symptoms:     How long have you been experiencing symptoms: TODAY  Have you had these symptoms before:    [] Yes  [x] No    Have you been treated for these symptoms before:   [] Yes  [x] No    If a prescription is needed, what is your preferred pharmacy and phone number: [id number] - [place], [place] - [address] - [phone]  - [phone] FX                 
Chlamydia contact

## 2021-05-14 DIAGNOSIS — G47.00 INSOMNIA, UNSPECIFIED TYPE: ICD-10-CM

## 2021-05-14 DIAGNOSIS — Z76.0 MEDICATION REFILL: ICD-10-CM

## 2021-05-15 RX ORDER — TRAZODONE HYDROCHLORIDE 50 MG/1
TABLET ORAL
Qty: 30 TABLET | Refills: 0 | Status: SHIPPED | OUTPATIENT
Start: 2021-05-15 | End: 2021-06-14

## 2021-05-20 ENCOUNTER — OFFICE VISIT (OUTPATIENT)
Dept: FAMILY MEDICINE CLINIC | Facility: CLINIC | Age: 51
End: 2021-05-20

## 2021-05-20 VITALS
HEART RATE: 78 BPM | BODY MASS INDEX: 37.27 KG/M2 | TEMPERATURE: 98 F | WEIGHT: 197.4 LBS | HEIGHT: 61 IN | RESPIRATION RATE: 16 BRPM | SYSTOLIC BLOOD PRESSURE: 124 MMHG | OXYGEN SATURATION: 98 % | DIASTOLIC BLOOD PRESSURE: 80 MMHG

## 2021-05-20 DIAGNOSIS — Z00.00 ANNUAL PHYSICAL EXAM: Primary | ICD-10-CM

## 2021-05-20 DIAGNOSIS — F32.9 REACTIVE DEPRESSION: ICD-10-CM

## 2021-05-20 DIAGNOSIS — F41.9 ANXIETY: ICD-10-CM

## 2021-05-20 DIAGNOSIS — I10 ESSENTIAL HYPERTENSION: ICD-10-CM

## 2021-05-20 DIAGNOSIS — J30.9 ALLERGIC RHINITIS, UNSPECIFIED SEASONALITY, UNSPECIFIED TRIGGER: ICD-10-CM

## 2021-05-20 DIAGNOSIS — Z76.0 MEDICATION REFILL: ICD-10-CM

## 2021-05-20 DIAGNOSIS — Z23 NEED FOR VACCINATION AGAINST HEPATITIS B VIRUS: ICD-10-CM

## 2021-05-20 DIAGNOSIS — M62.838 MUSCLE SPASM: ICD-10-CM

## 2021-05-20 DIAGNOSIS — M65.311 TRIGGER FINGER OF RIGHT THUMB: ICD-10-CM

## 2021-05-20 DIAGNOSIS — E11.9 TYPE 2 DIABETES MELLITUS WITHOUT COMPLICATION, WITHOUT LONG-TERM CURRENT USE OF INSULIN (HCC): ICD-10-CM

## 2021-05-20 LAB
A/C: NORMAL
EXPIRATION DATE: NORMAL
HBA1C MFR BLD: 6.1 %
Lab: NORMAL
POC CREATININE URINE: NORMAL
POC MICROALBUMIN URINE: NORMAL

## 2021-05-20 PROCEDURE — 99396 PREV VISIT EST AGE 40-64: CPT | Performed by: NURSE PRACTITIONER

## 2021-05-20 PROCEDURE — 83036 HEMOGLOBIN GLYCOSYLATED A1C: CPT | Performed by: NURSE PRACTITIONER

## 2021-05-20 PROCEDURE — 82044 UR ALBUMIN SEMIQUANTITATIVE: CPT | Performed by: NURSE PRACTITIONER

## 2021-05-20 RX ORDER — DOXYCYCLINE HYCLATE 100 MG/1
100 CAPSULE ORAL
COMMUNITY
Start: 2021-05-14 | End: 2022-05-27

## 2021-05-20 RX ORDER — CHLORHEXIDINE GLUCONATE 0.12 MG/ML
RINSE ORAL
COMMUNITY
Start: 2021-05-14

## 2021-05-20 RX ORDER — AMLODIPINE BESYLATE 10 MG/1
10 TABLET ORAL DAILY
Qty: 30 TABLET | Refills: 5 | Status: SHIPPED | OUTPATIENT
Start: 2021-05-20 | End: 2021-11-09

## 2021-05-20 RX ORDER — BUPROPION HYDROCHLORIDE 75 MG/1
75 TABLET ORAL 2 TIMES DAILY
Qty: 60 TABLET | Refills: 2 | Status: SHIPPED | OUTPATIENT
Start: 2021-05-20 | End: 2021-06-19 | Stop reason: SINTOL

## 2021-05-20 RX ORDER — HYDROXYZINE HYDROCHLORIDE 25 MG/1
25 TABLET, FILM COATED ORAL 3 TIMES DAILY PRN
Qty: 60 TABLET | Refills: 2 | Status: SHIPPED | OUTPATIENT
Start: 2021-05-20 | End: 2021-06-19

## 2021-05-20 RX ORDER — MONTELUKAST SODIUM 10 MG/1
10 TABLET ORAL NIGHTLY
Qty: 30 TABLET | Refills: 11 | Status: SHIPPED | OUTPATIENT
Start: 2021-05-20 | End: 2022-06-15

## 2021-05-20 RX ORDER — CHOLECALCIFEROL (VITAMIN D3) 10(400)/ML
DROPS ORAL
COMMUNITY
End: 2021-11-09

## 2021-05-20 RX ORDER — LISINOPRIL AND HYDROCHLOROTHIAZIDE 12.5; 1 MG/1; MG/1
1 TABLET ORAL DAILY
Qty: 30 TABLET | Refills: 11 | Status: SHIPPED | OUTPATIENT
Start: 2021-05-20 | End: 2022-06-15

## 2021-05-20 RX ORDER — SITAGLIPTIN AND METFORMIN HYDROCHLORIDE 500; 50 MG/1; MG/1
1 TABLET, FILM COATED ORAL 2 TIMES DAILY WITH MEALS
Qty: 60 TABLET | Refills: 5 | Status: SHIPPED | OUTPATIENT
Start: 2021-05-20 | End: 2021-11-23 | Stop reason: SDUPTHER

## 2021-05-20 RX ORDER — TIZANIDINE HYDROCHLORIDE 4 MG/1
4 CAPSULE, GELATIN COATED ORAL 3 TIMES DAILY PRN
Qty: 90 CAPSULE | Refills: 0 | Status: SHIPPED | OUTPATIENT
Start: 2021-05-20 | End: 2021-08-23

## 2021-05-22 PROBLEM — F32.9 REACTIVE DEPRESSION: Status: ACTIVE | Noted: 2021-05-22

## 2021-05-22 PROBLEM — F41.9 ANXIETY AND DEPRESSION: Chronic | Status: ACTIVE | Noted: 2019-01-04

## 2021-05-22 PROBLEM — F32.A ANXIETY AND DEPRESSION: Chronic | Status: ACTIVE | Noted: 2019-01-04

## 2021-05-22 PROBLEM — I10 ESSENTIAL HYPERTENSION: Chronic | Status: ACTIVE | Noted: 2019-01-04

## 2021-05-22 PROBLEM — E11.9 TYPE 2 DIABETES MELLITUS WITHOUT COMPLICATION, WITHOUT LONG-TERM CURRENT USE OF INSULIN: Chronic | Status: ACTIVE | Noted: 2019-01-04

## 2021-05-22 PROBLEM — F41.9 ANXIETY: Chronic | Status: ACTIVE | Noted: 2020-02-28

## 2021-05-22 NOTE — ASSESSMENT & PLAN NOTE
Patient's depression is single episode and is mild-moderate without psychosis. Their depression is currently active and the condition is stable. This will be reassessed 6 weeks. F/U as described:patient was prescribed an antidepressant medicine. Start Wellbutrin SR 75 mg BID.

## 2021-05-26 ENCOUNTER — TELEPHONE (OUTPATIENT)
Dept: FAMILY MEDICINE CLINIC | Facility: CLINIC | Age: 51
End: 2021-05-26

## 2021-05-26 NOTE — TELEPHONE ENCOUNTER
Caller: Teena Jacobson    Relationship: Self    Best call back number: [phone]     What medications are you currently taking:   Current Outpatient Medications on File Prior to Visit   Medication Sig Dispense Refill   • ACCU-CHEK FASTCLIX LANCETS misc USE TO TEST BLOOD GLUCOSE THREE TIMES DAILY  11   • amLODIPine (NORVASC) 10 MG tablet Take 1 tablet by mouth Daily. 30 tablet 5   • buPROPion (WELLBUTRIN) 75 MG tablet Take 1 tablet by mouth 2 (Two) Times a Day. 60 tablet 2   • cetirizine (zyrTEC) 10 MG tablet Take 1 tablet by mouth Daily. 30 tablet 11   • chlorhexidine (PERIDEX) 0.12 % solution RINSE WITH 15ML FOR 1 MINUTE AND SPIT OUT AS DIRECTED     • Cholecalciferol (Vitamin D) 10 MCG/ML liquid Vitamin D   1 qd     • clindamycin (CLEOCIN T) 1 % external solution clindamycin phosphate 1 % topical solution   APPLY A THIN LAYER TO THE AFFECTED AREA(S) BY TOPICAL ROUTE 2 TIMES PER DAY     • desonide (DESOWEN) 0.05 % cream desonide 0.05 % topical cream   APPLY TO AFFECTED AREA ON EYELIDS AND FACE BID X 2 WEEKS PRN FLARES     • doxycycline (VIBRAMYCIN) 100 MG capsule Take 100 mg by mouth every night at bedtime.     • fluticasone (FLONASE) 50 MCG/ACT nasal spray 2 sprays by Each Nare route Daily. 16 g 11   • hydrocortisone 2.5 % cream APPLY A THIN LAYER TO THE AFFECTED AREA(S) ON THE FACE TOPICALLY TWICE DAILY FOR 2 WEEKS AS NEEDED FOR FLARES  3   • hydrOXYzine (ATARAX) 25 MG tablet Take 1 tablet by mouth 3 (Three) Times a Day As Needed for Anxiety. 60 tablet 2   • lisinopril-hydrochlorothiazide (PRINZIDE,ZESTORETIC) 10-12.5 MG per tablet Take 1 tablet by mouth Daily. 30 tablet 11   • montelukast (Singulair) 10 MG tablet Take 1 tablet by mouth Every Night. 30 tablet 11   • sitaGLIPtin-metFORMIN (Janumet)  MG per tablet Take 1 tablet by mouth 2 (Two) Times a Day With Meals. 60 tablet 5   • tacrolimus (PROTOPIC) 0.1 % ointment Apply 1 application topically to the appropriate area as directed 2 (Two)  Times a Day.     • TiZANidine (ZANAFLEX) 4 MG capsule Take 1 capsule by mouth 3 (Three) Times a Day As Needed for Muscle Spasms. 90 capsule 0   • traZODone (DESYREL) 50 MG tablet TAKE 1 TABLET BY MOUTH AT BEDTIME  30 tablet 0   • tretinoin (RETIN-A) 0.01 % gel tretinoin 0.01 % topical gel   APPLY TO THE AFFECTED AREA(S) ON FACE BY TOPICAL ROUTE ONCE DAILY AT BEDTIME     • [DISCONTINUED] tiZANidine (ZANAFLEX) 4 MG tablet Take 1 tablet by mouth At Night As Needed for Muscle Spasms. 30 tablet 3     No current facility-administered medications on file prior to visit.        When did you start taking these medications: traZODone (DESYREL) 50 MG tablet 1 YEAR  buPROPion (WELLBUTRIN) 75 MG tablet 1 DAY      Which medication are you concerned about: buPROPion (WELLBUTRIN) 75 MG tablet    Who prescribed you this medication: KEYONA HAN     What are your concerns: PATIENT WAS PRESCRIBED buPROPion (WELLBUTRIN) 75 MG tablet AT HER LAST APPOINTMENT ON 5/20/21. PATIENT STATES SHE JUST STARTED THE PRESCRIPTION TODAY 5/26/21. PATIENT WOULD LIKE TO KNOW IF IT IS SAFE TO TAKE THE traZODone (DESYREL) 50 MG tablet AND TiZANidine (ZANAFLEX) 4 MG capsule . PATIENT WOULD ALSO LIKE TO KNOW IF SHE DOES NOT FEEL COMFORTABLE TAKING BOTH MEDICATIONS TOGETHER CAN SHE STOP TAKING traZODone (DESYREL) 50 MG tablet.  PATIENT REQUESTS A CALLBACK REGARDING THE CONCERN.    How long have you been taking these medications:  traZODone (DESYREL) 50 MG tablet 1 YEAR  buPROPion (WELLBUTRIN) 75 MG tablet 1 DAY      How long have you had these concerns:

## 2021-05-26 NOTE — TELEPHONE ENCOUNTER
Yes she can take Wellbutrin in the morning and trazodone and Zanaflex at night.  If she only needs the Zanaflex, then yes she did stop taking the trazodone

## 2021-06-07 ENCOUNTER — TELEPHONE (OUTPATIENT)
Dept: FAMILY MEDICINE CLINIC | Facility: CLINIC | Age: 51
End: 2021-06-07

## 2021-06-07 NOTE — TELEPHONE ENCOUNTER
Provider: KEYONA HAN  Caller: RUSSEL DARBY  Relationship to Patient: SELF  Pharmacy: [facility]   Phone Number: [phone]  Reason for Call: PATIENT STATED THAT  SHE BELIEVES THAT THE WELLBUTRIN  BROKE HER OUT IN HIVES. SHE SAID THAT SHE'S BEEN TAKING IT 5/26/2021. ONE OF THE SIDE EFFECTS IS HIVES. PLEASE GIVE PATIENT A CALL

## 2021-06-12 DIAGNOSIS — Z76.0 MEDICATION REFILL: ICD-10-CM

## 2021-06-12 DIAGNOSIS — G47.00 INSOMNIA, UNSPECIFIED TYPE: ICD-10-CM

## 2021-06-14 RX ORDER — TRAZODONE HYDROCHLORIDE 50 MG/1
TABLET ORAL
Qty: 30 TABLET | Refills: 0 | Status: SHIPPED | OUTPATIENT
Start: 2021-06-14 | End: 2021-07-16

## 2021-06-18 ENCOUNTER — OFFICE VISIT (OUTPATIENT)
Dept: FAMILY MEDICINE CLINIC | Facility: CLINIC | Age: 51
End: 2021-06-18

## 2021-06-18 VITALS
TEMPERATURE: 98 F | OXYGEN SATURATION: 99 % | HEART RATE: 98 BPM | BODY MASS INDEX: 36.82 KG/M2 | HEIGHT: 61 IN | RESPIRATION RATE: 20 BRPM | DIASTOLIC BLOOD PRESSURE: 68 MMHG | WEIGHT: 195 LBS | SYSTOLIC BLOOD PRESSURE: 114 MMHG

## 2021-06-18 DIAGNOSIS — F32.9 REACTIVE DEPRESSION: ICD-10-CM

## 2021-06-18 DIAGNOSIS — F41.9 ANXIETY: Chronic | ICD-10-CM

## 2021-06-18 DIAGNOSIS — T50.905A MEDICATION REACTION, INITIAL ENCOUNTER: Primary | ICD-10-CM

## 2021-06-18 PROCEDURE — 99214 OFFICE O/P EST MOD 30 MIN: CPT | Performed by: NURSE PRACTITIONER

## 2021-06-19 PROBLEM — F32.9 REACTIVE DEPRESSION: Chronic | Status: ACTIVE | Noted: 2021-05-22

## 2021-06-19 PROBLEM — T50.905A MEDICATION REACTION, INITIAL ENCOUNTER: Status: ACTIVE | Noted: 2021-06-19

## 2021-06-19 NOTE — PROGRESS NOTES
"     Follow Up Office Note     Patient Name: Teena Jacobson  : 1970   MRN: [id number]     Chief Complaint:    Chief Complaint   Patient presents with   • Medication Reaction       History of Present Illness: Teena Jacobson is a 51 y.o. female who presents today for f/u medication reaction to Wellbutrin. Patient states that she developed a red, itchy rash on her left arm after taking Wellbutrin x 2 weeks. She states that she woke up one morning with hives on her arm. Patient discontinued Wellbutrin, took Benadryl and rash resolved.    She would like to discuss treatment options for her depression and anxiety.     Subjective      Review of Systems:   Review of Systems   Constitutional: Negative for activity change, appetite change, chills, diaphoresis, fatigue, fever and unexpected weight change.   HENT: Negative for facial swelling, trouble swallowing and voice change.    Respiratory: Negative for cough, choking, chest tightness, shortness of breath, wheezing and stridor.    Cardiovascular: Negative for chest pain, palpitations and leg swelling.   Skin: Positive for rash (resolved now. Has pictures on her phone.).   Neurological: Negative.    Psychiatric/Behavioral: Positive for dysphoric mood (depression since her   recently). Negative for agitation and suicidal ideas. The patient is nervous/anxious.         Patient rates her anxiety and depression as approximately a \"6\" on a 1-10 scale with 10 being the worst.        Past Medical History:   Past Medical History:   Diagnosis Date   • Cancer (CMS/HCC)     breast, chemo, radiation done   • Diabetes mellitus (CMS/HCC)    • H/O mastectomy    • Hypertension          Medications:     Current Outpatient Medications:   •  ACCU-CHEK FASTCLIX LANCETS misc, USE TO TEST BLOOD GLUCOSE THREE TIMES DAILY, Disp: , Rfl: 11  •  amLODIPine (NORVASC) 10 MG tablet, Take 1 tablet by mouth Daily., Disp: 30 tablet, Rfl: 5  •  cetirizine (zyrTEC) 10 MG " tablet, Take 1 tablet by mouth Daily., Disp: 30 tablet, Rfl: 11  •  chlorhexidine (PERIDEX) 0.12 % solution, RINSE WITH 15ML FOR 1 MINUTE AND SPIT OUT AS DIRECTED, Disp: , Rfl:   •  Cholecalciferol (Vitamin D) 10 MCG/ML liquid, Vitamin D  1 qd, Disp: , Rfl:   •  desonide (DESOWEN) 0.05 % cream, desonide 0.05 % topical cream  APPLY TO AFFECTED AREA ON EYELIDS AND FACE BID X 2 WEEKS PRN FLARES, Disp: , Rfl:   •  doxycycline (VIBRAMYCIN) 100 MG capsule, Take 100 mg by mouth every night at bedtime., Disp: , Rfl:   •  fluticasone (FLONASE) 50 MCG/ACT nasal spray, 2 sprays by Each Nare route Daily., Disp: 16 g, Rfl: 11  •  hydrocortisone 2.5 % cream, APPLY A THIN LAYER TO THE AFFECTED AREA(S) ON THE FACE TOPICALLY TWICE DAILY FOR 2 WEEKS AS NEEDED FOR FLARES, Disp: , Rfl: 3  •  lisinopril-hydrochlorothiazide (PRINZIDE,ZESTORETIC) 10-12.5 MG per tablet, Take 1 tablet by mouth Daily., Disp: 30 tablet, Rfl: 11  •  montelukast (Singulair) 10 MG tablet, Take 1 tablet by mouth Every Night., Disp: 30 tablet, Rfl: 11  •  sitaGLIPtin-metFORMIN (Janumet)  MG per tablet, Take 1 tablet by mouth 2 (Two) Times a Day With Meals., Disp: 60 tablet, Rfl: 5  •  tacrolimus (PROTOPIC) 0.1 % ointment, Apply 1 application topically to the appropriate area as directed 2 (Two) Times a Day., Disp: , Rfl:   •  TiZANidine (ZANAFLEX) 4 MG capsule, Take 1 capsule by mouth 3 (Three) Times a Day As Needed for Muscle Spasms., Disp: 90 capsule, Rfl: 0  •  traZODone (DESYREL) 50 MG tablet, TAKE 1 TABLET BY MOUTH AT BEDTIME , Disp: 30 tablet, Rfl: 0  •  tretinoin (RETIN-A) 0.01 % gel, tretinoin 0.01 % topical gel  APPLY TO THE AFFECTED AREA(S) ON FACE BY TOPICAL ROUTE ONCE DAILY AT BEDTIME, Disp: , Rfl:   •  hydrOXYzine (ATARAX) 25 MG tablet, Take 1 tablet by mouth 3 (Three) Times a Day As Needed for Anxiety., Disp: 60 tablet, Rfl: 2    Allergies:   Allergies   Allergen Reactions   • Wellbutrin [Bupropion] Rash         Objective     Physical  "Exam:  Vital Signs:   Vitals:    06/18/21 1509   BP: 114/68   Pulse: 98   Resp: 20   Temp: 98 °F (36.7 °C)   SpO2: 99%   Weight: 88.5 kg (195 lb)   Height: 154.9 cm (61\")   PainSc: 0-No pain     Body mass index is 36.84 kg/m².     Physical Exam  Vitals and nursing note reviewed.   Constitutional:       General: She is not in acute distress.     Appearance: Normal appearance. She is well-developed. She is not ill-appearing, toxic-appearing or diaphoretic.   HENT:      Head: Normocephalic and atraumatic.   Cardiovascular:      Rate and Rhythm: Normal rate and regular rhythm.      Heart sounds: Normal heart sounds.   Pulmonary:      Effort: Pulmonary effort is normal. No respiratory distress.      Breath sounds: Normal breath sounds. No stridor. No wheezing.   Skin:     General: Skin is warm and dry.      Findings: No rash.      Comments: Phone pictures show an urticarial rash on left forearm. Rash no longer present.   Neurological:      General: No focal deficit present.      Mental Status: She is alert and oriented to person, place, and time.   Psychiatric:         Mood and Affect: Mood normal.         Behavior: Behavior normal. Behavior is cooperative.         Thought Content: Thought content normal.         Judgment: Judgment normal.         Assessment / Plan      Assessment/Plan:   Diagnoses and all orders for this visit:    1. Medication reaction, initial encounter (Primary)  Assessment & Plan:  Discontinue Wellbutrin.      2. Reactive depression  Assessment & Plan:  Patient's depression is single episode and is moderate without  psychosis. Their depression is currently active and the condition is unchanged. This will be reassessed in 4 weeks. F/U as described:Discussed referral to behavioral health services, patient declined. Patient declined to try other antidepressant or anxiolytic medications at this time. .      3. Anxiety  Assessment & Plan:  Patient declines medications or referral to behavioral health " services at this time. Multiple medications discussed, patient states she will think about it and discuss again at next office visit.           Follow Up:   PRN and at next scheduled appointment(s) with PCP.    Discussed the nature of the medical condition(s) risks, complications, implications, management, safe and proper use of medications. Encouraged medication compliance, and keeping scheduled follow up appointments with me and any other providers.      I spent 30 minutes caring for Teena on this date of service. This time includes time spent by me in the following activities:preparing for the visit, reviewing tests, performing a medically appropriate examination and/or evaluation , counseling and educating the patient/family/caregiver and documenting information in the medical record.    RTC if symptoms fail to improve, to ER if symptoms worsen.      JAGDEEP Keen  [facility] [place]       Please note that portions of this note may have been completed with a voice recognition program. Efforts were made to edit the dictations, but occasionally words are mistranscribed.

## 2021-06-19 NOTE — ASSESSMENT & PLAN NOTE
Patient's depression is single episode and is moderate without  psychosis. Their depression is currently active and the condition is unchanged. This will be reassessed in 4 weeks. F/U as described:Discussed referral to behavioral health services, patient declined. Patient declined to try other antidepressant or anxiolytic medications at this time. .

## 2021-06-19 NOTE — ASSESSMENT & PLAN NOTE
Patient declines medications or referral to behavioral health services at this time. Multiple medications discussed, patient states she will think about it and discuss again at next office visit.

## 2021-06-19 NOTE — PATIENT INSTRUCTIONS
Managing Anxiety, Adult  After being diagnosed with an anxiety disorder, you may be relieved to know why you have felt or behaved a certain way. You may also feel overwhelmed about the treatment ahead and what it will mean for your life. With care and support, you can manage this condition and recover from it.  How to manage lifestyle changes  Managing stress and anxiety    Stress is your body's reaction to life changes and events, both good and bad. Most stress will last just a few hours, but stress can be ongoing and can lead to more than just stress. Although stress can play a major role in anxiety, it is not the same as anxiety. Stress is usually caused by something external, such as a deadline, test, or competition. Stress normally passes after the triggering event has ended.   Anxiety is caused by something internal, such as imagining a terrible outcome or worrying that something will go wrong that will devastate you. Anxiety often does not go away even after the triggering event is over, and it can become long-term (chronic) worry. It is important to understand the differences between stress and anxiety and to manage your stress effectively so that it does not lead to an anxious response.  Talk with your health care provider or a counselor to learn more about reducing anxiety and stress. He or she may suggest tension reduction techniques, such as:  · Music therapy. This can include creating or listening to music that you enjoy and that inspires you.  · Mindfulness-based meditation. This involves being aware of your normal breaths while not trying to control your breathing. It can be done while sitting or walking.  · Centering prayer. This involves focusing on a word, phrase, or sacred image that means something to you and brings you peace.  · Deep breathing. To do this, expand your stomach and inhale slowly through your nose. Hold your breath for 3-5 seconds. Then exhale slowly, letting your stomach muscles  relax.  · Self-talk. This involves identifying thought patterns that lead to anxiety reactions and changing those patterns.  · Muscle relaxation. This involves tensing muscles and then relaxing them.  Choose a tension reduction technique that suits your lifestyle and personality. These techniques take time and practice. Set aside 5-15 minutes a day to do them. Therapists can offer counseling and training in these techniques. The training to help with anxiety may be covered by some insurance plans. Other things you can do to manage stress and anxiety include:  · Keeping a stress/anxiety diary. This can help you learn what triggers your reaction and then learn ways to manage your response.  · Thinking about how you react to certain situations. You may not be able to control everything, but you can control your response.  · Making time for activities that help you relax and not feeling guilty about spending your time in this way.  · Visual imagery and yoga can help you stay calm and relax.    Medicines  Medicines can help ease symptoms. Medicines for anxiety include:  · Anti-anxiety drugs.  · Antidepressants.  Medicines are often used as a primary treatment for anxiety disorder. Medicines will be prescribed by a health care provider. When used together, medicines, psychotherapy, and tension reduction techniques may be the most effective treatment.  Relationships  Relationships can play a big part in helping you recover. Try to spend more time connecting with trusted friends and family members. Consider going to couples counseling, taking family education classes, or going to family therapy. Therapy can help you and others better understand your condition.  How to recognize changes in your anxiety  Everyone responds differently to treatment for anxiety. Recovery from anxiety happens when symptoms decrease and stop interfering with your daily activities at home or work. This may mean that you will start to:  · Have  better concentration and focus. Worry will interfere less in your daily thinking.  · Sleep better.  · Be less irritable.  · Have more energy.  · Have improved memory.  It is important to recognize when your condition is getting worse. Contact your health care provider if your symptoms interfere with home or work and you feel like your condition is not improving.  Follow these instructions at home:  Activity  · Exercise. Most adults should do the following:  ? Exercise for at least 150 minutes each week. The exercise should increase your heart rate and make you sweat (moderate-intensity exercise).  ? Strengthening exercises at least twice a week.  · Get the right amount and quality of sleep. Most adults need 7-9 hours of sleep each night.  Lifestyle    · Eat a healthy diet that includes plenty of vegetables, fruits, whole grains, low-fat dairy products, and lean protein. Do not eat a lot of foods that are high in solid fats, added sugars, or salt.  · Make choices that simplify your life.  · Do not use any products that contain nicotine or tobacco, such as cigarettes, e-cigarettes, and chewing tobacco. If you need help quitting, ask your health care provider.  · Avoid caffeine, alcohol, and certain over-the-counter cold medicines. These may make you feel worse. Ask your pharmacist which medicines to avoid.  General instructions  · Take over-the-counter and prescription medicines only as told by your health care provider.  · Keep all follow-up visits as told by your health care provider. This is important.  Where to find support  You can get help and support from these sources:  · Self-help groups.  · Online and community organizations.  · A trusted spiritual leader.  · Couples counseling.  · Family education classes.  · Family therapy.  Where to find more information  You may find that joining a support group helps you deal with your anxiety. The following sources can help you locate counselors or support groups near  you:  · Mental Health [place]: www.mentalhealth[place].net  · Anxiety and Depression Association of [place] (ADAA): www.adaa.org  · National [place] on [place]ness (CATIE): www.catie.org  Contact a health care provider if you:  · Have a hard time staying focused or finishing daily tasks.  · Spend many hours a day feeling worried about everyday life.  · Become exhausted by worry.  · Start to have headaches, feel tense, or have nausea.  · Urinate more than normal.  · Have diarrhea.  Get help right away if you have:  · A racing heart and shortness of breath.  · Thoughts of hurting yourself or others.  If you ever feel like you may hurt yourself or others, or have thoughts about taking your own life, get help right away. You can go to your nearest emergency department or call:  · Your local emergency services (911 in the [place]  · A suicide crisis helpline, such as the National Suicide Prevention Lifeline at 1-913.525.6453. This is open 24 hours a day.  Summary  · Taking steps to learn and use tension reduction techniques can help calm you and help prevent triggering an anxiety reaction.  · When used together, medicines, psychotherapy, and tension reduction techniques may be the most effective treatment.  · Family, friends, and partners can play a big part in helping you recover from an anxiety disorder.  This information is not intended to replace advice given to you by your health care provider. Make sure you discuss any questions you have with your health care provider.  Document Revised: 2020 Document Reviewed: 2020  [employer] Patient Education ©  [employer]  Anxiety  You will learn about anxiety, including the symptoms, diagnosis and treatment options.  To view the content, go to this web address:  [email or web address]    This video will  on: 2023. If you need access to this video following this date, please reach out to the healthcare provider who assigned it to  "you.  This information is not intended to replace advice given to you by your health care provider. Make sure you discuss any questions you have with your health care provider.  [employer]’s editorial and clinical teams regularly review and update content to ensure it is up-to-date with changing practice standards and recognized medical guidelines.  [employer] Patient Education 2020 Elsevier Inc.  Depression  You will learn about depression, including the symptoms, diagnosis and treatment options.  To view the content, go to this web address:  [email or web address]    This video will  on: 2023. If you need access to this video following this date, please reach out to the healthcare provider who assigned it to you.  This information is not intended to replace advice given to you by your health care provider. Make sure you discuss any questions you have with your health care provider.  [employer]’s editorial and clinical teams regularly review and update content to ensure it is up-to-date with changing practice standards and recognized medical guidelines.  [employer] Patient Education 2020 [employer].    http://APA.org/depression-guideline\"> https://clinicalkey.com\"> http://point-of-care.[employer]/skills/\"> http://point-of-care.[employer].com\">   Managing Depression, Adult  Depression is a mental health condition that affects your thoughts, feelings, and actions. Being diagnosed with depression can bring you relief if you did not know why you have felt or behaved a certain way. It could also leave you feeling overwhelmed with uncertainty about your future. Preparing yourself to manage your symptoms can help you feel more positive about your future.  How to manage lifestyle changes  Managing stress    Stress is your body's reaction to life changes and events, both good and bad. Stress can add to your feelings of depression. Learning to manage your stress can help " lessen your feelings of depression.  Try some of the following approaches to reducing your stress (stress reduction techniques):  · Listen to music that you enjoy and that inspires you.  · Try using a meditation allen or take a meditation class.  · Develop a practice that helps you connect with your spiritual self. Walk in nature, pray, or go to a place of Church.  · Do some deep breathing. To do this, inhale slowly through your nose. Pause at the top of your inhale for a few seconds and then exhale slowly, letting your muscles relax.  · Practice yoga to help relax and work your muscles.  Choose a stress reduction technique that suits your lifestyle and personality. These techniques take time and practice to develop. Set aside 5-15 minutes a day to do them. Therapists can offer training in these techniques. Other things you can do to manage stress include:  · Keeping a stress diary.  · Knowing your limits and saying no when you think something is too much.  · Paying attention to how you react to certain situations. You may not be able to control everything, but you can change your reaction.  · Adding humor to your life by watching funny films or TV shows.  · Making time for activities that you enjoy and that relax you.    Medicines  Medicines, such as antidepressants, are often a part of treatment for depression.  · Talk with your pharmacist or health care provider about all the medicines, supplements, and herbal products that you take, their possible side effects, and what medicines and other products are safe to take together.  · Make sure to report any side effects you may have to your health care provider.  Relationships  Your health care provider may suggest family therapy, couples therapy, or individual therapy as part of your treatment.  How to recognize changes  Everyone responds differently to treatment for depression. As you recover from depression, you may start to:  · Have more interest in doing  activities.  · Feel less hopeless.  · Have more energy.  · Overeat less often, or have a better appetite.  · Have better mental focus.  It is important to recognize if your depression is not getting better or is getting worse. The symptoms you had in the beginning may return, such as:  · Tiredness (fatigue) or low energy.  · Eating too much or too little.  · Sleeping too much or too little.  · Feeling restless, agitated, or hopeless.  · Trouble focusing or making decisions.  · Unexplained physical complaints.  · Feeling irritable, angry, or aggressive.  If you or your family members notice these symptoms coming back, let your health care provider know right away.  Follow these instructions at home:  Activity    · Try to get some form of exercise each day, such as walking, biking, swimming, or lifting weights.  · Practice stress reduction techniques.  · Engage your mind by taking a class or doing some volunteer work.  Lifestyle  · Get the right amount and quality of sleep.  · Cut down on using caffeine, tobacco, alcohol, and other potentially harmful substances.  · Eat a healthy diet that includes plenty of vegetables, fruits, whole grains, low-fat dairy products, and lean protein. Do not eat a lot of foods that are high in solid fats, added sugars, or salt (sodium).  General instructions  · Take over-the-counter and prescription medicines only as told by your health care provider.  · Keep all follow-up visits as told by your health care provider. This is important.  Where to find support  Talking to others    Friends and family members can be sources of support and guidance. Talk to trusted friends or family members about your condition. Explain your symptoms to them, and let them know that you are working with a health care provider to treat your depression. Tell friends and family members how they also can be helpful.  Finances  · Find appropriate mental health providers that fit with your financial  situation.  · Talk with your health care provider about options to get reduced prices on your medicines.  Where to find more information  You can find support in your area from:  · Anxiety and Depression Association of [place] (ADAA): www.adaa.org  · Mental Health [place]: www.mentalhealthamer[place].net  · National [place] on Mental Illness: www.annie.org  Contact a health care provider if:  · You stop taking your antidepressant medicines, and you have any of these symptoms:  ? Nausea.  ? Headache.  ? Light-headedness.  ? Chills and body aches.  ? Not being able to sleep (insomnia).  · You or your friends and family think your depression is getting worse.  Get help right away if:  · You have thoughts of hurting yourself or others.  If you ever feel like you may hurt yourself or others, or have thoughts about taking your own life, get help right away. Go to your nearest emergency department or:  · Call your local emergency services (911 in the [place]  · Call a suicide crisis helpline, such as the National Suicide Prevention Lifeline at 1-891.319.3682. This is open 24 hours a day in the [place]  · Text the Crisis Text Line at 255216 (in the [place]  Summary  · If you are diagnosed with depression, preparing yourself to manage your symptoms is a good way to feel positive about your future.  · Work with your health care provider on a management plan that includes stress reduction techniques, medicines (if applicable), therapy, and healthy lifestyle habits.  · Keep talking with your health care provider about how your treatment is working.  · If you have thoughts about taking your own life, call a suicide crisis helpline or text a crisis text line.  This information is not intended to replace advice given to you by your health care provider. Make sure you discuss any questions you have with your health care provider.  Document Revised: 10/28/2020 Document Reviewed: 10/28/2020  Elsevier Patient Education © 2021 Elsevier  Inc.    Drug Rash    A drug rash occurs when a medicine causes a change in the color or texture of the skin. It can develop minutes, hours, or days after you take the medicine. The rash may appear on a small area of skin or all over your body.  What are the causes?  This condition is usually caused by your body's reaction (allergy) to a medicine. It can also be caused by exposure to sunlight after taking a medicine that makes your skin sensitive to light.  Though any medicine can cause a rash or reaction, medicines that are more likely to cause rashes include:  · Penicillin.  · Antibiotic medicines.  · Medicines that treat seizures.  · Medicines that treat cancer (chemotherapy).  · Aspirin and other NSAIDs.  · Injectable dyes that contain iodine.  · Insulin.  What are the signs or symptoms?  Symptoms of this condition include:  · Redness.  · Tiny bumps.  · Peeling.  · Itching.  · Itchy welts (hives).  · Swelling.  How is this diagnosed?  This condition may be diagnosed based on:  · A physical exam.  · Tests to find out which medicine caused the rash. These tests may include:  ? Skin tests.  ? Blood tests.  ? Challenge test. For this test, you stop taking all the medicines that you do not need to take. Then, you start taking them again by adding back one medicine at a time.  How is this treated?  This condition is treated with medicines, including:  · Antihistamine. This may be given to relieve itching.  · NSAIDs. These may be given to reduce swelling and to treat pain.  · A steroid medicine. This may be given to reduce swelling.  The rash usually goes away when you stop taking the medicine that caused it.  Follow these instructions at home:  · Take over-the-counter and prescription medicines only as told by your health care provider.  · Tell all your health care providers about any medicine reactions that you have had in the past.  · If your rash was caused by sensitivity to sunlight, and while your rash is  healing:  ? Avoid being in the sun if possible, especially when it is strongest, usually between 10 a.m. and 4 p.m.  ? Cover your skin with pants, long sleeves, and a hat when you are exposed to sunlight.  · If you have hives:  ? Take a cool shower or use a cool compress to relieve itchiness.  ? Take over-the-counter antihistamines, as recommended by your health care provider, until the hives are gone. Hives are not contagious.  · Keep all follow-up visits as told by your health care provider. This is important.  Contact a health care provider if you have:  · A fever.  · A rash that is not going away.  · A rash that gets worse.  · A rash that comes back.  · Wheezing or coughing.  Get help right away if:  · You start to have breathing problems.  · You start to have shortness of breath.  · Your face or throat starts to swell.  · You have severe weakness with dizziness or fainting.  · You have chest pain.  These symptoms may represent a serious problem that is an emergency. Do not wait to see if the symptoms will go away. Get medical help right away. Call your local emergency services (911 in the [place] Do not drive yourself to the hospital.  Summary  · A drug rash occurs when a medicine causes a change in the color or texture of the skin. The rash may appear on a small area of skin or all over your body.  · It can develop minutes, hours, or days after you take the medicine.  · Your health care provider will do various tests to determine what medicine caused your rash.  · The rash may be treated with medicine to relieve itching, swelling, and pain.  This information is not intended to replace advice given to you by your health care provider. Make sure you discuss any questions you have with your health care provider.  Document Revised: 11/30/2018 Document Reviewed: 11/08/2018  Elsevier Patient Education © 2021 Elsevier Inc.

## 2021-07-12 ENCOUNTER — TELEPHONE (OUTPATIENT)
Dept: FAMILY MEDICINE CLINIC | Facility: CLINIC | Age: 51
End: 2021-07-12

## 2021-07-12 NOTE — TELEPHONE ENCOUNTER
Caller: Teena Jacobson    Relationship: Self    Best call back number: [phone]    What medication are you requesting: CELEXA    What are your current symptoms: PCP WANTED PATIENT TO TRY THE  CELEXA --THE VERY LOWEST DOSE AND MILDEST MEDICINE.     SHE WANTS TO TRY MEDS AND WILL RESCHEDULE FOLLOWUP VISIT TO DISCUSS THE MEDICATION AND HOW SHE'S FEELING.     PATIENT SAYS HER ANXIETY IS STILL THERE;     SAYS THAT THE ORIGINAL RX FOR WELLBUTRIN CAUSED HER TO HAVE HIVES AND HAD DISCUSSED WITH HER.     How long have you been experiencing symptoms:SINCE THE DEATH OF HER .     Have you had these symptoms before:    [x] Yes  [] No    Have you been treated for these symptoms before:   [x] Yes  [] No    If a prescription is needed, what is your preferred pharmacy and phone number: [id number] - [place], [place] - [address] - [phone]  - [phone] FX

## 2021-07-14 DIAGNOSIS — Z76.0 MEDICATION REFILL: ICD-10-CM

## 2021-07-14 DIAGNOSIS — G47.00 INSOMNIA, UNSPECIFIED TYPE: ICD-10-CM

## 2021-07-15 DIAGNOSIS — F32.9 REACTIVE DEPRESSION: ICD-10-CM

## 2021-07-15 DIAGNOSIS — F41.9 ANXIETY: Primary | ICD-10-CM

## 2021-07-16 RX ORDER — TRAZODONE HYDROCHLORIDE 50 MG/1
TABLET ORAL
Qty: 30 TABLET | Refills: 0 | Status: SHIPPED | OUTPATIENT
Start: 2021-07-16 | End: 2021-08-16

## 2021-08-15 DIAGNOSIS — G47.00 INSOMNIA, UNSPECIFIED TYPE: ICD-10-CM

## 2021-08-15 DIAGNOSIS — Z76.0 MEDICATION REFILL: ICD-10-CM

## 2021-08-16 RX ORDER — TRAZODONE HYDROCHLORIDE 50 MG/1
TABLET ORAL
Qty: 30 TABLET | Refills: 0 | Status: SHIPPED | OUTPATIENT
Start: 2021-08-16 | End: 2021-09-22

## 2021-08-21 DIAGNOSIS — Z76.0 MEDICATION REFILL: ICD-10-CM

## 2021-08-21 DIAGNOSIS — M62.838 MUSCLE SPASM: ICD-10-CM

## 2021-08-23 RX ORDER — TIZANIDINE HYDROCHLORIDE 4 MG/1
CAPSULE, GELATIN COATED ORAL
Qty: 90 CAPSULE | Refills: 0 | Status: SHIPPED | OUTPATIENT
Start: 2021-08-23 | End: 2022-12-20

## 2021-08-25 ENCOUNTER — TELEMEDICINE (OUTPATIENT)
Dept: PSYCHIATRY | Facility: CLINIC | Age: 51
End: 2021-08-25

## 2021-08-25 DIAGNOSIS — F43.21 GRIEF: Primary | ICD-10-CM

## 2021-08-25 PROCEDURE — 90791 PSYCH DIAGNOSTIC EVALUATION: CPT | Performed by: COUNSELOR

## 2021-08-25 NOTE — PROGRESS NOTES
This provider is located at the [facility] ([facility] [address], [place] [address] using a secure [employer] Video Visit through [employer]. Patient is being seen remotely via telehealth at home address in [place] and stated they are in a secure environment for this session. The patient's condition being diagnosed/treated is appropriate for telemedicine. The provider identified herself as well as her credentials. The patient, and/or patients guardian, consent to be seen remotely, and when consent is given they understand that the consent allows for patient identifiable information to be sent to a third party as needed. They may refuse to be seen remotely at any time. The electronic data is encrypted and password protected, and the patient and/or guardian has been advised of the potential risks to privacy not withstanding such measures.     You have chosen to receive care through a telehealth visit.  Do you consent to use a video/audio connection for your medical care today? Yes    Subjective   Teena Jacobson is a 51 y.o. female who presents today for initial evaluation  Unfortunately Patient reports the unexpected loss of her  9 months ago. Patient repots difficulty managing through emotions since his passing stating that she has had to reside in apartment due to not feeling comfortable returning to the home where they lived and he passed in. Patient reports feelings as if her father did not provide emotional support as to what she would have expected from him; this has also declined mood. Patient reports feeling alone, struggling with racing thoughts, difficulty sleeping, mourning, and feeling as if she always has to be doing something insinuating difficulty relaxing. 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     Time:  1400  Name of PCP: JAGDEEP Keen   Referral source: JAGDEEP Keen     Chief Complaint:  Grief     Patient adamantly and convincingly denies current suicidal or homicidal ideation or perceptual disturbance.    Childhood Experiences:   Born and raised in [place]   Raised by parents; .   1 older brother and 1 older sister.   Good childhood; I didn't like the school system not a lot of black people. Small town and every one knows your business.     Significant Life Events:  Mom  I was 20 in MVA.   Breast cancer at age 39 years old.    passed unexpectedly while sleeping; they said it was heart related. Two days before 2020.   Got an apartment in February because I couldn't stay there because where he  in the home.     Social History:   Social History     Socioeconomic History   • Marital status:      Spouse name: Not on file   • Number of children: Not on file   • Years of education: Not on file   • Highest education level: Not on file   Tobacco Use   • Smoking status: Never Smoker   • Smokeless tobacco: Never Used   Substance and Sexual Activity   • Alcohol use: No   • Drug use: No   • Sexual activity: Defer     Marital Status:   for 31 years; high school sweethearts.   Children: 2 boys; 33 and 29 years old  Grandchildren: 2    Patient's current living situation: alone in an apartment     Support system: 2 children, 2 grandchildren, cousin, aunt, sister, brother in law, and dad    Close with family members: yes Issues with my dad after my  . He wasn't the emotional support of the parent that I thought he would have been.     Religous: yes; Anabaptist     Work History:  Ever been active duty in the ? 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     Legal History:  The patient has no significant history of legal issues. The patient has no history of significant violence.    Past Medical  History:  Past Medical History:   Diagnosis Date   • Cancer (CMS/HCC)     breast, chemo, radiation done   • Diabetes mellitus (CMS/HCC)    • H/O mastectomy    • Hypertension        Past Surgical History:  Past Surgical History:   Procedure Laterality Date   • D & C HYSTEROSCOPY ENDOMETRIAL ABLATION DIAGNOSTIC LAPAROSCOPY  2009   • MASTECTOMY PARTIAL / LUMPECTOMY Left 2009       Physical Exam:   There were no vitals taken for this visit. There is no height or weight on file to calculate BMI.     History of prior treatment or hospitalization: Denies     Allergy:   Allergies   Allergen Reactions   • Wellbutrin [Bupropion] Rash        Current Medications:   Current Outpatient Medications   Medication Sig Dispense Refill   • ACCU-CHEK FASTCLIX LANCETS misc USE TO TEST BLOOD GLUCOSE THREE TIMES DAILY  11   • amLODIPine (NORVASC) 10 MG tablet Take 1 tablet by mouth Daily. 30 tablet 5   • cetirizine (zyrTEC) 10 MG tablet Take 1 tablet by mouth Daily. 30 tablet 11   • chlorhexidine (PERIDEX) 0.12 % solution RINSE WITH 15ML FOR 1 MINUTE AND SPIT OUT AS DIRECTED     • Cholecalciferol (Vitamin D) 10 MCG/ML liquid Vitamin D   1 qd     • desonide (DESOWEN) 0.05 % cream desonide 0.05 % topical cream   APPLY TO AFFECTED AREA ON EYELIDS AND FACE BID X 2 WEEKS PRN FLARES     • doxycycline (VIBRAMYCIN) 100 MG capsule Take 100 mg by mouth every night at bedtime.     • fluticasone (FLONASE) 50 MCG/ACT nasal spray 2 sprays by Each Nare route Daily. 16 g 11   • hydrocortisone 2.5 % cream APPLY A THIN LAYER TO THE AFFECTED AREA(S) ON THE FACE TOPICALLY TWICE DAILY FOR 2 WEEKS AS NEEDED FOR FLARES  3   • lisinopril-hydrochlorothiazide (PRINZIDE,ZESTORETIC) 10-12.5 MG per tablet Take 1 tablet by mouth Daily. 30 tablet 11   • montelukast (Singulair) 10 MG tablet Take 1 tablet by mouth Every Night. 30 tablet 11   • sitaGLIPtin-metFORMIN (Janumet)  MG per tablet Take 1 tablet by mouth 2 (Two) Times a Day With Meals. 60 tablet 5   •  tacrolimus (PROTOPIC) 0.1 % ointment Apply 1 application topically to the appropriate area as directed 2 (Two) Times a Day.     • TiZANidine (ZANAFLEX) 4 MG capsule TAKE 1 CAPSULE BY MOUTH THREE TIMES A DAY AS NEEDED FOR MUSCLE SPASM 90 capsule 0   • traZODone (DESYREL) 50 MG tablet TAKE 1 TABLET BY MOUTH AT BEDTIME  30 tablet 0   • tretinoin (RETIN-A) 0.01 % gel tretinoin 0.01 % topical gel   APPLY TO THE AFFECTED AREA(S) ON FACE BY TOPICAL ROUTE ONCE DAILY AT BEDTIME       No current facility-administered medications for this visit.       Lab Results:   No visits with results within 1 Month(s) from this visit.   Latest known visit with results is:   Office Visit on 05/20/2021   Component Date Value Ref Range Status   • Hemoglobin A1C 05/20/2021 6.1  % Final   • Microalbumin, Urine 05/20/2021 10mg/L   Final   • Creatinine, Urine 05/20/2021 200mg/dL   Final   • A/C 05/20/2021 <30mg/g   Final   • Lot Number 05/20/2021 [id number]   Final   • Expiration Date 05/20/2021 3/31/2021   Final       Family History:  Family History   Problem Relation Age of Onset   • No Known Problems Mother    • Heart disease Father    • Emphysema Father    • COPD Father    • Hypertension Father    • Arthritis Father    • Hypertension Sister    • Hypertension Brother        Problem List:  Patient Active Problem List   Diagnosis   • Type 2 diabetes mellitus without complication, without long-term current use of insulin (CMS/[facility])   • Acne vulgaris   • Atopic dermatitis   • Allergic rhinitis   • Deafness in right ear   • Anxiety and depression   • Essential hypertension   • Obesity (BMI 30-39.9)   • Migraine headache   • Anxiety   • Reactive depression   • Medication reaction, initial encounter       History of Substance Use:   Patient answered no  to experiencing two or more of the following problems related to substance use: using more than intended or over longer period than intended; difficulty quitting or cutting back use; spending a great deal  of time obtaining, using, or recovering from using; craving or strong desire or urge to use;  work and/or school problems; financial problems; family problems; using in dangerous situations; physical or mental health problems; relapse; feelings of guilt or remorse about use; times when used and/or drank alone; needing to use more in order to achieve the desired effect; illness or withdrawal when stopping or cutting back use; using to relieve or avoid getting ill or developing withdrawal symptoms; and black outs and/or memory issues when using.      SUICIDE RISK ASSESSMENT/CSSRS  1. Does patient have thoughts of suicide? no  2. Does patient have intent for suicide? no  3. Does patient have a current plan for suicide? no  4. History of suicide attempts: no  5. Family history of suicide or attempts: no  6. History of violent behaviors towards others or property or thoughts of committing suicide: no  7. History of sexual aggression toward others: no  8. Access to firearms or weapons: no    PHQ-Score Total:  PHQ-9 Total Score: (P) 3    OMAYRA-7 Score Total: 9    Mental Status Exam:   Hygiene:   good  Cooperation:  Cooperative  Eye Contact:  Good  Psychomotor Behavior:  Appropriate  Affect:  Appropriate  Mood: sad  Speech:  Normal  Thought Process:  Linear  Thought Content:  Normal  Suicidal:  None  Homicidal:  None  Hallucinations:  None  Delusion:  None  Memory:  Intact  Orientation:  Person, Place, Time and Situation  Reliability:  fair  Insight:  Fair  Judgement:  Fair  Impulse Control:  Fair    Impression/Formulation:    Patient appeared alert and oriented.  Patient is voluntarily requesting to begin outpatient therapy at [facility].  Patient is receptive to assistance with maintaining a stable lifestyle.  Patient presents with history of grief.  Patient is agreeable to attend routine therapy sessions.  Patient expressed desire to maintain stability and participate in the therapeutic  process.        Assessment/Plan   Diagnoses and all orders for this visit:    1. Grief (Primary)        Visit Diagnoses:    ICD-10-CM ICD-9-CM   1. Grief  F43.21 309.0        Functional Status: Mild impairment     Prognosis: Fair with Ongoing Treatment     Treatment Plan: Continue supportive psychotherapy efforts and medications as indicated. Obtain release of information for current treatment team for continuity of care as needed. Patient will adhere to medication regimen as prescribed and report any side effects. Patient will contact this office, call 911 or present to the nearest emergency room should suicidal or homicidal ideations occur.    Short Term Goals: Patient will be compliant with medication, and patient will have no significant medication related side effects.  Patient will be engaged in psychotherapy as indicated.  Patient will report subjective improvement of symptoms.    Long Term Goals: To stabilize mood and treat/improve subjective symptoms, the patient will stay out of the hospital, the patient will be at an optimal level of functioning, and the patient will take all medications as prescribed.The patient verbalized understanding and agreement with goals that were mutually set.    Crisis Plan:    If symptoms/behaviors persist, patient will present to the nearest hospital for an assessment. Advised patient of [facility] ER 24/7 assessment services.     [facility] No Show Policy:  We understand unexpected circumstances arise; however, anytime you miss your appointment we are unable to provide you appropriate care.  In addition, each appointment missed could have been used to provide care for others.  We ask that you call at least 24 hours in advance to cancel or reschedule an appointment.  We would like to take this opportunity to remind you of our policy stating patients who miss THREE or more appointments without cancelling or rescheduling 24 hours in advance of the  appointment may be subject to cancellation of any further visits with our clinic and recommendation to seek in-person services/visits.    Please call [phone] to reschedule your appointment. If there are reasons that make it difficult for you to keep the appointments, please call and let us know how we can help.  Please understand that medication prescribing will not continue without seeing your provider.      [facility]'s No Show Policy reviewed with patient at today's visit. Patient verbalized understanding of this policy. Discussed with patient that in the event that there are three or more no show visits, it will be recommended that they pursue in-person services/visits as noncompliance with telehealth visits indicates that patient is not an appropriate candidate for telemedicine and would likely be more appropriate for in-person services/visits. Patient verbalizes understanding and is agreeable to this.           This document has been electronically signed by Milady Luz LCSW  August 25, 2021 15:35 EDT    Part of this note may be an electronic transcription/translation of spoken language to printed text using the Dragon Dictation System.

## 2021-09-21 DIAGNOSIS — Z76.0 MEDICATION REFILL: ICD-10-CM

## 2021-09-21 DIAGNOSIS — G47.00 INSOMNIA, UNSPECIFIED TYPE: ICD-10-CM

## 2021-09-22 RX ORDER — TRAZODONE HYDROCHLORIDE 50 MG/1
TABLET ORAL
Qty: 30 TABLET | Refills: 0 | Status: SHIPPED | OUTPATIENT
Start: 2021-09-22 | End: 2021-10-22

## 2021-10-04 ENCOUNTER — TELEMEDICINE (OUTPATIENT)
Dept: PSYCHIATRY | Facility: CLINIC | Age: 51
End: 2021-10-04

## 2021-10-04 DIAGNOSIS — F43.21 GRIEF: Primary | ICD-10-CM

## 2021-10-04 PROCEDURE — 90834 PSYTX W PT 45 MINUTES: CPT | Performed by: COUNSELOR

## 2021-10-04 NOTE — PROGRESS NOTES
"Date: October 6, 2021  Time In: 1447  Time Out: 1531  This provider is located at [facility] [address], [place] [address] using a secure [employer] Video Visit through [employer]. Patient is being seen remotely via telehealth at home address in [place] and stated they are in a secure environment for this session. The patient's condition being diagnosed/treated is appropriate for telemedicine. The provider identified herself as well as her credentials. The patient, and/or patients guardian, consent to be seen remotely, and when consent is given they understand that the consent allows for patient identifiable information to be sent to a third party as needed. They may refuse to be seen remotely at any time. The electronic data is encrypted and password protected, and the patient and/or guardian has been advised of the potential risks to privacy not withstanding such measures.     You have chosen to receive care through a telehealth visit.  Do you consent to use a video/audio connection for your medical care today? Yes    PROGRESS NOTE  Data:  Teena Jacobson is a 51 y.o. female who presents today for follow up    Chief Complaint: Grief     History of Present Illness: Patient discussed medical concerns and upcoming procedures and how these elements have increased stress and worry. Patient also reports current family dynamics and how her sons are not speaking to one another. Patient discussed her  and identified him as the \"peacemaker\" of the family and how without him she has noticed a strain among family members. Patient reports that she is uncertain if her sons' emotions are intensified due to the loss of their father but believes this situation would have been different if their father was still present. Patient states that she is trying to meditate among them but voiced different personalities and communication styles that are " causing situation to worsen. Patient voiced feeling lonely and finds herself feeling more isolated at night. Patient discussed positive memories and characteristics of her .       Clinical Maneuvering/Intervention:    (Scales based on 0 - 10 with 10 being the worst)  Depression: 6 Anxiety: 6     OMAYRA-7  Feeling nervous, anxious or on edge: (P) Several days  Not being able to stop or control worrying: (P) More than half the days  Worrying too much about different things: (P) More than half the days  Trouble Relaxing: (P) Several days  Being so restless that it is hard to sit still: (P) Several days  Feeling afraid as if something awful might happen: (P) Several days  Becoming easily annoyed or irritable: (P) Not at all  OMAYRA 7 Total Score: (P) 8  If you checked any problems, how difficult have these problems made it for you to do your work, take care of things at home, or get along with other people: (P) Not difficult at all   PHQ-9 Total Score: (P) 6     Assisted patient in processing above session content; acknowledged and normalized patient’s thoughts, feelings, and concerns.  Rationalized patient thought process regarding the desire for her sons to make amends.  Discussed triggers associated with patient's grief and anxiety.  Also discussed coping skills for patient to implement such as keeping a notepad beside bed to write thoughts down. Encouraged Patient to break up nightly routine and suggested Patient to take a evening drive.     Allowed patient to freely discuss issues without interruption or judgment. Provided safe, confidential environment to facilitate the development of positive therapeutic relationship and encourage open, honest communication. Assisted patient in identifying risk factors which would indicate the need for higher level of care including thoughts to harm self or others and/or self-harming behavior and encouraged patient to contact this office, call 911, or present to the nearest  emergency room should any of these events occur. Discussed crisis intervention services and means to access. Patient adamantly and convincingly denies current suicidal or homicidal ideation or perceptual disturbance.    Assessment:   Assessment   Patient appears to maintain relative stability as compared to their baseline.  However, patient continues to struggle with grief which continues to cause impairment in important areas of functioning.  A result, they can be reasonably expected to continue to benefit from treatment and would likely be at increased risk for decompensation otherwise.    Mental Status Exam:   Hygiene:   good  Cooperation:  Cooperative  Eye Contact:  Good  Psychomotor Behavior:  Appropriate  Affect:  Appropriate  Mood: normal  Speech:  Normal  Thought Process:  Linear  Thought Content:  Normal  Suicidal:  None  Homicidal:  None  Hallucinations:  None  Delusion:  None  Memory:  Intact  Orientation:  Person, Place, Time and Situation  Reliability:  fair  Insight:  Fair  Judgement:  Fair  Impulse Control:  Fair  Physical/Medical Issues:  No        Patient's Support Network Includes:  children    Functional Status: Mild impairment     Progress toward goal: Not at goal    Prognosis: Fair with Ongoing Treatment          Plan:    Patient will continue in individual outpatient therapy with focus on improved functioning and coping skills, maintaining stability, and avoiding decompensation and the need for higher level of care.    Patient will adhere to medication regimen as prescribed and report any side effects. Patient will contact this office, call 911 or present to the nearest emergency room should suicidal or homicidal ideations occur. Provide Cognitive Behavioral Therapy and Solution Focused Therapy to improve functioning, maintain stability, and avoid decompensation and the need for higher level of care.     Return in about 3 weeks, or earlier if symptoms worsen or fail to improve.           VISIT  DIAGNOSIS:     ICD-10-CM ICD-9-CM   1. Grief  F43.21 309.0          [facility] No Show Policy:  We understand unexpected circumstances arise; however, anytime you miss your appointment we are unable to provide you appropriate care.  In addition, each appointment missed could have been used to provide care for others.  We ask that you call at least 24 hours in advance to cancel or reschedule an appointment.  We would like to take this opportunity to remind you of our policy stating patients who miss THREE or more appointments without cancelling or rescheduling 24 hours in advance of the appointment may be subject to cancellation of any further visits with our clinic and recommendation to seek in-person services/visits.    Please call [phone] to reschedule your appointment. If there are reasons that make it difficult for you to keep the appointments, please call and let us know how we can help.  Please understand that medication prescribing will not continue without seeing your provider.      [facility]'s No Show Policy reviewed with patient at today's visit. Patient verbalized understanding of this policy. Discussed with patient that in the event that there are three or more no show visits, it will be recommended that they pursue in-person services/visits as noncompliance with telehealth visits indicates that patient is not an appropriate candidate for telemedicine and would likely be more appropriate for in-person services/visits. Patient verbalizes understanding and is agreeable to this.        This document has been electronically signed by Milady Luz LCSW  October 6, 2021 09:12 EDT      Part of this note may be an electronic transcription/translation of spoken language to printed text using the Dragon Dictation System.

## 2021-10-21 DIAGNOSIS — Z76.0 MEDICATION REFILL: ICD-10-CM

## 2021-10-21 DIAGNOSIS — G47.00 INSOMNIA, UNSPECIFIED TYPE: ICD-10-CM

## 2021-10-22 RX ORDER — TRAZODONE HYDROCHLORIDE 50 MG/1
50 TABLET ORAL
Qty: 30 TABLET | Refills: 0 | Status: SHIPPED | OUTPATIENT
Start: 2021-10-22 | End: 2021-11-23 | Stop reason: SDUPTHER

## 2021-11-08 PROBLEM — Z01.419 WELL WOMAN EXAM: Status: ACTIVE | Noted: 2021-11-08

## 2021-11-09 ENCOUNTER — OFFICE VISIT (OUTPATIENT)
Dept: OBSTETRICS AND GYNECOLOGY | Facility: CLINIC | Age: 51
End: 2021-11-09

## 2021-11-09 VITALS
DIASTOLIC BLOOD PRESSURE: 74 MMHG | WEIGHT: 203.8 LBS | HEIGHT: 61 IN | SYSTOLIC BLOOD PRESSURE: 122 MMHG | BODY MASS INDEX: 38.48 KG/M2

## 2021-11-09 DIAGNOSIS — Z98.890 HISTORY OF ENDOMETRIAL ABLATION: ICD-10-CM

## 2021-11-09 DIAGNOSIS — Z85.3 HISTORY OF BREAST CANCER IN FEMALE: ICD-10-CM

## 2021-11-09 DIAGNOSIS — N39.46 URINARY INCONTINENCE, MIXED: ICD-10-CM

## 2021-11-09 DIAGNOSIS — Z01.419 WELL WOMAN EXAM WITH ROUTINE GYNECOLOGICAL EXAM: Primary | ICD-10-CM

## 2021-11-09 PROCEDURE — 99386 PREV VISIT NEW AGE 40-64: CPT | Performed by: OBSTETRICS & GYNECOLOGY

## 2021-11-09 RX ORDER — MULTIVIT-MIN/IRON/FOLIC ACID/K 18-600-40
CAPSULE ORAL
COMMUNITY
Start: 2020-03-06

## 2021-11-09 RX ORDER — ASPIRIN 81 MG/1
TABLET ORAL
COMMUNITY
Start: 2021-10-21 | End: 2022-05-27

## 2021-11-09 RX ORDER — ROSUVASTATIN CALCIUM 40 MG/1
40 TABLET, COATED ORAL DAILY
COMMUNITY
Start: 2021-09-16

## 2021-11-09 RX ORDER — NITROGLYCERIN 0.4 MG/1
TABLET SUBLINGUAL
COMMUNITY
Start: 2021-10-21

## 2021-11-09 RX ORDER — CARVEDILOL 12.5 MG/1
12.5 TABLET ORAL 2 TIMES DAILY
COMMUNITY
Start: 2021-10-21

## 2021-11-09 NOTE — PROGRESS NOTES
Subjective   Chief Complaint   Patient presents with   • Establish Care     New pt.    • Gynecologic Exam     annual.      Teena Jacobson is a 51 y.o. year old  presenting to be seen for her annual exam.     SEXUAL Hx:  She is not currently sexually active.  In the past year there she has not been sexually active.    Condoms are not needed because she is not sexually active.  She would not like to be screened for STD's at today's exam.  Current birth control method: abstinence.  She is happy with her current method of contraception and does not want to discuss alternative methods of contraception.  MENSTRUAL Hx:  No LMP recorded. Patient has had an ablation.  In the past 6 months her cycles have been absent.  Her menstrual flow has been absent.   Each month on average there are roughly 0 day(s) of very heavy flow.    Intermenstrual bleeding is absent.    Post-coital bleeding is n/a.  Dysmenorrhea: none  PMS: none  Her cycles are not a source of concern for her that she wishes to discuss today.  HEALTH Hx:  She exercises regularly: yes.  She wears her seat belt: yes.  She has concerns about domestic violence: no.  OTHER THINGS SHE WANTS TO DISCUSS TODAY:  Nothing else    The following portions of the patient's history were reviewed and updated as appropriate:problem list, current medications, allergies, past family history, past medical history, past social history and past surgical history.    Social History    Tobacco Use      Smoking status: Never Smoker      Smokeless tobacco: Never Used    Review of Systems  Constitutional POS: nothing reported    NEG: anorexia or night sweats   Genitourinary POS: both stress and urge incontinence are  present but it IS NOT effecting her ADL's    NEG: dysuria or hematuria      Gastointestinal POS: nothing reported    NEG: bloating, change in bowel habits, melena or reflux symptoms   Integument POS: nothing reported    NEG: moles that are changing in size, shape,  "color or rashes   Breast POS: nothing reported    NEG: persistent breast lump, skin dimpling or nipple discharge        Objective   /74   Ht 154.9 cm (61\")   Wt 92.4 kg (203 lb 12.8 oz)   Breastfeeding No   BMI 38.51 kg/m²     General:  well developed; well nourished  no acute distress   Skin:  No suspicious lesions seen   Thyroid: normal to inspection and palpation   Breasts:  Examined in supine position  Symmetric without masses or skin dimpling  Nipples normal without inversion, lesions or discharge  There are no palpable axillary nodes   Abdomen: soft, non-tender; no masses  no umbilical or inguinal hernias are present  no hepato-splenomegaly   Pelvis: Clinical staff was present for exam  External genitalia:  normal appearance of the external genitalia including Bartholin's and Roy's glands.  :  urethral meatus normal;  Vaginal:  atrophic mucosal changes are present;  Cervix:  normal appearance.  Uterus:  normal size, shape and consistency.  Adnexa:  non palpable bilaterally.  Rectal:  digital rectal exam not performed; anus visually normal appearing.        Assessment   1. Normal GYN exam  2. History of D&C, endometrial ablation   3. History of breast cancer   4. 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  If she does not receive the results of the Pap within 2 weeks  time, she was instructed to call to find out the results.  I explained to Teena that the recommendations for Pap smear interval in a low risk patient's has lengthened to 5 years time if both cytology and HPV testing were normal.  I encouraged her to be seen yearly for a full physical exam including breast and pelvic exam even during the off years when PAP's will not be performed.  Colonosocopy UTD  DEXA at age 64   Breast imaging per oncologist at [facility]  Reviewed timed voids and kegel exercises  Reviewed the importance of exercise 2-3 times per week with at least 20-30 minutes of cardio  Request - last " mammograms and genetic screening results from [facility].  No prescription was given or electronically sent at today's visit  The importance of keeping all planned follow-up and taking all medications as prescribed was emphasized.  Follow up for annual exam in one year    No orders of the defined types were placed in this encounter.         This note was electronically signed.    Elke Epps MD  November 9, 2021    Note: Speech recognition transcription software may have been used to create portions of this document.  An attempt at proofreading has been made but errors in transcription could still be present.

## 2021-11-11 ENCOUNTER — PATIENT ROUNDING (BHMG ONLY) (OUTPATIENT)
Dept: OBSTETRICS AND GYNECOLOGY | Facility: CLINIC | Age: 51
End: 2021-11-11

## 2021-11-11 NOTE — PROGRESS NOTES
November 11, 2021    Hello, may I speak with Teenakaylin Jacobson?    My name is Marisol    I am  with [facility]  [address]  [address]  [facility] [address] [phone].    Before we get started may I verify your date of birth? 1970    I am calling to officially welcome you to our practice and ask about your recent visit. Is this a good time to talk? yes    Tell me about your visit with us. What things went well?  Everything was great.        We're always looking for ways to make our patients' experiences even better. Do you have recommendations on ways we may improve?  no    Overall were you satisfied with your first visit to our practice? yes       I appreciate you taking the time to speak with me today. Is there anything else I can do for you? no      Thank you, and have a great day.

## 2021-11-19 ENCOUNTER — TELEPHONE (OUTPATIENT)
Dept: FAMILY MEDICINE CLINIC | Facility: CLINIC | Age: 51
End: 2021-11-19

## 2021-11-19 NOTE — TELEPHONE ENCOUNTER
Caller: Teena Jacobson    Relationship: Self    Best call back number: [phone]     What was the call regarding: PATIENT STATES THAT SHE WOULD LIKE A CALL ABOUT WHY SHE NEEDS AN APPOINTMENT TO GET HER traZODone (DESYREL) 50 MG tablet REFILLED.    Do you require a callback: YES

## 2021-11-23 ENCOUNTER — OFFICE VISIT (OUTPATIENT)
Dept: FAMILY MEDICINE CLINIC | Facility: CLINIC | Age: 51
End: 2021-11-23

## 2021-11-23 DIAGNOSIS — E11.9 TYPE 2 DIABETES MELLITUS WITHOUT COMPLICATION, WITHOUT LONG-TERM CURRENT USE OF INSULIN (HCC): ICD-10-CM

## 2021-11-23 DIAGNOSIS — Z76.0 MEDICATION REFILL: ICD-10-CM

## 2021-11-23 DIAGNOSIS — I10 ESSENTIAL HYPERTENSION: Primary | ICD-10-CM

## 2021-11-23 DIAGNOSIS — G47.00 INSOMNIA, UNSPECIFIED TYPE: ICD-10-CM

## 2021-11-23 PROCEDURE — 99214 OFFICE O/P EST MOD 30 MIN: CPT | Performed by: NURSE PRACTITIONER

## 2021-11-23 RX ORDER — SITAGLIPTIN AND METFORMIN HYDROCHLORIDE 500; 50 MG/1; MG/1
1 TABLET, FILM COATED ORAL 2 TIMES DAILY WITH MEALS
Qty: 60 TABLET | Refills: 5 | Status: SHIPPED | OUTPATIENT
Start: 2021-11-23 | End: 2022-07-08

## 2021-11-23 RX ORDER — TRAZODONE HYDROCHLORIDE 50 MG/1
50 TABLET ORAL
Qty: 30 TABLET | Refills: 5 | Status: SHIPPED | OUTPATIENT
Start: 2021-11-23 | End: 2022-05-27

## 2021-11-24 LAB
ALBUMIN SERPL-MCNC: 4.6 G/DL (ref 3.5–5.2)
ALBUMIN/GLOB SERPL: 1.9 G/DL
ALP SERPL-CCNC: 84 U/L (ref 39–117)
ALT SERPL-CCNC: 22 U/L (ref 1–33)
AST SERPL-CCNC: 20 U/L (ref 1–32)
BASOPHILS # BLD AUTO: 0.05 10*3/MM3 (ref 0–0.2)
BASOPHILS NFR BLD AUTO: 0.7 % (ref 0–1.5)
BILIRUB SERPL-MCNC: 0.3 MG/DL (ref 0–1.2)
BUN SERPL-MCNC: 13 MG/DL (ref 6–20)
BUN/CREAT SERPL: 15.5 (ref 7–25)
CALCIUM SERPL-MCNC: 9.4 MG/DL (ref 8.6–10.5)
CHLORIDE SERPL-SCNC: 101 MMOL/L (ref 98–107)
CO2 SERPL-SCNC: 27.3 MMOL/L (ref 22–29)
CREAT SERPL-MCNC: 0.84 MG/DL (ref 0.57–1)
EOSINOPHIL # BLD AUTO: 0.24 10*3/MM3 (ref 0–0.4)
EOSINOPHIL NFR BLD AUTO: 3.4 % (ref 0.3–6.2)
ERYTHROCYTE [DISTWIDTH] IN BLOOD BY AUTOMATED COUNT: 13.7 % (ref 12.3–15.4)
GLOBULIN SER CALC-MCNC: 2.4 GM/DL
GLUCOSE SERPL-MCNC: 109 MG/DL (ref 65–99)
HBA1C MFR BLD: 6.49 % (ref 4.8–5.6)
HCT VFR BLD AUTO: 38.1 % (ref 34–46.6)
HGB BLD-MCNC: 12.6 G/DL (ref 12–15.9)
IMM GRANULOCYTES # BLD AUTO: 0.01 10*3/MM3 (ref 0–0.05)
IMM GRANULOCYTES NFR BLD AUTO: 0.1 % (ref 0–0.5)
LYMPHOCYTES # BLD AUTO: 2.84 10*3/MM3 (ref 0.7–3.1)
LYMPHOCYTES NFR BLD AUTO: 40.7 % (ref 19.6–45.3)
MCH RBC QN AUTO: 27.6 PG (ref 26.6–33)
MCHC RBC AUTO-ENTMCNC: 33.1 G/DL (ref 31.5–35.7)
MCV RBC AUTO: 83.6 FL (ref 79–97)
MONOCYTES # BLD AUTO: 0.65 10*3/MM3 (ref 0.1–0.9)
MONOCYTES NFR BLD AUTO: 9.3 % (ref 5–12)
NEUTROPHILS # BLD AUTO: 3.19 10*3/MM3 (ref 1.7–7)
NEUTROPHILS NFR BLD AUTO: 45.8 % (ref 42.7–76)
NRBC BLD AUTO-RTO: 0.1 /100 WBC (ref 0–0.2)
PLATELET # BLD AUTO: 253 10*3/MM3 (ref 140–450)
POTASSIUM SERPL-SCNC: 4.1 MMOL/L (ref 3.5–5.2)
PROT SERPL-MCNC: 7 G/DL (ref 6–8.5)
RBC # BLD AUTO: 4.56 10*6/MM3 (ref 3.77–5.28)
SODIUM SERPL-SCNC: 139 MMOL/L (ref 136–145)
TSH SERPL DL<=0.005 MIU/L-ACNC: 2.22 UIU/ML (ref 0.27–4.2)
WBC # BLD AUTO: 6.98 10*3/MM3 (ref 3.4–10.8)

## 2021-11-24 RX ORDER — TRETINOIN 1 MG/G
CREAM TOPICAL
COMMUNITY
Start: 2021-11-15

## 2021-11-25 NOTE — PATIENT INSTRUCTIONS
Managing Your Hypertension  Hypertension, also called high blood pressure, is when the force of the blood pressing against the walls of the arteries is too strong. Arteries are blood vessels that carry blood from your heart throughout your body. Hypertension forces the heart to work harder to pump blood and may cause the arteries to become narrow or stiff.  Understanding blood pressure readings  Your personal target blood pressure may vary depending on your medical conditions, your age, and other factors. A blood pressure reading includes a higher number over a lower number. Ideally, your blood pressure should be below 120/80. You should know that:  · The first, or top, number is called the systolic pressure. It is a measure of the pressure in your arteries as your heart beats.  · The second, or bottom number, is called the diastolic pressure. It is a measure of the pressure in your arteries as the heart relaxes.  Blood pressure is classified into four stages. Based on your blood pressure reading, your health care provider may use the following stages to determine what type of treatment you need, if any. Systolic pressure and diastolic pressure are measured in a unit called mmHg.  Normal  · Systolic pressure: below 120.  · Diastolic pressure: below 80.  Elevated  · Systolic pressure: 120-129.  · Diastolic pressure: below 80.  Hypertension stage 1  · Systolic pressure: 130-139.  · Diastolic pressure: 80-89.  Hypertension stage 2  · Systolic pressure: 140 or above.  · Diastolic pressure: 90 or above.  How can this condition affect me?  Managing your hypertension is an important responsibility. Over time, hypertension can damage the arteries and decrease blood flow to important parts of the body, including the brain, heart, and kidneys. Having untreated or uncontrolled hypertension can lead to:  · A heart attack.  · A stroke.  · A weakened blood vessel (aneurysm).  · Heart failure.  · Kidney damage.  · Eye  damage.  · Metabolic syndrome.  · Memory and concentration problems.  · Vascular dementia.  What actions can I take to manage this condition?  Hypertension can be managed by making lifestyle changes and possibly by taking medicines. Your health care provider will help you make a plan to bring your blood pressure within a normal range.  Nutrition    · Eat a diet that is high in fiber and potassium, and low in salt (sodium), added sugar, and fat. An example eating plan is called the Dietary Approaches to Stop Hypertension (DASH) diet. To eat this way:  ? Eat plenty of fresh fruits and vegetables. Try to fill one-half of your plate at each meal with fruits and vegetables.  ? Eat whole grains, such as whole-wheat pasta, brown rice, or whole-grain bread. Fill about one-fourth of your plate with whole grains.  ? Eat low-fat dairy products.  ? Avoid fatty cuts of meat, processed or cured meats, and poultry with skin. Fill about one-fourth of your plate with lean proteins such as fish, chicken without skin, beans, eggs, and tofu.  ? Avoid pre-made and processed foods. These tend to be higher in sodium, added sugar, and fat.  · Reduce your daily sodium intake. Most people with hypertension should eat less than 1,500 mg of sodium a day.    Lifestyle    · Work with your health care provider to maintain a healthy body weight or to lose weight. Ask what an ideal weight is for you.  · Get at least 30 minutes of exercise that causes your heart to beat faster (aerobic exercise) most days of the week. Activities may include walking, swimming, or biking.  · Include exercise to strengthen your muscles (resistance exercise), such as weight lifting, as part of your weekly exercise routine. Try to do these types of exercises for 30 minutes at least 3 days a week.  · Do not use any products that contain nicotine or tobacco, such as cigarettes, e-cigarettes, and chewing tobacco. If you need help quitting, ask your health care  provider.  · Control any long-term (chronic) conditions you have, such as high cholesterol or diabetes.  · Identify your sources of stress and find ways to manage stress. This may include meditation, deep breathing, or making time for fun activities.    Alcohol use  · Do not drink alcohol if:  ? Your health care provider tells you not to drink.  ? You are pregnant, may be pregnant, or are planning to become pregnant.  · If you drink alcohol:  ? Limit how much you use to:  § 0-1 drink a day for women.  § 0-2 drinks a day for men.  ? Be aware of how much alcohol is in your drink. In the [place] one drink equals one 12 oz bottle of beer (355 mL), one 5 oz glass of wine (148 mL), or one 1½ oz glass of hard liquor (44 mL).  Medicines  Your health care provider may prescribe medicine if lifestyle changes are not enough to get your blood pressure under control and if:  · Your systolic blood pressure is 130 or higher.  · Your diastolic blood pressure is 80 or higher.  Take medicines only as told by your health care provider. Follow the directions carefully. Blood pressure medicines must be taken as told by your health care provider. The medicine does not work as well when you skip doses. Skipping doses also puts you at risk for problems.  Monitoring  Before you monitor your blood pressure:  · Do not smoke, drink caffeinated beverages, or exercise within 30 minutes before taking a measurement.  · Use the bathroom and empty your bladder (urinate).  · Sit quietly for at least 5 minutes before taking measurements.  Monitor your blood pressure at home as told by your health care provider. To do this:  · Sit with your back straight and supported.  · Place your feet flat on the floor. Do not cross your legs.  · Support your arm on a flat surface, such as a table. Make sure your upper arm is at heart level.  · Each time you measure, take two or three readings one minute apart and record the results.  You may also need to have your  blood pressure checked regularly by your health care provider.  General information  · Talk with your health care provider about your diet, exercise habits, and other lifestyle factors that may be contributing to hypertension.  · Review all the medicines you take with your health care provider because there may be side effects or interactions.  · Keep all visits as told by your health care provider. Your health care provider can help you create and adjust your plan for managing your high blood pressure.  Where to find more information  · National Heart, Lung, and Blood [place]: www.nhlbi.nih.gov  · American Heart Association: www.heart.org  Contact a health care provider if:  · You think you are having a reaction to medicines you have taken.  · You have repeated (recurrent) headaches.  · You feel dizzy.  · You have swelling in your ankles.  · You have trouble with your vision.  Get help right away if:  · You develop a severe headache or confusion.  · You have unusual weakness or numbness, or you feel faint.  · You have severe pain in your chest or abdomen.  · You vomit repeatedly.  · You have trouble breathing.  These symptoms may represent a serious problem that is an emergency. Do not wait to see if the symptoms will go away. Get medical help right away. Call your local emergency services (911 in the [place] Do not drive yourself to the hospital.  Summary  · Hypertension is when the force of blood pumping through your arteries is too strong. If this condition is not controlled, it may put you at risk for serious complications.  · Your personal target blood pressure may vary depending on your medical conditions, your age, and other factors. For most people, a normal blood pressure is less than 120/80.  · Hypertension is managed by lifestyle changes, medicines, or both.  · Lifestyle changes to help manage hypertension include losing weight, eating a healthy, low-sodium diet, exercising more, stopping smoking, and  limiting alcohol.  This information is not intended to replace advice given to you by your health care provider. Make sure you discuss any questions you have with your health care provider.  Document Revised: 01/22/2021 Document Reviewed: 11/17/2020  [employer] Patient Education © 2021 [employer]    Diabetes Mellitus and Nutrition, Adult  When you have diabetes, or diabetes mellitus, it is very important to have healthy eating habits because your blood sugar (glucose) levels are greatly affected by what you eat and drink. Eating healthy foods in the right amounts, at about the same times every day, can help you:  · Control your blood glucose.  · Lower your risk of heart disease.  · Improve your blood pressure.  · Reach or maintain a healthy weight.  What can affect my meal plan?  Every person with diabetes is different, and each person has different needs for a meal plan. Your health care provider may recommend that you work with a dietitian to make a meal plan that is best for you. Your meal plan may vary depending on factors such as:  · The calories you need.  · The medicines you take.  · Your weight.  · Your blood glucose, blood pressure, and cholesterol levels.  · Your activity level.  · Other health conditions you have, such as heart or kidney disease.  How do carbohydrates affect me?  Carbohydrates, also called carbs, affect your blood glucose level more than any other type of food. Eating carbs naturally raises the amount of glucose in your blood. Carb counting is a method for keeping track of how many carbs you eat. Counting carbs is important to keep your blood glucose at a healthy level, especially if you use insulin or take certain oral diabetes medicines.  It is important to know how many carbs you can safely have in each meal. This is different for every person. Your dietitian can help you calculate how many carbs you should have at each meal and for each snack.  How does alcohol affect me?  Alcohol  "can cause a sudden decrease in blood glucose (hypoglycemia), especially if you use insulin or take certain oral diabetes medicines. Hypoglycemia can be a life-threatening condition. Symptoms of hypoglycemia, such as sleepiness, dizziness, and confusion, are similar to symptoms of having too much alcohol.  · Do not drink alcohol if:  ? Your health care provider tells you not to drink.  ? You are pregnant, may be pregnant, or are planning to become pregnant.  · If you drink alcohol:  ? Do not drink on an empty stomach.  ? Limit how much you use to:  § 0-1 drink a day for women.  § 0-2 drinks a day for men.  ? Be aware of how much alcohol is in your drink. In the [place] one drink equals one 12 oz bottle of beer (355 mL), one 5 oz glass of wine (148 mL), or one 1½ oz glass of hard liquor (44 mL).  ? Keep yourself hydrated with water, diet soda, or unsweetened iced tea.  § Keep in mind that regular soda, juice, and other mixers may contain a lot of sugar and must be counted as carbs.  What are tips for following this plan?    Reading food labels  · Start by checking the serving size on the \"Nutrition Facts\" label of packaged foods and drinks. The amount of calories, carbs, fats, and other nutrients listed on the label is based on one serving of the item. Many items contain more than one serving per package.  · Check the total grams (g) of carbs in one serving. You can calculate the number of servings of carbs in one serving by dividing the total carbs by 15. For example, if a food has 30 g of total carbs per serving, it would be equal to 2 servings of carbs.  · Check the number of grams (g) of saturated fats and trans fats in one serving. Choose foods that have a low amount or none of these fats.  · Check the number of milligrams (mg) of salt (sodium) in one serving. Most people should limit total sodium intake to less than 2,300 mg per day.  · Always check the nutrition information of foods labeled as \"low-fat\" or " "\"nonfat.\" These foods may be higher in added sugar or refined carbs and should be avoided.  · Talk to your dietitian to identify your daily goals for nutrients listed on the label.  Shopping  · Avoid buying canned, pre-made, or processed foods. These foods tend to be high in fat, sodium, and added sugar.  · Shop around the outside edge of the grocery store. This is where you will most often find fresh fruits and vegetables, bulk grains, fresh meats, and fresh dairy.  Cooking  · Use low-heat cooking methods, such as baking, instead of high-heat cooking methods like deep frying.  · Cook using healthy oils, such as olive, canola, or sunflower oil.  · Avoid cooking with butter, cream, or high-fat meats.  Meal planning  · Eat meals and snacks regularly, preferably at the same times every day. Avoid going long periods of time without eating.  · Eat foods that are high in fiber, such as fresh fruits, vegetables, beans, and whole grains. Talk with your dietitian about how many servings of carbs you can eat at each meal.  · Eat 4-6 oz (112-168 g) of lean protein each day, such as lean meat, chicken, fish, eggs, or tofu. One ounce (oz) of lean protein is equal to:  ? 1 oz (28 g) of meat, chicken, or fish.  ? 1 egg.  ? ¼ cup (62 g) of tofu.  · Eat some foods each day that contain healthy fats, such as avocado, nuts, seeds, and fish.  What foods should I eat?  Fruits  Berries. Apples. Oranges. Peaches. Apricots. Plums. Grapes. [place]. Papaya. Pomegranate. Kiwi. Cherries.  Vegetables  Lettuce. Spinach. Leafy greens, including kale, chard, gillian greens, and mustard greens. Beets. Cauliflower. Cabbage. Broccoli. Carrots. Green beans. Tomatoes. Peppers. Onions. Cucumbers. [place] sprouts.  Grains  Whole grains, such as whole-wheat or whole-grain bread, crackers, tortillas, cereal, and pasta. Unsweetened oatmeal. Quinoa. Brown or wild rice.  Meats and other proteins  Seafood. Poultry without skin. Lean cuts of poultry and beef. " Tofu. Nuts. Seeds.  Dairy  Low-fat or fat-free dairy products such as milk, yogurt, and cheese.  The items listed above may not be a complete list of foods and beverages you can eat. Contact a dietitian for more information.  What foods should I avoid?  Fruits  Fruits canned with syrup.  Vegetables  Canned vegetables. Frozen vegetables with butter or cream sauce.  Grains  Refined white flour and flour products such as bread, pasta, snack foods, and cereals. Avoid all processed foods.  Meats and other proteins  Fatty cuts of meat. Poultry with skin. Breaded or fried meats. Processed meat. Avoid saturated fats.  Dairy  Full-fat yogurt, cheese, or milk.  Beverages  Sweetened drinks, such as soda or iced tea.  The items listed above may not be a complete list of foods and beverages you should avoid. Contact a dietitian for more information.  Questions to ask a health care provider  · Do I need to meet with a diabetes educator?  · Do I need to meet with a dietitian?  · What number can I call if I have questions?  · When are the best times to check my blood glucose?  Where to find more information:  · American Diabetes Association: diabetes.org  · Academy of Nutrition and Dietetics: www.eatright.org  · National [place] of Diabetes and Digestive and Kidney Diseases: www.niddk.nih.gov  · Association of Diabetes Care and Education Specialists: www.diabeteseducator.org  Summary  · It is important to have healthy eating habits because your blood sugar (glucose) levels are greatly affected by what you eat and drink.  · A healthy meal plan will help you control your blood glucose and maintain a healthy lifestyle.  · Your health care provider may recommend that you work with a dietitian to make a meal plan that is best for you.  · Keep in mind that carbohydrates (carbs) and alcohol have immediate effects on your blood glucose levels. It is important to count carbs and to use alcohol carefully.  This information is not intended  to replace advice given to you by your health care provider. Make sure you discuss any questions you have with your health care provider.  Document Revised: 11/24/2020 Document Reviewed: 11/24/2020  [employer] Patient Education © 2021 [employer]    Diabetes Mellitus and Standards of Medical Care  Living with and managing diabetes (diabetes mellitus) can be complicated. Your diabetes treatment may be managed by a team of health care providers, including:  · A physician who specializes in diabetes (endocrinologist). You might also have visits with a nurse practitioner or physician assistant.  · Nurses.  · A registered dietitian.  · A certified diabetes care and .  · An exercise specialist.  · A pharmacist.  · An eye doctor.  · A foot specialist (podiatrist).  · A dental care provider.  · A primary care provider.  · A mental health care provider.  How to manage your diabetes  You can do many things to successfully manage your diabetes. Your health care providers will follow guidelines to help you get the best quality of care. Here are general guidelines for your diabetes management plan. Your health care providers may give you more specific instructions.  Physical exams  When you are diagnosed with diabetes, and each year after that, your health care provider will ask about your medical and family history. You will have a physical exam, which may include:  · Measuring your height, weight, and body mass index (BMI).  · Checking your blood pressure. This will be done at every routine medical visit. Your target blood pressure may vary depending on your medical conditions, your age, and other factors.  · A thyroid exam.  · A skin exam.  · Screening for nerve damage (peripheral neuropathy). This may include checking the pulse in your legs and feet and the level of sensation in your hands and feet.  · A foot exam to inspect the structure and skin of your feet, including checking for cuts, bruises,  redness, blisters, sores, or other problems.  · Screening for blood vessel (vascular) problems. This may include checking the pulse in your legs and feet and checking your temperature.  Blood tests  Depending on your treatment plan and your personal needs, you may have the following tests:  · Hemoglobin A1C (HbA1C). This test provides information about blood sugar (glucose) control over the previous 2-3 months. It is used to adjust your treatment plan, if needed. This test will be done:  ? At least 2 times a year, if you are meeting your treatment goals.  ? 4 times a year, if you are not meeting your treatment goals or if your goals have changed.  · Lipid testing, including total cholesterol, LDL and HDL cholesterol, and triglyceride levels.  ? The goal for LDL is less than 100 mg/dL (5.5 mmol/L). If you are at high risk for complications, the goal is less than 70 mg/dL (3.9 mmol/L).  ? The goal for HDL is 40 mg/dL (2.2 mmol/L) or higher for men, and 50 mg/dL (2.8 mmol/L) or higher for women. An HDL cholesterol of 60 mg/dL (3.3 mmol/L) or higher gives some protection against heart disease.  ? The goal for triglycerides is less than 150 mg/dL (8.3 mmol/L).  · Liver function tests.  · Kidney function tests.  · Thyroid function tests.    Dental and eye exams    · Visit your dentist two times a year.  · If you have type 1 diabetes, your health care provider may recommend an eye exam within 5 years after you are diagnosed, and then once a year after your first exam.  ? For children with type 1 diabetes, the health care provider may recommend an eye exam when your child is age 11 or older and has had diabetes for 3-5 years. After the first exam, your child should get an eye exam once a year.  · If you have type 2 diabetes, your health care provider may recommend an eye exam as soon as you are diagnosed, and then every 1-2 years after your first exam.    Immunizations  · A yearly flu (influenza) vaccine is recommended  annually for everyone 6 months or older. This is especially important if you have diabetes.  · The pneumonia (pneumococcal) vaccine is recommended for everyone 2 years or older who has diabetes. If you are age 65 or older, you may get the pneumonia vaccine as a series of two separate shots.  · The hepatitis B vaccine is recommended for adults shortly after being diagnosed with diabetes.  Adults and children with diabetes should receive all other vaccines according to age-specific recommendations from the Centers for Disease Control and Prevention (CDC).  Mental and emotional health  Screening for symptoms of eating disorders, anxiety, and depression is recommended at the time of diagnosis and after as needed. If your screening shows that you have symptoms, you may need more evaluation. You may work with a mental health care provider.  Follow these instructions at home:  Treatment plan  You will monitor your blood glucose levels and may give yourself insulin. Your treatment plan will be reviewed at every medical visit. You and your health care provider will discuss:  · How you are taking your medicines, including insulin.  · Any side effects you have.  · Your blood glucose level target goals.  · How often you monitor your blood glucose level.  · Lifestyle habits, such as activity level and tobacco, alcohol, and substance use.  Education  Your health care provider will assess how well you are monitoring your blood glucose levels and whether you are taking your insulin and medicines correctly. He or she may refer you to:  · A certified diabetes care and  to manage your diabetes throughout your life, starting at diagnosis.  · A registered dietitian who can create and review your personal nutrition plan.  · An exercise specialist who can discuss your activity level and exercise plan.  General instructions  · Take over-the-counter and prescription medicines only as told by your health care  provider.  · Keep all follow-up visits. This is important.  Where to find support  There are many diabetes support networks, including:  · American Diabetes Association (ADA): diabetes.org  · Defeat Diabetes Foundation: defeatdiabetes.org  Where to find more information  · American Diabetes Association (ADA): www.diabetes.org  · Association of Diabetes Care & Education Specialists (ADCES): diabeteseducator.org  · International Diabetes Federation (IDF): idf.org  Summary  · Managing diabetes (diabetes mellitus) can be complicated. Your diabetes treatment may be managed by a team of health care providers.  · Your health care providers follow guidelines to help you get the best quality care.  · You should have physical exams, blood tests, blood pressure monitoring, immunizations, and screening tests regularly. Stay updated on how to manage your diabetes.  · Your health care providers may also give you more specific instructions based on your individual health.  This information is not intended to replace advice given to you by your health care provider. Make sure you discuss any questions you have with your health care provider.  Document Revised: 06/24/2021 Document Reviewed: 06/24/2021  [employer] Patient Education © 2021 [employer]    Type 2 Diabetes Mellitus, Self-Care, Adult  Caring for yourself after you have been diagnosed with type 2 diabetes (type 2 diabetes mellitus) means keeping your blood sugar (glucose) under control with a balance of:  · Nutrition.  · Exercise.  · Lifestyle changes.  · Medicines or insulin, if needed.  · Support from your team of health care providers and others.  The following information explains what you need to know to manage your diabetes at home.  What are the risks?  Having diabetes can put you at risk for other long-term (chronic) conditions, such as heart disease and kidney disease. Your health care provider may prescribe medicines to help prevent complications from  diabetes.  How to monitor blood glucose    · Check your blood glucose every day, as often as told by your health care provider.  · Have your A1C (hemoglobin A1C) level checked two or more times a year, or as often as told by your health care provider.  · Your health care provider will set personalized treatment goals for you. Generally, the goal of treatment is to maintain the following blood glucose levels:  ? Before meals:  mg/dL (4.4-7.2 mmol/L).  ? After meals: below 180 mg/dL (10 mmol/L).  ? A1C level: less than 7%.  How to manage hyperglycemia and hypoglycemia  Hyperglycemia symptoms  Hyperglycemia, also called high blood glucose, occurs when blood glucose is too high. Make sure you know the early signs of hyperglycemia, such as:  · Increased thirst.  · Hunger.  · Feeling very tired.  · Needing to urinate more often than usual.  · Blurry vision.  Hypoglycemia symptoms  Hypoglycemia, also called low blood glucose, occurs with a blood glucose level at or below 70 mg/dL (3.9 mmol/L). Diabetes medicines lower your blood glucose and can cause hypoglycemia. The risk for hypoglycemia increases during or after exercise, during sleep, during illness, and when skipping meals or not eating for a long time (fasting).  It is important to know the symptoms of hypoglycemia and treat it right away. Always have a 15-gram rapid-acting carbohydrate snack with you to treat low blood glucose. Family members and close friends should also know the symptoms and understand how to treat hypoglycemia, in case you are not able to treat yourself. Symptoms may include:  · Hunger.  · Anxiety.  · Sweating and feeling clammy.  · Dizziness or feeling light-headed.  · Sleepiness.  · Increased heart rate.  · Irritability.  · Tingling or numbness around the mouth, lips, or tongue.  · Restless sleep.  Severe hypoglycemia is when your blood glucose level is at or below 54 mg/dL (3 mmol/L). Severe hypoglycemia is an emergency. Do not wait to  see if the symptoms will go away. Get medical help right away. Call your local emergency services (911 in the [place] Do not drive yourself to the hospital.  If you have severe hypoglycemia and you cannot eat or drink, you may need glucagon. A family member or close friend should learn how to check your blood glucose and how to give you glucagon. Ask your health care provider if you need to have an emergency glucagon kit available.  Follow these instructions at home:  Medicines  · Take diabetes medicines as told by your health care provider. If your health care provider prescribed insulin or diabetes medicines, take them every day.  · Do not run out of insulin or other diabetes medicines. Plan ahead so you always have these available.  · If you use insulin, adjust your dosage based on your physical activity and what foods you eat. Your health care provider will tell you how to adjust your dosage.  · Take over-the-counter and prescription medicines only as told by your health care provider.  Eating and drinking    What you eat and drink affects your blood glucose and your insulin dosage. Making good choices helps to control your diabetes and prevent other health problems. A healthy meal plan includes eating lean proteins, complex carbohydrates, fresh fruits and vegetables, low-fat dairy products, and healthy fats.  Make an appointment to see a registered dietitian to help you create an eating plan that is right for you. Make sure that you:  · Follow instructions from your health care provider about eating or drinking restrictions.  · Drink enough fluid to keep your urine pale yellow.  · Keep a record of the carbohydrates that you eat. Do this by reading food labels and learning the standard serving sizes of foods.  · Follow your sick-day plan whenever you cannot eat or drink as usual. Make this plan in advance with your health care provider.    Activity  · Stay active. Exercise regularly, as told by your health care  provider. This may include:  ? Stretching and doing strength exercises, such as yoga or weight lifting, 2 or more times a week.  ? Doing 150 minutes or more of moderate-intensity or vigorous-intensity exercise each week. This could be brisk walking, biking, or water aerobics.  § Spread out your activity over 3 or more days of the week.  § Do not go more than 2 days in a row without doing some kind of physical activity.  · When you start a new exercise or activity, work with your health care provider to adjust your insulin, medicines, or food intake as needed.  Lifestyle  · Do not use any products that contain nicotine or tobacco, such as cigarettes, e-cigarettes, and chewing tobacco. If you need help quitting, ask your health care provider.  · If your health care provider says that alcohol is safe for you, limit how much you use to no more than 1 drink a day for women who are not pregnant and 2 drinks a day for men. In the [place] one drink equals one 12 oz bottle of beer (355 mL), one 5 oz glass of wine (148 mL), or one 1½ oz glass of hard liquor (44 mL).  · Learn to manage stress. If you need help with this, ask your health care provider.  Take care of your body    · Keep your immunizations up to date. In addition to getting vaccinations as told by your health care provider, it is recommended that you get vaccinated against the following illnesses:  ? The flu (influenza). Get a flu shot every year.  ? Pneumonia.  ? Hepatitis B.  · Schedule an eye exam soon after your diagnosis, and then one time every year after that.  · Check your skin and feet every day for cuts, bruises, redness, blisters, or sores. Schedule a foot exam with your health care provider once every year.  · Brush your teeth and gums two times a day, and floss one or more times a day. Visit your dentist one or more times every 6 months.  · Maintain a healthy weight.    General instructions  · Share your diabetes management plan with people in your  workplace, school, and household.  · Carry a medical alert card or wear medical alert jewelry.  · Keep all follow-up visits as told by your health care provider. This is important.  Questions to ask your health care provider  · Should I meet with a certified diabetes care and ?  · Where can I find a support group for people with diabetes?  Where to find more information  · American Diabetes Association (ADA): www.diabetes.org  · American Association of Diabetes Care and Education Specialists (ADCES): www.diabeteseducator.org  · International Diabetes Federation (IDF): www.idf.org  Summary  · Caring for yourself after you have been diagnosed with type 2 diabetes (type 2 diabetes mellitus) means keeping your blood sugar (glucose) under control with a balance of nutrition, exercise, lifestyle changes, and medicine.  · Check your blood glucose every day, as often as told by your health care provider.  · Having diabetes can put you at risk for other long-term (chronic) conditions, such as heart disease and kidney disease. Your health care provider may prescribe medicines to help prevent complications from diabetes.  · Share your diabetes management plan with people in your workplace, school, and household.  · Keep all follow-up visits as told by your health care provider. This is important.  This information is not intended to replace advice given to you by your health care provider. Make sure you discuss any questions you have with your health care provider.  Document Revised: 01/25/2021 Document Reviewed: 01/26/2021  [employer] Patient Education © 2021 [employer]    Insomnia  Insomnia is a sleep disorder that makes it difficult to fall asleep or stay asleep. Insomnia can cause fatigue, low energy, difficulty concentrating, mood swings, and poor performance at work or school.  There are three different ways to classify insomnia:  · Difficulty falling asleep.  · Difficulty staying asleep.  · Waking  up too early in the morning.  Any type of insomnia can be long-term (chronic) or short-term (acute). Both are common. Short-term insomnia usually lasts for three months or less. Chronic insomnia occurs at least three times a week for longer than three months.  What are the causes?  Insomnia may be caused by another condition, situation, or substance, such as:  · Anxiety.  · Certain medicines.  · Gastroesophageal reflux disease (GERD) or other gastrointestinal conditions.  · Asthma or other breathing conditions.  · Restless legs syndrome, sleep apnea, or other sleep disorders.  · Chronic pain.  · Menopause.  · Stroke.  · Abuse of alcohol, tobacco, or illegal drugs.  · Mental health conditions, such as depression.  · Caffeine.  · Neurological disorders, such as Alzheimer's disease.  · An overactive thyroid (hyperthyroidism).  Sometimes, the cause of insomnia may not be known.  What increases the risk?  Risk factors for insomnia include:  · Gender. Women are affected more often than men.  · Age. Insomnia is more common as you get older.  · Stress.  · Lack of exercise.  · Irregular work schedule or working night shifts.  · Traveling between different time zones.  · Certain medical and mental health conditions.  What are the signs or symptoms?  If you have insomnia, the main symptom is having trouble falling asleep or having trouble staying asleep. This may lead to other symptoms, such as:  · Feeling fatigued or having low energy.  · Feeling nervous about going to sleep.  · Not feeling rested in the morning.  · Having trouble concentrating.  · Feeling irritable, anxious, or depressed.  How is this diagnosed?  This condition may be diagnosed based on:  · Your symptoms and medical history. Your health care provider may ask about:  ? Your sleep habits.  ? Any medical conditions you have.  ? Your mental health.  · A physical exam.  How is this treated?  Treatment for insomnia depends on the cause. Treatment may focus on  treating an underlying condition that is causing insomnia. Treatment may also include:  · Medicines to help you sleep.  · Counseling or therapy.  · Lifestyle adjustments to help you sleep better.  Follow these instructions at home:  Eating and drinking    · Limit or avoid alcohol, caffeinated beverages, and cigarettes, especially close to bedtime. These can disrupt your sleep.  · Do not eat a large meal or eat spicy foods right before bedtime. This can lead to digestive discomfort that can make it hard for you to sleep.    Sleep habits    · Keep a sleep diary to help you and your health care provider figure out what could be causing your insomnia. Write down:  ? When you sleep.  ? When you wake up during the night.  ? How well you sleep.  ? How rested you feel the next day.  ? Any side effects of medicines you are taking.  ? What you eat and drink.  · Make your bedroom a dark, comfortable place where it is easy to fall asleep.  ? Put up shades or blackout curtains to block light from outside.  ? Use a white noise machine to block noise.  ? Keep the temperature cool.  · Limit screen use before bedtime. This includes:  ? Watching TV.  ? Using your smartphone, tablet, or computer.  · Stick to a routine that includes going to bed and waking up at the same times every day and night. This can help you fall asleep faster. Consider making a quiet activity, such as reading, part of your nighttime routine.  · Try to avoid taking naps during the day so that you sleep better at night.  · Get out of bed if you are still awake after 15 minutes of trying to sleep. Keep the lights down, but try reading or doing a quiet activity. When you feel sleepy, go back to bed.    General instructions  · Take over-the-counter and prescription medicines only as told by your health care provider.  · Exercise regularly, as told by your health care provider. Avoid exercise starting several hours before bedtime.  · Use relaxation techniques to  manage stress. Ask your health care provider to suggest some techniques that may work well for you. These may include:  ? Breathing exercises.  ? Routines to release muscle tension.  ? Visualizing peaceful scenes.  · Make sure that you drive carefully. Avoid driving if you feel very sleepy.  · Keep all follow-up visits as told by your health care provider. This is important.  Contact a health care provider if:  · You are tired throughout the day.  · You have trouble in your daily routine due to sleepiness.  · You continue to have sleep problems, or your sleep problems get worse.  Get help right away if:  · You have serious thoughts about hurting yourself or someone else.  If you ever feel like you may hurt yourself or others, or have thoughts about taking your own life, get help right away. You can go to your nearest emergency department or call:  · Your local emergency services (911 in the [place]  · A suicide crisis helpline, such as the National Suicide Prevention Lifeline at 1-228.624.4635. This is open 24 hours a day.  Summary  · Insomnia is a sleep disorder that makes it difficult to fall asleep or stay asleep.  · Insomnia can be long-term (chronic) or short-term (acute).  · Treatment for insomnia depends on the cause. Treatment may focus on treating an underlying condition that is causing insomnia.  · Keep a sleep diary to help you and your health care provider figure out what could be causing your insomnia.  This information is not intended to replace advice given to you by your health care provider. Make sure you discuss any questions you have with your health care provider.  Document Revised: 11/30/2018 Document Reviewed: 09/27/2018  Elsevier Patient Education © 2021 Elsevier Inc.

## 2021-11-25 NOTE — PROGRESS NOTES
Follow Up Office Note     Patient Name: Teena Jacobson  : 1970   MRN: [id number]     Chief Complaint:    Chief Complaint   Patient presents with   • Hypertension   • Diabetes       History of Present Illness: Teena Jacobson is a 51 y.o. female who presents today for re-evaluation/management chronic HTN and DM2 both of which have been stable and controlled on current medications. Patient is here for labs today and medication refills.    Subjective      Review of Systems:   Review of Systems   Constitutional: Negative.    HENT: Negative.    Respiratory: Negative.  Negative for chest tightness and shortness of breath.    Cardiovascular: Negative for chest pain, palpitations and leg swelling.   Neurological: Negative.         Past Medical History:   Past Medical History:   Diagnosis Date   • Anxiety    • Cancer (HCC)     breast, chemo, radiation done   • Depression    • Diabetes mellitus (HCC)    • Fibroid    • H/O mastectomy    • Hypertension        No LMP recorded. Patient has had an ablation.     Medications:     Current Outpatient Medications:   •  ACCU-CHEK FASTCLIX LANCETS misc, USE TO TEST BLOOD GLUCOSE THREE TIMES DAILY, Disp: , Rfl: 11  •  aspirin (Emy Aspirin EC Low Dose) 81 MG EC tablet, , Disp: , Rfl:   •  carvedilol (COREG) 12.5 MG tablet, Take 12.5 mg by mouth 2 (Two) Times a Day., Disp: , Rfl:   •  cetirizine (zyrTEC) 10 MG tablet, Take 1 tablet by mouth Daily., Disp: 30 tablet, Rfl: 11  •  chlorhexidine (PERIDEX) 0.12 % solution, RINSE WITH 15ML FOR 1 MINUTE AND SPIT OUT AS DIRECTED, Disp: , Rfl:   •  Cholecalciferol (Vitamin D) 50 MCG ( UT) capsule, , Disp: , Rfl:   •  doxycycline (VIBRAMYCIN) 100 MG capsule, Take 100 mg by mouth every night at bedtime., Disp: , Rfl:   •  fluticasone (FLONASE) 50 MCG/ACT nasal spray, 2 sprays by Each Nare route Daily., Disp: 16 g, Rfl: 11  •  hydrocortisone 2.5 % cream, APPLY A THIN LAYER TO THE AFFECTED AREA(S) ON THE FACE TOPICALLY  TWICE DAILY FOR 2 WEEKS AS NEEDED FOR FLARES, Disp: , Rfl: 3  •  lisinopril-hydrochlorothiazide (PRINZIDE,ZESTORETIC) 10-12.5 MG per tablet, Take 1 tablet by mouth Daily., Disp: 30 tablet, Rfl: 11  •  montelukast (Singulair) 10 MG tablet, Take 1 tablet by mouth Every Night., Disp: 30 tablet, Rfl: 11  •  nitroglycerin (NITROSTAT) 0.4 MG SL tablet, PLACE ONE TABLET UNDER TONGUE FOR CHEST PAIN, MAY REPEAT EVERY 5 MINUTES, AFTER 3 DOSES CALL 911, Disp: , Rfl:   •  rosuvastatin (CRESTOR) 40 MG tablet, Take 40 mg by mouth Daily., Disp: , Rfl:   •  sitaGLIPtin-metFORMIN (Janumet)  MG per tablet, Take 1 tablet by mouth 2 (Two) Times a Day With Meals., Disp: 60 tablet, Rfl: 5  •  TiZANidine (ZANAFLEX) 4 MG capsule, TAKE 1 CAPSULE BY MOUTH THREE TIMES A DAY AS NEEDED FOR MUSCLE SPASM, Disp: 90 capsule, Rfl: 0  •  traZODone (DESYREL) 50 MG tablet, Take 1 tablet by mouth every night at bedtime., Disp: 30 tablet, Rfl: 5  •  tretinoin (RETIN-A) 0.1 % cream, APPLY AT BEDTIME TO AFFECTED AREA ON FACE, Disp: , Rfl:     Allergies:   Allergies   Allergen Reactions   • Wellbutrin [Bupropion] Rash         Objective     Physical Exam:  Vital Signs:   Heart rate 80, respiration 16.   Physical Exam  Vitals and nursing note reviewed.   Constitutional:       General: She is not in acute distress.     Appearance: Normal appearance. She is well-developed. She is not ill-appearing, toxic-appearing or diaphoretic.   HENT:      Head: Normocephalic and atraumatic.   Cardiovascular:      Rate and Rhythm: Normal rate and regular rhythm.      Heart sounds: Normal heart sounds.   Pulmonary:      Effort: Pulmonary effort is normal. No respiratory distress.      Breath sounds: Normal breath sounds. No stridor. No wheezing.   Skin:     General: Skin is warm and dry.   Neurological:      General: No focal deficit present.      Mental Status: She is alert and oriented to person, place, and time.   Psychiatric:         Mood and Affect: Mood normal.          Behavior: Behavior normal. Behavior is cooperative.         Thought Content: Thought content normal.         Judgment: Judgment normal.         Assessment / Plan      Assessment/Plan:   Diagnoses and all orders for this visit:    1. Essential hypertension (Primary)  Assessment & Plan:  Hypertension is stable and controlled.  Continue current treatment regimen.  Dietary sodium restriction.  Weight loss.  Regular aerobic exercise.  Continue current medications.  Ambulatory blood pressure monitoring.  Blood pressure will be reassessed at the next regular appointment.    Orders:  -     Comprehensive Metabolic Panel  -     CBC Auto Differential  -     TSH  -     CBC & Differential    2. Type 2 diabetes mellitus without complication, without long-term current use of insulin (HCC)  Assessment & Plan:  Diabetes is improving with treatment.   Continue current treatment regimen.  Dietary recommendations for ADA diet.  Diabetes will be reassessed in 3 months.  Laboratory studies per orders. Further recommendation after laboratory evaluation.    Orders:  -     Hemoglobin A1c  -     sitaGLIPtin-metFORMIN (Janumet)  MG per tablet; Take 1 tablet by mouth 2 (Two) Times a Day With Meals.  Dispense: 60 tablet; Refill: 5    3. Insomnia, unspecified type  -     traZODone (DESYREL) 50 MG tablet; Take 1 tablet by mouth every night at bedtime.  Dispense: 30 tablet; Refill: 5    4. Medication refill  -     sitaGLIPtin-metFORMIN (Janumet)  MG per tablet; Take 1 tablet by mouth 2 (Two) Times a Day With Meals.  Dispense: 60 tablet; Refill: 5  -     traZODone (DESYREL) 50 MG tablet; Take 1 tablet by mouth every night at bedtime.  Dispense: 30 tablet; Refill: 5         Follow Up:   PRN and at next scheduled appointment(s) with PCP.    Discussed the nature of the medical condition(s) risks, complications, implications, management, safe and proper use of medications. Encouraged medication compliance, and keeping scheduled follow  up appointments with me and any other providers.      RTC if symptoms fail to improve, to ER if symptoms worsen.      JAGDEEP Keen  [facility] [place]

## 2021-11-25 NOTE — ASSESSMENT & PLAN NOTE
Hypertension is stable and controlled.  Continue current treatment regimen.  Dietary sodium restriction.  Weight loss.  Regular aerobic exercise.  Continue current medications.  Ambulatory blood pressure monitoring.  Blood pressure will be reassessed at the next regular appointment.

## 2021-11-25 NOTE — ASSESSMENT & PLAN NOTE
Diabetes is improving with treatment.   Continue current treatment regimen.  Dietary recommendations for ADA diet.  Diabetes will be reassessed in 3 months.  Laboratory studies per orders. Further recommendation after laboratory evaluation.

## 2021-12-09 ENCOUNTER — TELEPHONE (OUTPATIENT)
Dept: FAMILY MEDICINE CLINIC | Facility: CLINIC | Age: 51
End: 2021-12-09

## 2021-12-09 NOTE — TELEPHONE ENCOUNTER
Caller: KavonTeena    Relationship: Self    Best call back number: [phone]    What medication are you requesting: antibiotic    What are your current symptoms: sniffles, fluid in ears, congested    How long have you been experiencing symptoms: 5 days    Have you had these symptoms before:    [] Yes  [] No    Have you been treated for these symptoms before:   [] Yes  [] No    If a prescription is needed, what is your preferred pharmacy and phone number: [id number] - [place], [place] - [address] [place] [address] - [phone] [facility] [phone]      Additional notes:

## 2022-01-27 ENCOUNTER — TELEPHONE (OUTPATIENT)
Dept: FAMILY MEDICINE CLINIC | Facility: CLINIC | Age: 52
End: 2022-01-27

## 2022-01-27 NOTE — TELEPHONE ENCOUNTER
Caller: Teena Jacobson    Relationship: Self    Best call back number:[phone]    What is the best time to reach you: ANYTIME    Who are you requesting to speak with (clinical staff, provider,  specific staff member): CLINICAL STAFF    Do you know the name of the person who called:     What was the call regarding: PATIENT WAS PRESCRIBED   RANEXA BY HER CARDIOLOGIST FOR ANGINA.    SHE WAS TOLD BY THE PHARMACIST THIS COULD HAVE AN ADVERSE  INTERACTION WITH    traZODone (DESYREL) 50 MG tablet    Do you require a callback: YES

## 2022-01-28 NOTE — TELEPHONE ENCOUNTER
Hub staff attempted to follow warm transfer process and was unsuccessful     Caller: Teena Jacobson    Relationship to patient: Self    Best call back number: [phone]    Patient is needing: PATIENT CALLED TO FOLLOW UP REGARDING MEDICATION CONCERN.

## 2022-01-28 NOTE — TELEPHONE ENCOUNTER
Please let the patient know Rosalva is not working today.Typically people do not have issue with these medications together, but she can schedule an apt with Rosalva to further discuss if she is concerned and does not want to take trazodone.

## 2022-02-16 DIAGNOSIS — J30.9 ALLERGIC RHINITIS, UNSPECIFIED SEASONALITY, UNSPECIFIED TRIGGER: ICD-10-CM

## 2022-02-16 DIAGNOSIS — Z76.0 MEDICATION REFILL: ICD-10-CM

## 2022-02-16 RX ORDER — FLUTICASONE PROPIONATE 50 MCG
2 SPRAY, SUSPENSION (ML) NASAL DAILY
Qty: 16 G | Refills: 2 | Status: SHIPPED | OUTPATIENT
Start: 2022-02-16 | End: 2022-02-24 | Stop reason: SDUPTHER

## 2022-02-24 DIAGNOSIS — Z76.0 MEDICATION REFILL: ICD-10-CM

## 2022-02-24 DIAGNOSIS — J30.9 ALLERGIC RHINITIS, UNSPECIFIED SEASONALITY, UNSPECIFIED TRIGGER: ICD-10-CM

## 2022-02-24 RX ORDER — FLUTICASONE PROPIONATE 50 MCG
2 SPRAY, SUSPENSION (ML) NASAL DAILY
Qty: 16 G | Refills: 2 | Status: SHIPPED | OUTPATIENT
Start: 2022-02-24

## 2022-02-24 RX ORDER — FLUTICASONE PROPIONATE 50 MCG
2 SPRAY, SUSPENSION (ML) NASAL DAILY
Qty: 16 G | Refills: 2 | Status: SHIPPED | OUTPATIENT
Start: 2022-02-24 | End: 2022-02-24 | Stop reason: SDUPTHER

## 2022-02-24 NOTE — TELEPHONE ENCOUNTER
Last appointment: 11/23/21  Next appointment: N/A  Last Refill: 02/16/22  Quantity: 16 g  Refills: 2

## 2022-03-01 ENCOUNTER — TELEPHONE (OUTPATIENT)
Dept: FAMILY MEDICINE CLINIC | Facility: CLINIC | Age: 52
End: 2022-03-01

## 2022-03-01 NOTE — TELEPHONE ENCOUNTER
Caller: Teena Jacobson    Relationship: Self    Best call back number: [phone]    What is the best time to reach you: ANYTIME    Who are you requesting to speak with (clinical staff, provider,  specific staff member): CLINICAL STAFF OR PROVIDER     What was the call regarding: PATIENT STATES THAT SHE NEEDS A PRIOR AUTHORIZATION ON JANUMET AND IS REQUESTING SAMPLES. PLEASE ADVISE    Do you require a callback: YES

## 2022-03-03 ENCOUNTER — PRIOR AUTHORIZATION (OUTPATIENT)
Dept: FAMILY MEDICINE CLINIC | Facility: CLINIC | Age: 52
End: 2022-03-03

## 2022-03-03 NOTE — TELEPHONE ENCOUNTER
Approved today  PA Case: [id number], Status: Approved, Coverage Starts on: 3/3/2022 12:00:00 AM, Coverage Ends on: 3/3/2023 12:00:00 AM.        PA started for Sohailjo Jacobson Diaz: [id number] - PA Case ID: [id number] - Rx #: [id number]

## 2022-05-27 ENCOUNTER — OFFICE VISIT (OUTPATIENT)
Dept: FAMILY MEDICINE CLINIC | Facility: CLINIC | Age: 52
End: 2022-05-27

## 2022-05-27 VITALS
WEIGHT: 205 LBS | DIASTOLIC BLOOD PRESSURE: 72 MMHG | SYSTOLIC BLOOD PRESSURE: 114 MMHG | BODY MASS INDEX: 38.71 KG/M2 | TEMPERATURE: 98 F | RESPIRATION RATE: 20 BRPM | HEIGHT: 61 IN | HEART RATE: 68 BPM | OXYGEN SATURATION: 98 %

## 2022-05-27 DIAGNOSIS — Z91.09 ENVIRONMENTAL ALLERGIES: ICD-10-CM

## 2022-05-27 DIAGNOSIS — E11.9 TYPE 2 DIABETES MELLITUS WITHOUT COMPLICATION, WITHOUT LONG-TERM CURRENT USE OF INSULIN: ICD-10-CM

## 2022-05-27 DIAGNOSIS — F51.04 PSYCHOPHYSIOLOGICAL INSOMNIA: ICD-10-CM

## 2022-05-27 DIAGNOSIS — Z23 NEED FOR HEPATITIS B VACCINATION: ICD-10-CM

## 2022-05-27 DIAGNOSIS — I10 ESSENTIAL HYPERTENSION: Chronic | ICD-10-CM

## 2022-05-27 DIAGNOSIS — Z00.00 ANNUAL PHYSICAL EXAM: Primary | ICD-10-CM

## 2022-05-27 PROCEDURE — 99396 PREV VISIT EST AGE 40-64: CPT | Performed by: NURSE PRACTITIONER

## 2022-05-27 PROCEDURE — 90746 HEPB VACCINE 3 DOSE ADULT IM: CPT | Performed by: NURSE PRACTITIONER

## 2022-05-27 PROCEDURE — 90471 IMMUNIZATION ADMIN: CPT | Performed by: NURSE PRACTITIONER

## 2022-05-27 RX ORDER — LEVOCETIRIZINE DIHYDROCHLORIDE 5 MG/1
5 TABLET, FILM COATED ORAL EVERY EVENING
Qty: 30 TABLET | Refills: 2 | Status: SHIPPED | OUTPATIENT
Start: 2022-05-27 | End: 2022-09-19

## 2022-05-27 RX ORDER — MIRTAZAPINE 7.5 MG/1
7.5 TABLET, FILM COATED ORAL NIGHTLY
Qty: 30 TABLET | Refills: 0 | Status: SHIPPED | OUTPATIENT
Start: 2022-05-27 | End: 2022-07-08

## 2022-05-27 RX ORDER — DOXYCYCLINE HYCLATE 20 MG
TABLET ORAL
COMMUNITY
Start: 2022-03-01

## 2022-05-27 RX ORDER — RANOLAZINE 500 MG/1
500 TABLET, EXTENDED RELEASE ORAL 2 TIMES DAILY
COMMUNITY
Start: 2022-03-23

## 2022-05-27 RX ORDER — RANOLAZINE 500 MG/1
TABLET, EXTENDED RELEASE ORAL EVERY 12 HOURS SCHEDULED
COMMUNITY
Start: 2022-01-27 | End: 2022-05-27 | Stop reason: SDUPTHER

## 2022-05-27 NOTE — PROGRESS NOTES
Follow Up Office Note     Patient Name: Teena Jacobson  : 1970   MRN: [id number]     Chief Complaint:    Chief Complaint   Patient presents with   • Annual Exam   • Insomnia       History of Present Illness: Teena Jacobson is a 52 y.o. female who presents today for annual physical exam. Patient has chronic HTN and DM2 which have both been stable and controlled on current medications. She states that she feels well overall, but is still having difficulty sleeping. She states that she had to stop taking trazodone due to the fact that she was recently started on Ranexa by her cardiologist and it interacts with this medication. Patient states that she is also having trouble sleeping due to loss of father in February of this year to Covid-19. Her  passed away 1 1/2 years ago.      Subjective      Review of Systems:   Review of Systems   Constitutional: Negative.    HENT: Negative.    Eyes: Negative.    Respiratory: Negative.    Cardiovascular: Negative.    Gastrointestinal: Negative.    Genitourinary: Negative.    Musculoskeletal: Negative.    Skin: Negative.    Allergic/Immunologic: Positive for environmental allergies.   Neurological: Negative.    Psychiatric/Behavioral: Positive for sleep disturbance. Negative for dysphoric mood and suicidal ideas. The patient is nervous/anxious.         Past Medical History:   Past Medical History:   Diagnosis Date   • Anxiety    • Cancer (HCC)     breast, chemo, radiation done   • Depression    • Diabetes mellitus (HCC)    • Fibroid    • H/O mastectomy    • Hypertension          Medications:     Current Outpatient Medications:   •  ACCU-CHEK FASTCLIX LANCETS misc, USE TO TEST BLOOD GLUCOSE THREE TIMES DAILY, Disp: , Rfl: 11  •  carvedilol (COREG) 12.5 MG tablet, Take 12.5 mg by mouth 2 (Two) Times a Day., Disp: , Rfl:   •  chlorhexidine (PERIDEX) 0.12 % solution, RINSE WITH 15ML FOR 1 MINUTE AND SPIT OUT AS DIRECTED, Disp: , Rfl:   •   Cholecalciferol (Vitamin D) 50 MCG (2000 UT) capsule, , Disp: , Rfl:   •  doxycycline (PERIOSTAT) 20 MG tablet, doxycycline hyclate 20 mg tablet  TAKE 2 TABLETS PO QD, Disp: , Rfl:   •  fluticasone (FLONASE) 50 MCG/ACT nasal spray, 2 sprays by Each Nare route Daily., Disp: 16 g, Rfl: 2  •  hydrocortisone 2.5 % cream, APPLY A THIN LAYER TO THE AFFECTED AREA(S) ON THE FACE TOPICALLY TWICE DAILY FOR 2 WEEKS AS NEEDED FOR FLARES, Disp: , Rfl: 3  •  lisinopril-hydrochlorothiazide (PRINZIDE,ZESTORETIC) 10-12.5 MG per tablet, Take 1 tablet by mouth Daily., Disp: 30 tablet, Rfl: 11  •  montelukast (Singulair) 10 MG tablet, Take 1 tablet by mouth Every Night., Disp: 30 tablet, Rfl: 11  •  nitroglycerin (NITROSTAT) 0.4 MG SL tablet, PLACE ONE TABLET UNDER TONGUE FOR CHEST PAIN, MAY REPEAT EVERY 5 MINUTES, AFTER 3 DOSES CALL 911, Disp: , Rfl:   •  ranolazine (RANEXA) 500 MG 12 hr tablet, Take 500 mg by mouth 2 (Two) Times a Day., Disp: , Rfl:   •  rosuvastatin (CRESTOR) 40 MG tablet, Take 40 mg by mouth Daily., Disp: , Rfl:   •  sitaGLIPtin-metFORMIN (Janumet)  MG per tablet, Take 1 tablet by mouth 2 (Two) Times a Day With Meals., Disp: 60 tablet, Rfl: 5  •  TiZANidine (ZANAFLEX) 4 MG capsule, TAKE 1 CAPSULE BY MOUTH THREE TIMES A DAY AS NEEDED FOR MUSCLE SPASM, Disp: 90 capsule, Rfl: 0  •  tretinoin (RETIN-A) 0.1 % cream, APPLY AT BEDTIME TO AFFECTED AREA ON FACE, Disp: , Rfl:   •  levocetirizine (XYZAL) 5 MG tablet, Take 1 tablet by mouth Every Evening., Disp: 30 tablet, Rfl: 2  •  mirtazapine (REMERON) 7.5 MG tablet, Take 1 tablet by mouth Every Night., Disp: 30 tablet, Rfl: 0    Allergies:   Allergies   Allergen Reactions   • Wellbutrin [Bupropion] Rash       PHQ-2/PHQ-9 Depression Screening 5/27/2022   Retired PHQ-9 Total Score -   Retired Total Score -   Little Interest or Pleasure in Doing Things 0-->not at all   Feeling Down, Depressed or Hopeless 0-->not at all   PHQ-9: Brief Depression Severity Measure Score 0  "  Little interest or pleasure in doing things -   Feeling down, depressed, or hopeless -   Trouble falling or staying asleep, or sleeping too much -   Feeling tired or having little energy -   Poor appetite or overeating -   Feeling bad about yourself - or that you are a failure or have let yourself or your family down -   Trouble concentrating on things, such as reading the newspaper or watching television -   Moving or speaking so slowly that other people could have noticed. Or the opposite - being so fidgety or restless that you have been moving around a lot more than usual -   Thoughts that you would be better off dead, or of hurting yourself in some way -   How difficult have these problems made it for you to do your work, take care of things at home, or get along with other people? -       Objective     Physical Exam:  Vital Signs:   Vitals:    05/27/22 1408   BP: 114/72   Pulse: 68   Resp: 20   Temp: 98 °F (36.7 °C)   SpO2: 98%   Weight: 93 kg (205 lb)   Height: 154.9 cm (61\")   PainSc: 0-No pain     Body mass index is 38.73 kg/m².     Physical Exam  Vitals and nursing note reviewed. Exam conducted with a chaperone present.   Constitutional:       General: She is not in acute distress.     Appearance: Normal appearance. She is well-developed and well-groomed. She is not ill-appearing, toxic-appearing or diaphoretic.   HENT:      Head: Normocephalic and atraumatic.      Right Ear: Tympanic membrane, ear canal and external ear normal.      Left Ear: Tympanic membrane, ear canal and external ear normal.      Nose: Nose normal.      Mouth/Throat:      Lips: Pink.      Mouth: Mucous membranes are moist.      Pharynx: Oropharynx is clear. Uvula midline. No posterior oropharyngeal erythema.   Neck:      Thyroid: No thyroid mass, thyromegaly or thyroid tenderness.   Cardiovascular:      Rate and Rhythm: Normal rate and regular rhythm.      Pulses:           Dorsalis pedis pulses are 2+ on the right side and 2+ on the " left side.        Posterior tibial pulses are 1+ on the right side and 1+ on the left side.      Heart sounds: Normal heart sounds, S1 normal and S2 normal. No murmur heard.  Pulmonary:      Effort: Pulmonary effort is normal. No respiratory distress.      Breath sounds: Normal breath sounds.   Abdominal:      General: Bowel sounds are normal. There is no distension.      Palpations: Abdomen is soft.      Tenderness: There is no abdominal tenderness.   Musculoskeletal:         General: Normal range of motion.      Cervical back: Normal range of motion and neck supple.      Right lower leg: No edema.      Left lower leg: No edema.      Right foot: Normal range of motion.      Left foot: Normal range of motion.   Feet:      Right foot:      Protective Sensation: 9 sites tested. 9 sites sensed.      Skin integrity: Skin integrity normal.      Toenail Condition: Right toenails are normal.      Left foot:      Protective Sensation: 9 sites tested. 9 sites sensed.      Skin integrity: Skin integrity normal.      Toenail Condition: Left toenails are normal.      Comments: Diabetic foot exam performed with monofilament testing with normal findings.  Lymphadenopathy:      Cervical: No cervical adenopathy.   Skin:     General: Skin is warm and dry.      Capillary Refill: Capillary refill takes less than 2 seconds.      Findings: No rash.   Neurological:      General: No focal deficit present.      Mental Status: She is alert and oriented to person, place, and time.   Psychiatric:         Attention and Perception: Attention and perception normal.         Mood and Affect: Mood and affect normal.         Speech: Speech normal.         Behavior: Behavior normal. Behavior is cooperative.         Thought Content: Thought content normal.         Cognition and Memory: Cognition and memory normal.         Judgment: Judgment normal.         Assessment / Plan      Assessment/Plan:   Diagnoses and all orders for this visit:    1. Annual  physical exam (Primary)  -     Comprehensive Metabolic Panel; Future  -     TSH; Future  -     CBC Auto Differential; Future  -     Lipid Panel; Future  -     Urinalysis With Culture If Indicated - Urine, Clean Catch; Future    2. Type 2 diabetes mellitus without complication, without long-term current use of insulin (HCC)  -     Hemoglobin A1c; Future  -     MicroAlbumin, Urine, Random - Urine, Clean Catch; Future  -     Hepatitis B Vaccine Adult IM    3. Essential hypertension  -     Comprehensive Metabolic Panel; Future  -     TSH; Future  -     CBC Auto Differential; Future  -     Lipid Panel; Future    4. Environmental allergies  -     levocetirizine (XYZAL) 5 MG tablet; Take 1 tablet by mouth Every Evening.  Dispense: 30 tablet; Refill: 2    5. Psychophysiological insomnia  -     mirtazapine (REMERON) 7.5 MG tablet; Take 1 tablet by mouth Every Night.  Dispense: 30 tablet; Refill: 0    6. Need for hepatitis B vaccination  -     Hepatitis B Vaccine Adult IM      Follow Up:   PRN and at next scheduled appointment(s) with PCP.    Discussed the nature of the medical condition(s) risks, complications, implications, management, safe and proper use of medications. Encouraged medication compliance, and keeping scheduled follow up appointments with me and any other providers.      The patient is here for a health maintenance visit.  Currently, the patient consumes a mostly healthy diet and has an adequate exercise regimen. Screening lab work is ordered.  Immunizations are declined today and vaccine education is provided.  Advice and education is given regarding nutrition, aerobic exercise, routine dental evaluations, routine eye exams, reproductive health, cardiovascular risk reduction, sunscreen use, self skin examination (annual dermatology evaluations) and seat belt use (general overall safety). Further recommendations after lab evaluation.  Annual wellness evaluations recommended.  Recommend annual diabetic eye  exam.    JAGDEEP Keen  [facility] [place]

## 2022-06-10 ENCOUNTER — LAB (OUTPATIENT)
Dept: LAB | Facility: HOSPITAL | Age: 52
End: 2022-06-10

## 2022-06-10 DIAGNOSIS — Z00.00 ANNUAL PHYSICAL EXAM: ICD-10-CM

## 2022-06-10 DIAGNOSIS — E11.9 TYPE 2 DIABETES MELLITUS WITHOUT COMPLICATION, WITHOUT LONG-TERM CURRENT USE OF INSULIN: ICD-10-CM

## 2022-06-10 DIAGNOSIS — I10 ESSENTIAL HYPERTENSION: Chronic | ICD-10-CM

## 2022-06-10 LAB
ALBUMIN SERPL-MCNC: 4.4 G/DL (ref 3.5–5.2)
ALBUMIN UR-MCNC: 1.2 MG/DL
ALBUMIN/GLOB SERPL: 1.7 G/DL
ALP SERPL-CCNC: 71 U/L (ref 39–117)
ALT SERPL W P-5'-P-CCNC: 19 U/L (ref 1–33)
ANION GAP SERPL CALCULATED.3IONS-SCNC: 9 MMOL/L (ref 5–15)
AST SERPL-CCNC: 21 U/L (ref 1–32)
BACTERIA UR QL AUTO: ABNORMAL /HPF
BASOPHILS # BLD AUTO: 0.04 10*3/MM3 (ref 0–0.2)
BASOPHILS NFR BLD AUTO: 0.6 % (ref 0–1.5)
BILIRUB SERPL-MCNC: 0.3 MG/DL (ref 0–1.2)
BILIRUB UR QL STRIP: NEGATIVE
BUN SERPL-MCNC: 11 MG/DL (ref 6–20)
BUN/CREAT SERPL: 12.6 (ref 7–25)
CALCIUM SPEC-SCNC: 9.3 MG/DL (ref 8.6–10.5)
CHLORIDE SERPL-SCNC: 106 MMOL/L (ref 98–107)
CHOLEST SERPL-MCNC: 85 MG/DL (ref 0–200)
CLARITY UR: ABNORMAL
CO2 SERPL-SCNC: 23 MMOL/L (ref 22–29)
COLOR UR: YELLOW
CREAT SERPL-MCNC: 0.87 MG/DL (ref 0.57–1)
DEPRECATED RDW RBC AUTO: 41.5 FL (ref 37–54)
EGFRCR SERPLBLD CKD-EPI 2021: 79.8 ML/MIN/1.73
EOSINOPHIL # BLD AUTO: 0.11 10*3/MM3 (ref 0–0.4)
EOSINOPHIL NFR BLD AUTO: 1.8 % (ref 0.3–6.2)
ERYTHROCYTE [DISTWIDTH] IN BLOOD BY AUTOMATED COUNT: 13.5 % (ref 12.3–15.4)
GLOBULIN UR ELPH-MCNC: 2.6 GM/DL
GLUCOSE SERPL-MCNC: 120 MG/DL (ref 65–99)
GLUCOSE UR STRIP-MCNC: NEGATIVE MG/DL
HBA1C MFR BLD: 6.5 % (ref 4.8–5.6)
HCT VFR BLD AUTO: 36.6 % (ref 34–46.6)
HDLC SERPL-MCNC: 52 MG/DL (ref 40–60)
HGB BLD-MCNC: 11.9 G/DL (ref 12–15.9)
HGB UR QL STRIP.AUTO: NEGATIVE
HYALINE CASTS UR QL AUTO: ABNORMAL /LPF
IMM GRANULOCYTES # BLD AUTO: 0.02 10*3/MM3 (ref 0–0.05)
IMM GRANULOCYTES NFR BLD AUTO: 0.3 % (ref 0–0.5)
KETONES UR QL STRIP: ABNORMAL
LDLC SERPL CALC-MCNC: 20 MG/DL (ref 0–100)
LDLC/HDLC SERPL: 0.44 {RATIO}
LEUKOCYTE ESTERASE UR QL STRIP.AUTO: ABNORMAL
LYMPHOCYTES # BLD AUTO: 2.29 10*3/MM3 (ref 0.7–3.1)
LYMPHOCYTES NFR BLD AUTO: 36.8 % (ref 19.6–45.3)
MCH RBC QN AUTO: 27.7 PG (ref 26.6–33)
MCHC RBC AUTO-ENTMCNC: 32.5 G/DL (ref 31.5–35.7)
MCV RBC AUTO: 85.3 FL (ref 79–97)
MONOCYTES # BLD AUTO: 0.49 10*3/MM3 (ref 0.1–0.9)
MONOCYTES NFR BLD AUTO: 7.9 % (ref 5–12)
NEUTROPHILS NFR BLD AUTO: 3.28 10*3/MM3 (ref 1.7–7)
NEUTROPHILS NFR BLD AUTO: 52.6 % (ref 42.7–76)
NITRITE UR QL STRIP: NEGATIVE
NRBC BLD AUTO-RTO: 0 /100 WBC (ref 0–0.2)
PH UR STRIP.AUTO: 5.5 [PH] (ref 5–8)
PLATELET # BLD AUTO: 254 10*3/MM3 (ref 140–450)
PMV BLD AUTO: 10.8 FL (ref 6–12)
POTASSIUM SERPL-SCNC: 4.3 MMOL/L (ref 3.5–5.2)
PROT SERPL-MCNC: 7 G/DL (ref 6–8.5)
PROT UR QL STRIP: ABNORMAL
RBC # BLD AUTO: 4.29 10*6/MM3 (ref 3.77–5.28)
RBC # UR STRIP: ABNORMAL /HPF
REF LAB TEST METHOD: ABNORMAL
SODIUM SERPL-SCNC: 138 MMOL/L (ref 136–145)
SP GR UR STRIP: >=1.03 (ref 1–1.03)
SQUAMOUS #/AREA URNS HPF: ABNORMAL /HPF
TRIGL SERPL-MCNC: 50 MG/DL (ref 0–150)
TSH SERPL DL<=0.05 MIU/L-ACNC: 2.06 UIU/ML (ref 0.27–4.2)
UROBILINOGEN UR QL STRIP: ABNORMAL
VLDLC SERPL-MCNC: 13 MG/DL (ref 5–40)
WBC # UR STRIP: ABNORMAL /HPF
WBC NRBC COR # BLD: 6.23 10*3/MM3 (ref 3.4–10.8)

## 2022-06-10 PROCEDURE — 87086 URINE CULTURE/COLONY COUNT: CPT

## 2022-06-10 PROCEDURE — 80061 LIPID PANEL: CPT

## 2022-06-10 PROCEDURE — 83036 HEMOGLOBIN GLYCOSYLATED A1C: CPT

## 2022-06-10 PROCEDURE — 82043 UR ALBUMIN QUANTITATIVE: CPT

## 2022-06-10 PROCEDURE — 80050 GENERAL HEALTH PANEL: CPT

## 2022-06-10 PROCEDURE — 81001 URINALYSIS AUTO W/SCOPE: CPT

## 2022-06-12 LAB — BACTERIA SPEC AEROBE CULT: ABNORMAL

## 2022-06-15 DIAGNOSIS — Z76.0 MEDICATION REFILL: ICD-10-CM

## 2022-06-15 DIAGNOSIS — J30.9 ALLERGIC RHINITIS, UNSPECIFIED SEASONALITY, UNSPECIFIED TRIGGER: ICD-10-CM

## 2022-06-15 DIAGNOSIS — I10 ESSENTIAL HYPERTENSION: ICD-10-CM

## 2022-06-15 RX ORDER — LISINOPRIL AND HYDROCHLOROTHIAZIDE 12.5; 1 MG/1; MG/1
TABLET ORAL
Qty: 30 TABLET | Refills: 2 | Status: SHIPPED | OUTPATIENT
Start: 2022-06-15 | End: 2023-03-27

## 2022-06-15 RX ORDER — MONTELUKAST SODIUM 10 MG/1
TABLET ORAL
Qty: 30 TABLET | Refills: 2 | Status: SHIPPED | OUTPATIENT
Start: 2022-06-15 | End: 2022-09-29

## 2022-07-08 DIAGNOSIS — Z76.0 MEDICATION REFILL: ICD-10-CM

## 2022-07-08 DIAGNOSIS — E11.9 TYPE 2 DIABETES MELLITUS WITHOUT COMPLICATION, WITHOUT LONG-TERM CURRENT USE OF INSULIN: ICD-10-CM

## 2022-07-08 DIAGNOSIS — F51.04 PSYCHOPHYSIOLOGICAL INSOMNIA: ICD-10-CM

## 2022-07-08 RX ORDER — MIRTAZAPINE 7.5 MG/1
TABLET, FILM COATED ORAL
Qty: 30 TABLET | Refills: 0 | Status: SHIPPED | OUTPATIENT
Start: 2022-07-08 | End: 2022-09-19

## 2022-07-08 RX ORDER — SITAGLIPTIN AND METFORMIN HYDROCHLORIDE 500; 50 MG/1; MG/1
TABLET, FILM COATED ORAL
Qty: 60 TABLET | Refills: 0 | Status: SHIPPED | OUTPATIENT
Start: 2022-07-08 | End: 2022-08-09

## 2022-08-08 DIAGNOSIS — E11.9 TYPE 2 DIABETES MELLITUS WITHOUT COMPLICATION, WITHOUT LONG-TERM CURRENT USE OF INSULIN: ICD-10-CM

## 2022-08-08 DIAGNOSIS — Z76.0 MEDICATION REFILL: ICD-10-CM

## 2022-08-09 RX ORDER — SITAGLIPTIN AND METFORMIN HYDROCHLORIDE 500; 50 MG/1; MG/1
TABLET, FILM COATED ORAL
Qty: 60 TABLET | Refills: 0 | Status: SHIPPED | OUTPATIENT
Start: 2022-08-09 | End: 2022-12-20

## 2022-09-18 DIAGNOSIS — F51.04 PSYCHOPHYSIOLOGICAL INSOMNIA: ICD-10-CM

## 2022-09-19 DIAGNOSIS — Z91.09 ENVIRONMENTAL ALLERGIES: ICD-10-CM

## 2022-09-19 RX ORDER — LEVOCETIRIZINE DIHYDROCHLORIDE 5 MG/1
TABLET, FILM COATED ORAL
Qty: 30 TABLET | Refills: 0 | Status: SHIPPED | OUTPATIENT
Start: 2022-09-19 | End: 2022-10-24

## 2022-09-19 RX ORDER — MIRTAZAPINE 7.5 MG/1
TABLET, FILM COATED ORAL
Qty: 30 TABLET | Refills: 0 | Status: SHIPPED | OUTPATIENT
Start: 2022-09-19 | End: 2022-11-28

## 2022-09-29 DIAGNOSIS — Z76.0 MEDICATION REFILL: ICD-10-CM

## 2022-09-29 DIAGNOSIS — J30.9 ALLERGIC RHINITIS, UNSPECIFIED SEASONALITY, UNSPECIFIED TRIGGER: ICD-10-CM

## 2022-09-29 RX ORDER — MONTELUKAST SODIUM 10 MG/1
TABLET ORAL
Qty: 30 TABLET | Refills: 0 | Status: SHIPPED | OUTPATIENT
Start: 2022-09-29 | End: 2022-10-31

## 2022-10-23 DIAGNOSIS — Z91.09 ENVIRONMENTAL ALLERGIES: ICD-10-CM

## 2022-10-24 RX ORDER — LEVOCETIRIZINE DIHYDROCHLORIDE 5 MG/1
TABLET, FILM COATED ORAL
Qty: 30 TABLET | Refills: 0 | Status: SHIPPED | OUTPATIENT
Start: 2022-10-24 | End: 2022-10-31 | Stop reason: SDUPTHER

## 2022-10-31 DIAGNOSIS — J30.9 ALLERGIC RHINITIS, UNSPECIFIED SEASONALITY, UNSPECIFIED TRIGGER: ICD-10-CM

## 2022-10-31 DIAGNOSIS — Z76.0 MEDICATION REFILL: ICD-10-CM

## 2022-10-31 DIAGNOSIS — Z91.09 ENVIRONMENTAL ALLERGIES: ICD-10-CM

## 2022-10-31 RX ORDER — MONTELUKAST SODIUM 10 MG/1
TABLET ORAL
Qty: 30 TABLET | Refills: 0 | Status: SHIPPED | OUTPATIENT
Start: 2022-10-31 | End: 2022-10-31 | Stop reason: SDUPTHER

## 2022-10-31 RX ORDER — MONTELUKAST SODIUM 10 MG/1
10 TABLET ORAL
Qty: 90 TABLET | Refills: 0 | Status: SHIPPED | OUTPATIENT
Start: 2022-10-31 | End: 2022-11-01 | Stop reason: SDUPTHER

## 2022-10-31 RX ORDER — LEVOCETIRIZINE DIHYDROCHLORIDE 5 MG/1
5 TABLET, FILM COATED ORAL EVERY EVENING
Qty: 90 TABLET | Refills: 0 | Status: SHIPPED | OUTPATIENT
Start: 2022-10-31 | End: 2022-11-01 | Stop reason: SDUPTHER

## 2022-11-01 ENCOUNTER — TELEPHONE (OUTPATIENT)
Dept: FAMILY MEDICINE CLINIC | Facility: CLINIC | Age: 52
End: 2022-11-01

## 2022-11-01 DIAGNOSIS — Z76.0 MEDICATION REFILL: ICD-10-CM

## 2022-11-01 DIAGNOSIS — Z91.09 ENVIRONMENTAL ALLERGIES: ICD-10-CM

## 2022-11-01 DIAGNOSIS — J30.9 ALLERGIC RHINITIS, UNSPECIFIED SEASONALITY, UNSPECIFIED TRIGGER: ICD-10-CM

## 2022-11-01 RX ORDER — LEVOCETIRIZINE DIHYDROCHLORIDE 5 MG/1
5 TABLET, FILM COATED ORAL EVERY EVENING
Qty: 90 TABLET | Refills: 0 | Status: SHIPPED | OUTPATIENT
Start: 2022-11-01 | End: 2022-12-20 | Stop reason: SDUPTHER

## 2022-11-01 RX ORDER — MONTELUKAST SODIUM 10 MG/1
10 TABLET ORAL
Qty: 90 TABLET | Refills: 0 | Status: SHIPPED | OUTPATIENT
Start: 2022-11-01

## 2022-11-01 NOTE — TELEPHONE ENCOUNTER
Pt stated that pharmacy stated the meds that were sent to them yesterday were for 30 days, but it shows 90 days on our end

## 2022-11-26 DIAGNOSIS — F51.04 PSYCHOPHYSIOLOGICAL INSOMNIA: ICD-10-CM

## 2022-11-28 RX ORDER — MIRTAZAPINE 7.5 MG/1
TABLET, FILM COATED ORAL
Qty: 30 TABLET | Refills: 0 | Status: SHIPPED | OUTPATIENT
Start: 2022-11-28 | End: 2022-12-20 | Stop reason: SDUPTHER

## 2022-12-20 ENCOUNTER — OFFICE VISIT (OUTPATIENT)
Dept: OBSTETRICS AND GYNECOLOGY | Facility: CLINIC | Age: 52
End: 2022-12-20

## 2022-12-20 VITALS — SYSTOLIC BLOOD PRESSURE: 90 MMHG | WEIGHT: 206.4 LBS | BODY MASS INDEX: 39 KG/M2 | DIASTOLIC BLOOD PRESSURE: 60 MMHG

## 2022-12-20 DIAGNOSIS — Z85.3 HISTORY OF BREAST CANCER IN FEMALE: ICD-10-CM

## 2022-12-20 DIAGNOSIS — Z80.3 FAMILY HISTORY OF BREAST CANCER: ICD-10-CM

## 2022-12-20 DIAGNOSIS — Z98.890 HISTORY OF ENDOMETRIAL ABLATION: ICD-10-CM

## 2022-12-20 DIAGNOSIS — Z01.419 WELL WOMAN EXAM WITH ROUTINE GYNECOLOGICAL EXAM: Primary | ICD-10-CM

## 2022-12-20 DIAGNOSIS — N39.3 SUI (STRESS URINARY INCONTINENCE, FEMALE): ICD-10-CM

## 2022-12-20 PROCEDURE — 87086 URINE CULTURE/COLONY COUNT: CPT | Performed by: OBSTETRICS & GYNECOLOGY

## 2022-12-20 PROCEDURE — 81001 URINALYSIS AUTO W/SCOPE: CPT | Performed by: OBSTETRICS & GYNECOLOGY

## 2022-12-20 PROCEDURE — 99396 PREV VISIT EST AGE 40-64: CPT | Performed by: OBSTETRICS & GYNECOLOGY

## 2022-12-20 RX ORDER — PEN NEEDLE, DIABETIC 32GX 5/32"
NEEDLE, DISPOSABLE MISCELLANEOUS
COMMUNITY
Start: 2022-09-14

## 2022-12-20 RX ORDER — SEMAGLUTIDE 1.34 MG/ML
INJECTION, SOLUTION SUBCUTANEOUS
COMMUNITY
Start: 2022-12-14

## 2022-12-20 RX ORDER — DOXYCYCLINE 40 MG/1
CAPSULE ORAL EVERY 12 HOURS SCHEDULED
COMMUNITY

## 2022-12-20 RX ORDER — TRETINOIN 1 MG/G
CREAM TOPICAL
COMMUNITY
Start: 2009-12-13 | End: 2022-12-20 | Stop reason: SDUPTHER

## 2022-12-20 RX ORDER — DAPAGLIFLOZIN 10 MG/1
1 TABLET, FILM COATED ORAL EVERY MORNING
COMMUNITY
Start: 2022-09-13

## 2022-12-20 RX ORDER — MIRTAZAPINE 7.5 MG/1
TABLET, FILM COATED ORAL
COMMUNITY
End: 2023-01-17

## 2022-12-20 RX ORDER — LEVOCETIRIZINE DIHYDROCHLORIDE 5 MG/1
TABLET, FILM COATED ORAL
COMMUNITY
End: 2023-03-02

## 2022-12-20 RX ORDER — DIPHENOXYLATE HYDROCHLORIDE AND ATROPINE SULFATE 2.5; .025 MG/1; MG/1
TABLET ORAL
COMMUNITY

## 2022-12-20 NOTE — PROGRESS NOTES
Subjective   Chief Complaint   Patient presents with   • Annual Exam     No c/c     Teena Gracia Jacobson is a 52 y.o. year old  menopausal female presenting to be seen for her annual exam.  This past year she has not been on hormone replacement therapy.  There has not been vaginal bleeding in the last 12 months.  Menopausal symptoms are not present.    SEXUAL Hx:  She is not currently sexually active.  In the past year there she has not been sexually active.    Condoms are not needed because she is not sexually active.  She would not like to be screened for STD's at today's exam.  [place] is painful: n/a  HEALTH Hx:  She exercises regularly: yes. 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  She wears her seat belt: yes.  She has concerns about domestic violence: no.  OTHER THINGS SHE WANTS TO DISCUSS TODAY:  Nothing else    The following portions of the patient's history were reviewed and updated as appropriate:problem list, current medications, allergies, past family history, past medical history, past social history and past surgical history.    Social History    Tobacco Use      Smoking status: Never      Smokeless tobacco: Never      Review of Systems  Constitutional POS: nothing reported    NEG: anorexia or night sweats   Genitourinary POS: spontaneous urinary incontinence     NEG: dysuria or hematuria      Gastointestinal POS: nothing reported    NEG: bloating, change in bowel habits, melena or reflux symptoms   Integument POS: nothing reported    NEG: moles that are changing in size, shape, color or rashes   Breast POS: nothing reported    NEG: persistent breast lump, skin dimpling or nipple discharge        Objective   BP 90/60 (BP Location: Right arm, Patient Position: Sitting, Cuff Size: Large Adult)   Wt 93.6 kg (206 lb 6.4 oz)   BMI 39.00 kg/m²     General:  well developed; well nourished  no acute distress   Skin:  No suspicious lesions seen   Thyroid: normal to inspection and palpation    Breasts:  Examined in supine position  Symmetric without masses or skin dimpling  Nipples normal without inversion, lesions or discharge  There are no palpable axillary nodes  Mastectomy site well healed without palpable abnormalities   Abdomen: soft, non-tender; no masses  no umbilical or inguinal hernias are present  no hepato-splenomegaly   Pelvis: Clinical staff was present for exam  External genitalia:  normal appearance of the external genitalia including Bartholin's and Weir's glands.  :  urethral meatus normal;  Vaginal:  atrophic mucosal changes are present;  Cervix:  normal appearance.  Uterus:  normal size, shape and consistency.  Adnexa:  normal bimanual exam of the adnexa.  Rectal:  digital rectal exam not performed; anus visually normal appearing.        Assessment   1. Normal GYN exam in menopause  2. She is up to date on all relevant gynecologic and colorectal screenings   3. JEWELS  4. History of breast cancer s/p left lumpectomy in ~ 2009  5. History of endometrial ablation      Plan   Pap was not done today.  I explained to Teena that the recommendations for Pap smear interval in a low risk patient has lengthened to 3 years time.  I told Teena she still needs to be seen in our office yearly for a full physical including breast and pelvic exam.  She was encouraged to get yearly mammograms.  She should report any palpable breast lump(s) or skin changes regardless of mammographic findings.  I explained to Teena that notification regarding her mammogram results will come from the center performing the study.  Our office will not be routinely calling with mammogram results.  It is her responsibility to make sure that the results from the mammogram are communicated to her by the breast center.  If she has any questions about the results, she is welcome to call our office anytime.  Colonoscopy UTD. Start DEXA ~ 64 years of age.  Reviewed the importance of exercise 2-3 times per week with at least  20-30 minutes of cardio  The following tests were ordered today: UA with culture if indicated.  It was explained to Teena that all lab test should be back within the one week after they are performed. She will be notified about the results, regardless of the findings. If she has not been contacted by the office within 2 weeks after the test has been performed, it is her responsibility to contact us to learn about her results.  Reviewed timed voids and kegel exercises in regards to JEWELS and to call the office if these symptoms become more bothersome.  Request last mammogram. Request genetic screening from last gynecologist  The importance of keeping all planned follow-up and taking all medications as prescribed was emphasized.  Follow up for annual exam in ~ one year    No orders of the defined types were placed in this encounter.         This note was electronically signed.    Elke Epps MD  December 20, 2022

## 2022-12-21 LAB
BACTERIA UR QL AUTO: ABNORMAL /HPF
BILIRUB UR QL STRIP: NEGATIVE
CLARITY UR: ABNORMAL
COLOR UR: YELLOW
GLUCOSE UR STRIP-MCNC: ABNORMAL MG/DL
HGB UR QL STRIP.AUTO: NEGATIVE
HYALINE CASTS UR QL AUTO: ABNORMAL /LPF
KETONES UR QL STRIP: NEGATIVE
LEUKOCYTE ESTERASE UR QL STRIP.AUTO: ABNORMAL
NITRITE UR QL STRIP: NEGATIVE
PH UR STRIP.AUTO: 5.5 [PH] (ref 5–8)
PROT UR QL STRIP: NEGATIVE
RBC # UR STRIP: ABNORMAL /HPF
REF LAB TEST METHOD: ABNORMAL
SP GR UR STRIP: >=1.03 (ref 1–1.03)
SQUAMOUS #/AREA URNS HPF: ABNORMAL /HPF
UROBILINOGEN UR QL STRIP: ABNORMAL
WBC # UR STRIP: ABNORMAL /HPF

## 2022-12-22 LAB — BACTERIA SPEC AEROBE CULT: NORMAL

## 2023-01-17 RX ORDER — MIRTAZAPINE 7.5 MG/1
TABLET, FILM COATED ORAL
Qty: 30 TABLET | Refills: 0 | Status: SHIPPED | OUTPATIENT
Start: 2023-01-17 | End: 2023-04-04

## 2023-03-02 RX ORDER — LEVOCETIRIZINE DIHYDROCHLORIDE 5 MG/1
TABLET, FILM COATED ORAL
Qty: 90 TABLET | Refills: 0 | Status: SHIPPED | OUTPATIENT
Start: 2023-03-02

## 2023-03-02 NOTE — TELEPHONE ENCOUNTER
Rx Refill Note  Requested Prescriptions     Pending Prescriptions Disp Refills   • levocetirizine (XYZAL) 5 MG tablet [Pharmacy Med Name: Levocetirizine Dihydrochloride Oral Tablet 5 MG] 90 tablet 0     Sig: TAKE 1 TABLET BY MOUTH IN THE EVENING      Last office visit with prescribing clinician: 5/27/2022   Last telemedicine visit with prescribing clinician: Visit date not found   Next office visit with prescribing clinician: Visit date not found                         Would you like a call back once the refill request has been completed: [] Yes [] No    If the office needs to give you a call back, can they leave a voicemail: [] Yes [] No    Maye Michael LPN  03/02/23, 08:27 EST

## 2023-03-15 ENCOUNTER — TELEPHONE (OUTPATIENT)
Dept: FAMILY MEDICINE CLINIC | Facility: CLINIC | Age: 53
End: 2023-03-15

## 2023-03-15 NOTE — TELEPHONE ENCOUNTER
Caller: Teena Jacobson    Relationship: Self    Best call back number: [phone]    What medication are you requesting: PER PCP    What are your current symptoms: VOMITING, DIARRHEA, DRY HEAVING, LAST VOMITED AT 2 PM AND IT WAS CLEAR LIQUID     How long have you been experiencing symptoms: 7 AM THIS MORNING     Have you had these symptoms before:    [] Yes  [x] No    Have you been treated for these symptoms before:   [] Yes  [x] No    If a prescription is needed, what is your preferred pharmacy and phone number: [facility] [id number] - [place], [place] - [facility] [address]  - [phone]  - [phone]      Additional notes: PATIENT STATES SHE WAS EXPOSED TO A STOMACH VIRUS BY HER GRANDCHILDREN. PATIENT WOULD LIKE A CALL TO KNOW WHEN MEDICATION IS SENT TO PHARMACY. PATIENT STATES AROUND 10 AM THIS MORNING SHE GOT OFF THE TOILET TO VOMIT AND ACCIDENTALLY HAD DIARRHEA ON SELF WHILE VOMITING IN TOILET.

## 2023-03-15 NOTE — TELEPHONE ENCOUNTER
Caller: Teena Jacobson    Relationship: Self    Best call back number: [phone]    What medication are you requesting: PCP DISCRETION    What are your current symptoms: VOMITING, DIARRHEA    How long have you been experiencing symptoms: 1 DAY     Have you had these symptoms before:    [x] Yes  [x] No    Have you been treated for these symptoms before:   [x] Yes  [x] No    If a prescription is needed, what is your preferred pharmacy and phone number:      [facility] [id number] - [place], [place] - [facility] [address] - [phone]  - [phone] FX    REQUESTING TO BE CALLED WHEN MEDICATION SENT TO PHARMACY; CAN LEAVE A MESSAGE        Additional notes: EXPOSED TO STOMACH VIRUS

## 2023-03-16 NOTE — TELEPHONE ENCOUNTER
Spoke with pt and she is feeling better today. She will call back and schedule an appointment if needed.

## 2023-03-25 DIAGNOSIS — Z76.0 MEDICATION REFILL: ICD-10-CM

## 2023-03-25 DIAGNOSIS — I10 ESSENTIAL HYPERTENSION: ICD-10-CM

## 2023-03-27 RX ORDER — LISINOPRIL AND HYDROCHLOROTHIAZIDE 12.5; 1 MG/1; MG/1
TABLET ORAL
Qty: 90 TABLET | Refills: 0 | Status: SHIPPED | OUTPATIENT
Start: 2023-03-27

## 2023-04-04 RX ORDER — MIRTAZAPINE 7.5 MG/1
TABLET, FILM COATED ORAL
Qty: 30 TABLET | Refills: 0 | Status: SHIPPED | OUTPATIENT
Start: 2023-04-04

## 2023-05-04 RX ORDER — MIRTAZAPINE 7.5 MG/1
TABLET, FILM COATED ORAL
Qty: 30 TABLET | Refills: 0 | Status: SHIPPED | OUTPATIENT
Start: 2023-05-04

## 2023-05-08 DIAGNOSIS — J30.9 ALLERGIC RHINITIS, UNSPECIFIED SEASONALITY, UNSPECIFIED TRIGGER: ICD-10-CM

## 2023-05-08 DIAGNOSIS — Z76.0 MEDICATION REFILL: ICD-10-CM

## 2023-05-08 RX ORDER — MONTELUKAST SODIUM 10 MG/1
TABLET ORAL
Qty: 90 TABLET | Refills: 0 | Status: SHIPPED | OUTPATIENT
Start: 2023-05-08 | End: 2023-05-08

## 2023-05-08 RX ORDER — MONTELUKAST SODIUM 10 MG/1
TABLET ORAL
Qty: 90 TABLET | Refills: 0 | Status: SHIPPED | OUTPATIENT
Start: 2023-05-08

## 2023-05-31 ENCOUNTER — OFFICE VISIT (OUTPATIENT)
Dept: FAMILY MEDICINE CLINIC | Facility: CLINIC | Age: 53
End: 2023-05-31

## 2023-05-31 VITALS
TEMPERATURE: 98.4 F | BODY MASS INDEX: 36.93 KG/M2 | OXYGEN SATURATION: 96 % | SYSTOLIC BLOOD PRESSURE: 110 MMHG | WEIGHT: 195.6 LBS | HEIGHT: 61 IN | DIASTOLIC BLOOD PRESSURE: 70 MMHG | HEART RATE: 85 BPM

## 2023-05-31 DIAGNOSIS — E11.9 TYPE 2 DIABETES MELLITUS WITHOUT COMPLICATION, WITHOUT LONG-TERM CURRENT USE OF INSULIN: ICD-10-CM

## 2023-05-31 DIAGNOSIS — I10 ESSENTIAL HYPERTENSION: Chronic | ICD-10-CM

## 2023-05-31 DIAGNOSIS — M67.479 GANGLION CYST OF FOOT: ICD-10-CM

## 2023-05-31 DIAGNOSIS — Z00.00 ANNUAL PHYSICAL EXAM: Primary | ICD-10-CM

## 2023-05-31 DIAGNOSIS — Z91.09 ENVIRONMENTAL ALLERGIES: ICD-10-CM

## 2023-05-31 LAB
EXPIRATION DATE: NORMAL
HBA1C MFR BLD: 5.8 %
Lab: NORMAL

## 2023-05-31 RX ORDER — TRIAMCINOLONE ACETONIDE 1 MG/G
CREAM TOPICAL
COMMUNITY
Start: 2023-05-23

## 2023-05-31 RX ORDER — LEVOCETIRIZINE DIHYDROCHLORIDE 5 MG/1
TABLET, FILM COATED ORAL EVERY 24 HOURS
COMMUNITY
End: 2023-05-31 | Stop reason: SDUPTHER

## 2023-05-31 RX ORDER — ALCLOMETASONE DIPROPIONATE 0.5 MG/G
CREAM TOPICAL
COMMUNITY
End: 2023-06-03 | Stop reason: SDUPTHER

## 2023-05-31 RX ORDER — DEXAMETHASONE SODIUM PHOSPHATE 4 MG/ML
4 INJECTION, SOLUTION INTRA-ARTICULAR; INTRALESIONAL; INTRAMUSCULAR; INTRAVENOUS; SOFT TISSUE ONCE
Status: COMPLETED | OUTPATIENT
Start: 2023-05-31 | End: 2023-05-31

## 2023-05-31 RX ORDER — SEMAGLUTIDE 2.68 MG/ML
INJECTION, SOLUTION SUBCUTANEOUS
COMMUNITY
Start: 2023-05-04

## 2023-05-31 RX ORDER — DAPAGLIFLOZIN 10 MG/1
TABLET, FILM COATED ORAL EVERY 24 HOURS
COMMUNITY
End: 2023-06-03 | Stop reason: SDUPTHER

## 2023-05-31 RX ADMIN — DEXAMETHASONE SODIUM PHOSPHATE 4 MG: 4 INJECTION, SOLUTION INTRA-ARTICULAR; INTRALESIONAL; INTRAMUSCULAR; INTRAVENOUS; SOFT TISSUE at 10:16

## 2023-05-31 NOTE — PROGRESS NOTES
Follow Up Office Note     Patient Name: Teena Jacobson  : 1970   MRN: [id number]     Chief Complaint:    Chief Complaint   Patient presents with    Annual Exam       History of Present Illness: Teena Jacobson is a 53 y.o. female who presents today for annual physical exam.    Patient presents for reevaluation and management of chronic DM2.  Patient's last A1c was 6.5. Patient reports compliance with diabetes medications.    Patient complaining of acute exacerbation of chronic environmental allergies.  Patient has tried and failed Xyzal, Flonase and Singulair.      Subjective      I have reviewed and the following portions of the patient's history were updated as appropriate: past family history, past medical history, past social history, past surgical history and problem list.    Review of Systems:   Review of Systems   Constitutional: Negative.    HENT:  Positive for congestion, ear pain, postnasal drip and sinus pressure. Negative for sore throat.    Eyes: Negative.    Respiratory: Negative.     Cardiovascular: Negative.    Gastrointestinal: Negative.    Endocrine: Negative for polydipsia, polyphagia and polyuria.   Genitourinary: Negative.    Musculoskeletal:  Positive for arthralgias (left foot).   Allergic/Immunologic: Positive for environmental allergies.   Neurological: Negative.       Past Medical History:   Past Medical History:   Diagnosis Date    Abnormal ECG 2020    Anxiety     Breast cancer 2009    Cancer     breast, chemo, radiation done    Depression     Diabetes mellitus     Fibroid     H/O mastectomy     Hypertension          Medications:     Current Outpatient Medications:     BD Pen Needle Edita 2nd Gen 32G X 4 MM misc, USE 1 PEN NEEDLE FOR WEEKLY INJECTIONS AS DIRECTED, Disp: , Rfl:     carvedilol (COREG) 12.5 MG tablet, Take 1 tablet by mouth 2 (Two) Times a Day., Disp: , Rfl:     chlorhexidine (PERIDEX) 0.12 % solution, RINSE WITH 15ML FOR 1 MINUTE AND SPIT  OUT AS DIRECTED, Disp: , Rfl:     Cholecalciferol (Vitamin D) 50 MCG (2000 UT) capsule, , Disp: , Rfl:     doxycycline (PERIOSTAT) 20 MG tablet, doxycycline hyclate 20 mg tablet  TAKE 2 TABLETS PO QD, Disp: , Rfl:     Farxiga 10 MG tablet, Take 10 mg by mouth Every Morning., Disp: , Rfl:     fluticasone (FLONASE) 50 MCG/ACT nasal spray, 2 sprays by Each Nare route Daily., Disp: 16 g, Rfl: 2    hydrocortisone 2.5 % cream, APPLY A THIN LAYER TO THE AFFECTED AREA(S) ON THE FACE TOPICALLY TWICE DAILY FOR 2 WEEKS AS NEEDED FOR FLARES, Disp: , Rfl: 3    levocetirizine (XYZAL) 5 MG tablet, TAKE 1 TABLET BY MOUTH IN THE EVENING, Disp: 90 tablet, Rfl: 0    lisinopril-hydrochlorothiazide (PRINZIDE,ZESTORETIC) 10-12.5 MG per tablet, TAKE 1 TABLET BY MOUTH EVERY DAY, Disp: 90 tablet, Rfl: 0    metFORMIN (GLUCOPHAGE) 500 MG tablet, Take 1 tablet by mouth 2 (Two) Times a Day With Meals., Disp: , Rfl:     mirtazapine (REMERON) 7.5 MG tablet, TAKE ONE TABLET BY MOUTH EVERY EVENING, Disp: 30 tablet, Rfl: 0    montelukast (SINGULAIR) 10 MG tablet, TAKE 1 TABLET BY MOUTH AT BEDTIME, Disp: 90 tablet, Rfl: 0    multivitamin (THERAGRAN) tablet tablet, Multiple Vitamin, Disp: , Rfl:     nitroglycerin (NITROSTAT) 0.4 MG SL tablet, PLACE ONE TABLET UNDER TONGUE FOR CHEST PAIN, MAY REPEAT EVERY 5 MINUTES, AFTER 3 DOSES CALL 911, Disp: , Rfl:     Ozempic, 2 MG/DOSE, 8 MG/3ML solution pen-injector, INJECT 2 MG UNDER THE SKIN ONE DAY A WEEK, Disp: , Rfl:     ranolazine (RANEXA) 500 MG 12 hr tablet, Take 1 tablet by mouth 2 (Two) Times a Day., Disp: , Rfl:     rosuvastatin (CRESTOR) 40 MG tablet, Take 1 tablet by mouth Daily., Disp: , Rfl:     tretinoin (RETIN-A) 0.1 % cream, APPLY AT BEDTIME TO AFFECTED AREA ON FACE, Disp: , Rfl:     triamcinolone (KENALOG) 0.1 % cream, , Disp: , Rfl:     Allergies:   Allergies   Allergen Reactions    Wellbutrin [Bupropion] Rash         5/31/2023     8:11 AM   PHQ-2/PHQ-9 Depression Screening   Little Interest  "or Pleasure in Doing Things 0-->not at all   Feeling Down, Depressed or Hopeless 1-->several days   PHQ-9: Brief Depression Severity Measure Score 1      OMAYRA-7  Feeling nervous, anxious or on edge: Not at all  Not being able to stop or control worrying: Several days  Worrying too much about different things: Several days  Trouble Relaxing: Not at all  Being so restless that it is hard to sit still: Not at all  Feeling afraid as if something awful might happen: Not at all  Becoming easily annoyed or irritable: Not at all  OMAYRA 7 Total Score: 2  If you checked any problems, how difficult have these problems made it for you to do your work, take care of things at home, or get along with other people: Not difficult at all          Objective     Physical Exam:  Vital Signs:   Vitals:    05/31/23 0814   BP: 110/70   Pulse: 85   Temp: 98.4 °F (36.9 °C)   TempSrc: Infrared   SpO2: 96%   Weight: 88.7 kg (195 lb 9.6 oz)   Height: 154.9 cm (60.98\")   PainSc: 0-No pain     Body mass index is 36.98 kg/m².     Physical Exam  Vitals and nursing note reviewed. Exam conducted with a chaperone present.   Constitutional:       General: She is not in acute distress.     Appearance: Normal appearance. She is well-developed and well-groomed. She is not ill-appearing, toxic-appearing or diaphoretic.   HENT:      Head: Normocephalic and atraumatic.      Right Ear: External ear normal. No mastoid tenderness. Tympanic membrane is bulging.      Left Ear: External ear normal. No mastoid tenderness. Tympanic membrane is bulging.      Nose: Nose normal. Mucosal edema and congestion present.      Right Turbinates: Swollen.      Left Turbinates: Swollen.      Mouth/Throat:      Lips: Pink.      Mouth: Mucous membranes are moist.      Pharynx: Oropharynx is clear. Uvula midline. No posterior oropharyngeal erythema.   Neck:      Thyroid: No thyroid mass, thyromegaly or thyroid tenderness.   Cardiovascular:      Rate and Rhythm: Normal rate and " regular rhythm.      Pulses: Normal pulses.           Dorsalis pedis pulses are 2+ on the right side and 2+ on the left side.        Posterior tibial pulses are 1+ on the right side and 1+ on the left side.      Heart sounds: Normal heart sounds, S1 normal and S2 normal. No murmur heard.  Pulmonary:      Effort: Pulmonary effort is normal. No respiratory distress.      Breath sounds: Normal breath sounds.   Abdominal:      General: Bowel sounds are normal. There is no distension.      Palpations: Abdomen is soft.      Tenderness: There is no abdominal tenderness.   Musculoskeletal:         General: Normal range of motion.      Cervical back: Normal range of motion and neck supple.      Right lower leg: No edema.      Left lower leg: No edema.      Right foot: Normal range of motion.      Left foot: Normal range of motion.        Feet:    Feet:      Right foot:      Protective Sensation: 9 sites tested.  9 sites sensed.      Skin integrity: Skin integrity normal.      Left foot:      Protective Sensation: 9 sites tested.  9 sites sensed.      Skin integrity: Skin integrity normal.      Comments: Monofilament testing performed with normal findings.  Lymphadenopathy:      Cervical: No cervical adenopathy.   Skin:     General: Skin is warm and dry.      Capillary Refill: Capillary refill takes less than 2 seconds.      Findings: No rash.   Neurological:      General: No focal deficit present.      Mental Status: She is alert and oriented to person, place, and time.   Psychiatric:         Attention and Perception: Attention and perception normal.         Mood and Affect: Mood and affect normal.         Speech: Speech normal.         Behavior: Behavior normal. Behavior is cooperative.         Thought Content: Thought content normal.         Cognition and Memory: Cognition and memory normal.         Judgment: Judgment normal.       Assessment / Plan      Assessment/Plan:   Diagnoses and all orders for this visit:    1.  Annual physical exam (Primary)  -     Comprehensive Metabolic Panel; Future  -     CBC (No Diff); Future  -     Lipid Panel; Future  -     TSH; Future  -     Urinalysis With Culture If Indicated - Urine, Clean Catch; Future    2. Type 2 diabetes mellitus without complication, without long-term current use of insulin  -     POC Glycosylated Hemoglobin (Hb A1C)  -     Comprehensive Metabolic Panel; Future  -     Urinalysis With Culture If Indicated - Urine, Clean Catch; Future    3. Essential hypertension  -     Comprehensive Metabolic Panel; Future  -     CBC (No Diff); Future  -     TSH; Future  -     Urinalysis With Culture If Indicated - Urine, Clean Catch; Future    4. Environmental allergies  Assessment & Plan:  Acute exacerbation chronic allergies.  Dexamethasone injection today.  Continue current medications.    Orders:  -     dexamethasone (DECADRON) injection 4 mg    5. Ganglion cyst of foot  Assessment & Plan:  Recommend podiatry referral, patient declined at this time.         The patient is here for a health maintenance visit.  Currently, the patient consumes a mostly healthy diet and has an adequate exercise regimen. Screening lab work is ordered.  Immunizations are declined today and vaccine education is provided.  Advice and education is given regarding nutrition, aerobic exercise, routine dental evaluations, routine eye exams, reproductive health, cardiovascular risk reduction, sunscreen use, self skin examination (annual dermatology evaluations) and seat belt use (general overall safety).  Further recommendations after lab evaluation.  Annual wellness evaluations recommended.    Follow Up:   PRN and at next scheduled appointment(s) with PCP.    Discussed the nature of the medical condition(s) risks, complications, implications, management, safe and proper use of medications. Encouraged medication compliance, and keeping scheduled follow up appointments with me and any other providers.      RTC if  symptoms fail to improve, to ER if symptoms worsen.        *Dictated Utilizing Dragon Dictation   Please note that portions of this note were completed with a voice recognition program.   Part of this note may be an electronic transcription/translation of spoken language to printed text using the Dragon Dictation System. Spelling and/or grammatical errors may exist despite efforts at proofreading.        JAGDEEP Keen  [facility] [place]

## 2023-06-03 PROBLEM — M67.479 GANGLION CYST OF FOOT: Status: ACTIVE | Noted: 2023-06-03

## 2023-06-03 PROBLEM — Z91.09 ENVIRONMENTAL ALLERGIES: Status: ACTIVE | Noted: 2023-06-03

## 2023-06-03 PROBLEM — Z91.09 ENVIRONMENTAL ALLERGIES: Chronic | Status: ACTIVE | Noted: 2023-06-03

## 2023-06-03 NOTE — ASSESSMENT & PLAN NOTE
Acute exacerbation chronic allergies.  Dexamethasone injection today.  Continue current medications.

## 2023-06-06 RX ORDER — MIRTAZAPINE 7.5 MG/1
TABLET, FILM COATED ORAL
Qty: 30 TABLET | Refills: 0 | Status: SHIPPED | OUTPATIENT
Start: 2023-06-06

## 2023-06-09 RX ORDER — LEVOCETIRIZINE DIHYDROCHLORIDE 5 MG/1
TABLET, FILM COATED ORAL
Qty: 90 TABLET | Refills: 0 | Status: SHIPPED | OUTPATIENT
Start: 2023-06-09

## 2023-08-15 RX ORDER — MIRTAZAPINE 7.5 MG/1
TABLET, FILM COATED ORAL
Qty: 30 TABLET | Refills: 0 | Status: SHIPPED | OUTPATIENT
Start: 2023-08-15

## 2023-08-27 DIAGNOSIS — J30.9 ALLERGIC RHINITIS, UNSPECIFIED SEASONALITY, UNSPECIFIED TRIGGER: ICD-10-CM

## 2023-08-27 DIAGNOSIS — Z76.0 MEDICATION REFILL: ICD-10-CM

## 2023-08-28 RX ORDER — FLUTICASONE PROPIONATE 50 MCG
2 SPRAY, SUSPENSION (ML) NASAL DAILY
Qty: 16 G | Refills: 2 | Status: SHIPPED | OUTPATIENT
Start: 2023-08-28

## 2023-08-28 NOTE — TELEPHONE ENCOUNTER
Rx Refill Note  Requested Prescriptions     Pending Prescriptions Disp Refills    fluticasone (FLONASE) 50 MCG/ACT nasal spray 16 g 2     Si sprays by Each Nare route Daily.      Last office visit with prescribing clinician: 2023   Last telemedicine visit with prescribing clinician: Visit date not found   Next office visit with prescribing clinician: 2023                         Would you like a call back once the refill request has been completed: [] Yes [] No    If the office needs to give you a call back, can they leave a voicemail: [] Yes [] No    Farrah Ta  23, 08:37 EDT

## 2023-09-13 RX ORDER — LEVOCETIRIZINE DIHYDROCHLORIDE 5 MG/1
TABLET, FILM COATED ORAL
Qty: 90 TABLET | Refills: 0 | Status: SHIPPED | OUTPATIENT
Start: 2023-09-13

## 2023-09-13 RX ORDER — LEVOCETIRIZINE DIHYDROCHLORIDE 5 MG/1
TABLET, FILM COATED ORAL
Qty: 90 TABLET | Refills: 0 | Status: SHIPPED | OUTPATIENT
Start: 2023-09-13 | End: 2023-09-13

## 2023-09-19 RX ORDER — MIRTAZAPINE 7.5 MG/1
TABLET, FILM COATED ORAL
Qty: 30 TABLET | Refills: 0 | Status: SHIPPED | OUTPATIENT
Start: 2023-09-19

## 2023-10-23 RX ORDER — MIRTAZAPINE 7.5 MG/1
TABLET, FILM COATED ORAL
Qty: 30 TABLET | Refills: 0 | Status: SHIPPED | OUTPATIENT
Start: 2023-10-23

## 2023-11-11 DIAGNOSIS — J30.9 ALLERGIC RHINITIS, UNSPECIFIED SEASONALITY, UNSPECIFIED TRIGGER: ICD-10-CM

## 2023-11-11 DIAGNOSIS — I10 ESSENTIAL HYPERTENSION: ICD-10-CM

## 2023-11-11 DIAGNOSIS — Z76.0 MEDICATION REFILL: ICD-10-CM

## 2023-11-13 DIAGNOSIS — J30.9 ALLERGIC RHINITIS, UNSPECIFIED SEASONALITY, UNSPECIFIED TRIGGER: ICD-10-CM

## 2023-11-13 DIAGNOSIS — Z76.0 MEDICATION REFILL: ICD-10-CM

## 2023-11-13 RX ORDER — MONTELUKAST SODIUM 10 MG/1
TABLET ORAL
Qty: 90 TABLET | Refills: 0 | Status: SHIPPED | OUTPATIENT
Start: 2023-11-13 | End: 2023-11-13

## 2023-11-13 RX ORDER — MONTELUKAST SODIUM 10 MG/1
TABLET ORAL
Qty: 90 TABLET | Refills: 0 | Status: SHIPPED | OUTPATIENT
Start: 2023-11-13 | End: 2023-11-14 | Stop reason: SDUPTHER

## 2023-11-13 NOTE — TELEPHONE ENCOUNTER
Rx Refill Note  Requested Prescriptions     Pending Prescriptions Disp Refills    montelukast (SINGULAIR) 10 MG tablet [Pharmacy Med Name: Montelukast Sodium Oral Tablet 10 MG] 90 tablet 0     Sig: TAKE 1 TABLET BY MOUTH AT BEDTIME    lisinopril-hydrochlorothiazide (PRINZIDE,ZESTORETIC) 10-12.5 MG per tablet [Pharmacy Med Name: Lisinopril-hydroCHLOROthiazide Oral Tablet 10-12.5 MG] 90 tablet 0     Sig: TAKE 1 TABLET BY MOUTH EVERY DAY      Last office visit with prescribing clinician: 5/31/2023   Last telemedicine visit with prescribing clinician: Visit date not found   Next office visit with prescribing clinician: 11/11/2023                         Would you like a call back once the refill request has been completed: [] Yes [] No    If the office needs to give you a call back, can they leave a voicemail: [] Yes [] No    Farrah Ta  11/13/23, 13:41 EST

## 2023-11-14 RX ORDER — MONTELUKAST SODIUM 10 MG/1
TABLET ORAL
Qty: 90 TABLET | Refills: 0 | Status: SHIPPED | OUTPATIENT
Start: 2023-11-14

## 2023-11-14 RX ORDER — LISINOPRIL AND HYDROCHLOROTHIAZIDE 12.5; 1 MG/1; MG/1
TABLET ORAL
Qty: 90 TABLET | Refills: 0 | Status: SHIPPED | OUTPATIENT
Start: 2023-11-14

## 2023-11-29 RX ORDER — MIRTAZAPINE 7.5 MG/1
TABLET, FILM COATED ORAL
Qty: 30 TABLET | Refills: 0 | Status: SHIPPED | OUTPATIENT
Start: 2023-11-29

## 2023-11-30 ENCOUNTER — OFFICE VISIT (OUTPATIENT)
Dept: FAMILY MEDICINE CLINIC | Facility: CLINIC | Age: 53
End: 2023-11-30
Payer: COMMERCIAL

## 2023-11-30 ENCOUNTER — LAB (OUTPATIENT)
Dept: LAB | Facility: HOSPITAL | Age: 53
End: 2023-11-30
Payer: COMMERCIAL

## 2023-11-30 VITALS
BODY MASS INDEX: 36.95 KG/M2 | TEMPERATURE: 97.5 F | WEIGHT: 188.2 LBS | OXYGEN SATURATION: 98 % | HEART RATE: 79 BPM | HEIGHT: 60 IN | DIASTOLIC BLOOD PRESSURE: 74 MMHG | SYSTOLIC BLOOD PRESSURE: 116 MMHG

## 2023-11-30 DIAGNOSIS — Z00.00 ANNUAL PHYSICAL EXAM: ICD-10-CM

## 2023-11-30 DIAGNOSIS — Z91.09 ENVIRONMENTAL ALLERGIES: ICD-10-CM

## 2023-11-30 DIAGNOSIS — I10 ESSENTIAL HYPERTENSION: Primary | Chronic | ICD-10-CM

## 2023-11-30 DIAGNOSIS — Z23 NEED FOR INFLUENZA VACCINATION: ICD-10-CM

## 2023-11-30 DIAGNOSIS — I10 ESSENTIAL HYPERTENSION: Chronic | ICD-10-CM

## 2023-11-30 DIAGNOSIS — E11.9 TYPE 2 DIABETES MELLITUS WITHOUT COMPLICATION, WITHOUT LONG-TERM CURRENT USE OF INSULIN: ICD-10-CM

## 2023-11-30 LAB
ALBUMIN SERPL-MCNC: 4.5 G/DL (ref 3.5–5.2)
ALBUMIN/CREATININE RATIO, URINE: <30
ALBUMIN/GLOB SERPL: 1.6 G/DL
ALP SERPL-CCNC: 83 U/L (ref 39–117)
ALT SERPL W P-5'-P-CCNC: 21 U/L (ref 1–33)
ANION GAP SERPL CALCULATED.3IONS-SCNC: 10.7 MMOL/L (ref 5–15)
AST SERPL-CCNC: 20 U/L (ref 1–32)
BILIRUB SERPL-MCNC: 0.3 MG/DL (ref 0–1.2)
BUN SERPL-MCNC: 17 MG/DL (ref 6–20)
BUN/CREAT SERPL: 15.9 (ref 7–25)
CALCIUM SPEC-SCNC: 9.6 MG/DL (ref 8.6–10.5)
CHLORIDE SERPL-SCNC: 104 MMOL/L (ref 98–107)
CHOLEST SERPL-MCNC: 117 MG/DL (ref 0–200)
CO2 SERPL-SCNC: 27.3 MMOL/L (ref 22–29)
CREAT SERPL-MCNC: 1.07 MG/DL (ref 0.57–1)
DEPRECATED RDW RBC AUTO: 40 FL (ref 37–54)
EGFRCR SERPLBLD CKD-EPI 2021: 61.9 ML/MIN/1.73
ERYTHROCYTE [DISTWIDTH] IN BLOOD BY AUTOMATED COUNT: 13 % (ref 12.3–15.4)
EXPIRATION DATE: NORMAL
EXPIRATION DATE: NORMAL
GLOBULIN UR ELPH-MCNC: 2.9 GM/DL
GLUCOSE SERPL-MCNC: 101 MG/DL (ref 65–99)
HBA1C MFR BLD: 5.4 % (ref 4.5–5.7)
HCT VFR BLD AUTO: 40.5 % (ref 34–46.6)
HDLC SERPL-MCNC: 71 MG/DL (ref 40–60)
HGB BLD-MCNC: 13.1 G/DL (ref 12–15.9)
LDLC SERPL CALC-MCNC: 29 MG/DL (ref 0–100)
LDLC/HDLC SERPL: 0.4 {RATIO}
Lab: NORMAL
Lab: NORMAL
MCH RBC QN AUTO: 27.5 PG (ref 26.6–33)
MCHC RBC AUTO-ENTMCNC: 32.3 G/DL (ref 31.5–35.7)
MCV RBC AUTO: 84.9 FL (ref 79–97)
PLATELET # BLD AUTO: 284 10*3/MM3 (ref 140–450)
PMV BLD AUTO: 10.8 FL (ref 6–12)
POC CREATININE URINE: 100
POC MICROALBUMIN URINE: 10
POTASSIUM SERPL-SCNC: 4.3 MMOL/L (ref 3.5–5.2)
PROT SERPL-MCNC: 7.4 G/DL (ref 6–8.5)
RBC # BLD AUTO: 4.77 10*6/MM3 (ref 3.77–5.28)
SODIUM SERPL-SCNC: 142 MMOL/L (ref 136–145)
TRIGL SERPL-MCNC: 89 MG/DL (ref 0–150)
TSH SERPL DL<=0.05 MIU/L-ACNC: 2.99 UIU/ML (ref 0.27–4.2)
VLDLC SERPL-MCNC: 17 MG/DL (ref 5–40)
WBC NRBC COR # BLD AUTO: 6.01 10*3/MM3 (ref 3.4–10.8)

## 2023-11-30 PROCEDURE — 80050 GENERAL HEALTH PANEL: CPT

## 2023-11-30 PROCEDURE — 80061 LIPID PANEL: CPT

## 2023-11-30 RX ORDER — OLOPATADINE HYDROCHLORIDE 1 MG/ML
1 SOLUTION/ DROPS OPHTHALMIC DAILY
Qty: 5 ML | Refills: 0 | Status: SHIPPED | OUTPATIENT
Start: 2023-11-30

## 2023-11-30 NOTE — ASSESSMENT & PLAN NOTE
Diabetes is  stable and well-controlled . A1C today 5.4. I discussed discontinuing Metformin with patient, however she states that she wanted to wait until she saw her gynecologist first (provider who put her on Ozempic).  Dietary recommendations for ADA diet.  Regular aerobic exercise.  Reminded to get yearly retinal exam.  Diabetes will be reassessed in 3 months.

## 2023-11-30 NOTE — PROGRESS NOTES
Follow Up Office Note     Patient Name: Teena Jacobson  : 1970   MRN: [id number]     Chief Complaint:    Chief Complaint   Patient presents with    Essential hypertension     F/u    Diabetes       History of Present Illness: Teena Jacobson is a 53 y.o. female who presents today for reevaluation and management of chronic hypertension.  Patient's blood pressure has been stable and controlled on current medications.    Patient also presents for reevaluation and management of chronic type 2 diabetes mellitus.  Patient's last A1c was 5.8.    Patient complaining of acute exacerbation of chronic allergies.  She reports compliance with levocetirizine, Singulair and Flonase.  However, patient states she has not been using Flonase daily.      Subjective      I have reviewed and the following portions of the patient's history were updated as appropriate: past family history, past medical history, past social history, past surgical history and problem list.    Review of Systems:   Review of Systems   Constitutional: Negative.    HENT:  Positive for congestion and rhinorrhea. Negative for sinus pain and sore throat.    Eyes:  Positive for redness and itching.        Watery eyes   Respiratory: Negative.     Cardiovascular: Negative.    Neurological: Negative.         Past Medical History:   Past Medical History:   Diagnosis Date    Abnormal ECG 2020    Anxiety     Breast cancer 2009    Cancer     breast, chemo, radiation done    Depression     Diabetes mellitus     Fibroid     H/O mastectomy     Hypertension          Medications:     Current Outpatient Medications:     BD Pen Needle Edita 2nd Gen 32G X 4 MM misc, USE 1 PEN NEEDLE FOR WEEKLY INJECTIONS AS DIRECTED, Disp: , Rfl:     carvedilol (COREG) 12.5 MG tablet, Take 1 tablet by mouth 2 (Two) Times a Day., Disp: , Rfl:     chlorhexidine (PERIDEX) 0.12 % solution, RINSE WITH 15ML FOR 1 MINUTE AND SPIT OUT AS DIRECTED, Disp: , Rfl:      Cholecalciferol (Vitamin D) 50 MCG (2000 UT) capsule, , Disp: , Rfl:     doxycycline (PERIOSTAT) 20 MG tablet, doxycycline hyclate 20 mg tablet  TAKE 2 TABLETS PO QD, Disp: , Rfl:     Farxiga 10 MG tablet, Take 10 mg by mouth Every Morning., Disp: , Rfl:     fluticasone (FLONASE) 50 MCG/ACT nasal spray, 2 sprays by Each Nare route Daily., Disp: 16 g, Rfl: 2    hydrocortisone 2.5 % cream, APPLY A THIN LAYER TO THE AFFECTED AREA(S) ON THE FACE TOPICALLY TWICE DAILY FOR 2 WEEKS AS NEEDED FOR FLARES, Disp: , Rfl: 3    levocetirizine (XYZAL) 5 MG tablet, TAKE 1 TABLET BY MOUTH IN THE EVENING, Disp: 90 tablet, Rfl: 0    lisinopril-hydrochlorothiazide (PRINZIDE,ZESTORETIC) 10-12.5 MG per tablet, TAKE 1 TABLET BY MOUTH EVERY DAY, Disp: 90 tablet, Rfl: 0    metFORMIN (GLUCOPHAGE) 500 MG tablet, Take 1 tablet by mouth 2 (Two) Times a Day With Meals., Disp: , Rfl:     mirtazapine (REMERON) 7.5 MG tablet, TAKE ONE TABLET BY MOUTH EVERY EVENING, Disp: 30 tablet, Rfl: 0    montelukast (SINGULAIR) 10 MG tablet, TAKE 1 TABLET BY MOUTH AT BEDTIME, Disp: 90 tablet, Rfl: 0    multivitamin (THERAGRAN) tablet tablet, Multiple Vitamin, Disp: , Rfl:     nitroglycerin (NITROSTAT) 0.4 MG SL tablet, PLACE ONE TABLET UNDER TONGUE FOR CHEST PAIN, MAY REPEAT EVERY 5 MINUTES, AFTER 3 DOSES CALL 911, Disp: , Rfl:     Ozempic, 2 MG/DOSE, 8 MG/3ML solution pen-injector, INJECT 2 MG UNDER THE SKIN ONE DAY A WEEK, Disp: , Rfl:     ranolazine (RANEXA) 500 MG 12 hr tablet, Take 1 tablet by mouth 2 (Two) Times a Day., Disp: , Rfl:     rosuvastatin (CRESTOR) 40 MG tablet, Take 1 tablet by mouth Daily., Disp: , Rfl:     tretinoin (RETIN-A) 0.1 % cream, APPLY AT BEDTIME TO AFFECTED AREA ON FACE, Disp: , Rfl:     triamcinolone (KENALOG) 0.1 % cream, , Disp: , Rfl:     olopatadine (PATANOL) 0.1 % ophthalmic solution, Administer 1 drop to both eyes Daily., Disp: 5 mL, Rfl: 0    Allergies:   Allergies   Allergen Reactions    Wellbutrin [Bupropion] Rash  "        Objective     Physical Exam:  Vital Signs:   Vitals:    11/30/23 0824   BP: 116/74   Pulse: 79   Temp: 97.5 °F (36.4 °C)   TempSrc: Infrared   SpO2: 98%   Weight: 85.4 kg (188 lb 3.2 oz)   Height: 152.4 cm (60\")   PainSc: 0-No pain     Body mass index is 36.76 kg/m².     Physical Exam  Vitals and nursing note reviewed.   Constitutional:       General: She is not in acute distress.     Appearance: Normal appearance. She is well-developed. She is not ill-appearing, toxic-appearing or diaphoretic.   HENT:      Head: Normocephalic and atraumatic.      Right Ear: Tympanic membrane is bulging. Tympanic membrane is not erythematous.      Left Ear: Tympanic membrane is bulging. Tympanic membrane is not erythematous.      Nose: Mucosal edema and congestion present.      Mouth/Throat:      Lips: Pink.      Mouth: Mucous membranes are moist.      Pharynx: Oropharynx is clear. No posterior oropharyngeal erythema.   Eyes:      General: Lids are normal.         Right eye: No discharge.         Left eye: No discharge.      Conjunctiva/sclera:      Right eye: Right conjunctiva is injected.      Left eye: Left conjunctiva is injected.   Cardiovascular:      Rate and Rhythm: Normal rate and regular rhythm.      Heart sounds: Normal heart sounds.   Pulmonary:      Effort: Pulmonary effort is normal. No respiratory distress.      Breath sounds: Normal breath sounds. No stridor. No wheezing.   Skin:     General: Skin is warm and dry.   Neurological:      General: No focal deficit present.      Mental Status: She is alert and oriented to person, place, and time.   Psychiatric:         Mood and Affect: Mood normal.         Behavior: Behavior normal. Behavior is cooperative.         Thought Content: Thought content normal.         Judgment: Judgment normal.         Assessment / Plan      Assessment/Plan:   Diagnoses and all orders for this visit:    1. Essential hypertension (Primary)  Assessment & Plan:  Hypertension is  stable " and controlled .  Continue current treatment regimen.  Dietary sodium restriction.  Weight loss.  Regular aerobic exercise.  Continue current medications.  Ambulatory blood pressure monitoring.  Blood pressure will be reassessed in 3 months.    Orders:  -     Comprehensive Metabolic Panel; Future  -     CBC (No Diff); Future  -     Lipid Panel; Future    2. Type 2 diabetes mellitus without complication, without long-term current use of insulin  Assessment & Plan:  Diabetes is  stable and well-controlled . A1C today 5.4. I discussed discontinuing Metformin with patient, however she states that she wanted to wait until she saw her gynecologist first (provider who put her on Ozempic).  Dietary recommendations for ADA diet.  Regular aerobic exercise.  Reminded to get yearly retinal exam.  Diabetes will be reassessed in 3 months.    Orders:  -     POC Glycosylated Hemoglobin (Hb A1C)  -     POC Microalbumin  -     Comprehensive Metabolic Panel; Future    3. Environmental allergies  -     olopatadine (PATANOL) 0.1 % ophthalmic solution; Administer 1 drop to both eyes Daily.  Dispense: 5 mL; Refill: 0    4. Need for influenza vaccination  -     Fluzone (or Fluarix & Flulaval for VFC) >6mos           Follow Up:   PRN and at next scheduled appointment(s) with PCP.    Discussed the nature of the medical condition(s) risks, complications, implications, management, safe and proper use of medications. Encouraged medication compliance, and keeping scheduled follow up appointments with me and any other providers.      RTC if symptoms fail to improve, to ER if symptoms worsen.        *Dictated Utilizing Dragon Dictation   Please note that portions of this note were completed with a voice recognition program.   Part of this note may be an electronic transcription/translation of spoken language to printed text using the Dragon Dictation System. Spelling and/or grammatical errors may exist despite efforts at proofreading.      NOTE TO  PATIENT: The 21st Century Cures Act makes medical notes like these available to patients in the interest of transparency. However, be advised this is a medical document. It is intended as peer to peer communication. It is written in medical language and may contain abbreviations or verbiage that are unfamiliar. It may appear blunt or direct. Medical documents are intended to carry relevant information, facts as evident, and the clinical opinion of the practitioner.      JAGDEEP Keen  [facility] [place]

## 2023-11-30 NOTE — ASSESSMENT & PLAN NOTE
Hypertension is  stable and controlled .  Continue current treatment regimen.  Dietary sodium restriction.  Weight loss.  Regular aerobic exercise.  Continue current medications.  Ambulatory blood pressure monitoring.  Blood pressure will be reassessed in 3 months.

## 2023-12-11 RX ORDER — LEVOCETIRIZINE DIHYDROCHLORIDE 5 MG/1
TABLET, FILM COATED ORAL
Qty: 90 TABLET | Refills: 0 | Status: SHIPPED | OUTPATIENT
Start: 2023-12-11 | End: 2023-12-11

## 2023-12-11 RX ORDER — LEVOCETIRIZINE DIHYDROCHLORIDE 5 MG/1
TABLET, FILM COATED ORAL
Qty: 90 TABLET | Refills: 0 | Status: SHIPPED | OUTPATIENT
Start: 2023-12-11

## 2024-01-02 RX ORDER — MIRTAZAPINE 7.5 MG/1
TABLET, FILM COATED ORAL
Qty: 30 TABLET | Refills: 0 | Status: SHIPPED | OUTPATIENT
Start: 2024-01-02

## 2024-01-17 DIAGNOSIS — Z91.09 ENVIRONMENTAL ALLERGIES: ICD-10-CM

## 2024-01-17 RX ORDER — OLOPATADINE HYDROCHLORIDE 1 MG/ML
SOLUTION/ DROPS OPHTHALMIC
Qty: 5 ML | Refills: 0 | Status: SHIPPED | OUTPATIENT
Start: 2024-01-17

## 2024-01-31 ENCOUNTER — OFFICE VISIT (OUTPATIENT)
Dept: OBSTETRICS AND GYNECOLOGY | Facility: CLINIC | Age: 54
End: 2024-01-31
Payer: COMMERCIAL

## 2024-01-31 VITALS
DIASTOLIC BLOOD PRESSURE: 70 MMHG | BODY MASS INDEX: 37.3 KG/M2 | WEIGHT: 190 LBS | SYSTOLIC BLOOD PRESSURE: 110 MMHG | HEIGHT: 60 IN

## 2024-01-31 DIAGNOSIS — Z01.419 WELL WOMAN EXAM WITH ROUTINE GYNECOLOGICAL EXAM: Primary | ICD-10-CM

## 2024-01-31 DIAGNOSIS — N90.89 VULVAR IRRITATION: ICD-10-CM

## 2024-01-31 DIAGNOSIS — Z80.3 FAMILY HISTORY OF BREAST CANCER: ICD-10-CM

## 2024-01-31 DIAGNOSIS — N39.3 SUI (STRESS URINARY INCONTINENCE, FEMALE): ICD-10-CM

## 2024-01-31 RX ORDER — CEFADROXIL 500 MG/1
CAPSULE ORAL
COMMUNITY
Start: 2023-12-19

## 2024-01-31 RX ORDER — FLUCONAZOLE 150 MG/1
TABLET ORAL
COMMUNITY
Start: 2024-01-11

## 2024-01-31 RX ORDER — BIOTIN/SILICON DIOX/L-CYSTEINE 5000MCG-10
TABLET, EXTENDED RELEASE ORAL
COMMUNITY
Start: 2023-11-01

## 2024-01-31 RX ORDER — ALCLOMETASONE DIPROPIONATE 0.5 MG/G
CREAM TOPICAL
COMMUNITY
Start: 2024-01-17

## 2024-01-31 RX ORDER — MIRTAZAPINE 7.5 MG/1
TABLET, FILM COATED ORAL
Qty: 30 TABLET | Refills: 0 | Status: SHIPPED | OUTPATIENT
Start: 2024-01-31

## 2024-01-31 NOTE — PROGRESS NOTES
Subjective   Chief Complaint   Patient presents with    Annual Exam     No c/c     Teena Gracia Jacobson is a 53 y.o. year old  menopausal female presenting to be seen for her annual exam.  This past year she has not been on hormone replacement therapy.  There has not been vaginal bleeding in the last 12 months.  Menopausal symptoms are not present.    SEXUAL Hx:  She is not currently sexually active.  In the past year there she has not been sexually active.    Condoms are not needed because she is not sexually active.  She would not like to be screened for STD's at today's exam.  [place] is painful: not asked  HEALTH Hx:  She exercises regularly: no (but is planning to start exercising more).  She wears her seat belt: yes.  She has concerns about domestic violence: no.    OTHER THINGS SHE WANTS TO DISCUSS TODAY:  She has been taking an antibiotic more frequently for acne and thinks she has been getting yeast infections. She last took diflucan rx by dermatologist on Monday. She reports more so itching externally.  She does admit to having urinary incontinence mostly with activities.  She wears poise pads.  She still follows with her oncologist for history of breast cancer requiring lumpectomy and chemoradiation.  Her next appointment with him is in March.    Her  did pass away about 3 years ago.  She reports she is doing a little bit better and is feeling more open to dating at this time.    She also has questions about how to properly clean her vaginal vulvar area.    The following portions of the patient's history were reviewed and updated as appropriate:problem list, current medications, allergies, past family history, past medical history, past social history, and past surgical history.    Social History    Tobacco Use      Smoking status: Never      Smokeless tobacco: Never      Review of Systems  Constitutional POS: nothing reported    NEG: anorexia or night sweats   Genitourinary POS: JEWELS  "is present and it IS effecting her ADL's    NEG: dysuria or hematuria      Gastointestinal POS: nothing reported    NEG: bloating, change in bowel habits, melena, or reflux symptoms   Integument POS: nothing reported    NEG: moles that are changing in size, shape, color or rashes   Breast POS: nothing reported    NEG: persistent breast lump, skin dimpling, or nipple discharge        Objective   /70 (BP Location: Right arm, Patient Position: Sitting, Cuff Size: Adult)   Ht 152.4 cm (60\")   Wt 86.2 kg (190 lb)   BMI 37.11 kg/m²     General:  well developed; well nourished  no acute distress   Skin:  No suspicious lesions seen   Thyroid: normal to inspection and palpation   Breasts:  Examined in supine position  Symmetric without masses or skin dimpling  Nipples normal without inversion, lesions or discharge  There are no palpable axillary nodes  Mastectomy site well healed without palpable abnormalities   Abdomen: soft, non-tender; no masses  no umbilical or inguinal hernias are present  no hepato-splenomegaly   Pelvis: Clinical staff was present for exam  External genitalia:  normal appearance of the external genitalia including Bartholin's and [place]'s glands. There is some vulvar atrophy present and excoriation above clitoral mcnamara  :  urethral meatus normal;  Vaginal:  atrophic mucosal changes are present;  Cervix:  normal appearance.  Uterus:  normal size, shape and consistency.  Adnexa:  normal bimanual exam of the adnexa.  Rectal:  digital rectal exam not performed; anus visually normal appearing.        Assessment   Normal GYN exam in menopause  Vulvar irritation   She is up to date on all relevant gynecologic and colorectal screenings   JEWELS - affecting ADLs  History of breast cancer s/p left lumpectomy in ~ 2009  History of endometrial ablation        Plan   Pap was done today.  If she does not receive the results of the Pap within 2 weeks  time, she was instructed to call to find out the results.  I " explained to Teena that the recommendations for Pap smear interval in a low risk patient's has lengthened to 3 years time.  I encouraged her to be seen yearly for a full physical exam including breast and pelvic exam even during the off years when PAP's will not be performed.  She was encouraged to get yearly mammograms.  She should report any palpable breast lump(s) or skin changes regardless of mammographic findings.  I explained to Teena that notification regarding her mammogram results will come from the center performing the study.  Our office will not be routinely calling with mammogram results.  It is her responsibility to make sure that the results from the mammogram are communicated to her by the breast center.  If she has any questions about the results, she is welcome to call our office anytime.  The following tests were ordered today:  one swab - yeast and BV .  It was explained to Teena that all lab test should be back within the one week after they are performed. She will be notified about the results, regardless of the findings. If she has not been contacted by the office within 2 weeks after the test has been performed, it is her responsibility to contact us to learn about her results.  Colon cancer screening UTD.  Reviewed proper vulvar hygiene including use of 100% cotton underwear, wearing no underwear at night for 2-3 nights a week, only using hands/water/unscented soaps to clean the area and avoidance of pantiliners if possible.  Reviewed importance of weight loss, timed voids and trial of PFPT. Reviewed options for surgery if she desires to improve.   Will try to request records from the [facility] system again for operative report from hysteroscopy endometrial ablation and her genetic testing.  Reviewed the importance of exercise 2-3 times per week with at least 20-30 minutes of cardio  The importance of keeping all planned follow-up and taking all medications as  prescribed was emphasized.  Follow up for annual exam in ~ one year    No orders of the defined types were placed in this encounter.       Orders Placed This Encounter   Procedures    OneSwab - Kit, Cervix, Vagina     Yeast and Bacterial vaginosis     Standing Status:   Future     Standing Expiration Date:   1/31/2025     Order Specific Question:   Release to patient     Answer:   Routine Release [[id number]]    Ambulatory Referral to Physical Therapy Evaluate and treat     Referral Priority:   Routine     Referral Type:   Physical Therapy     Referral Reason:   Specialty Services Required     Referred to Provider:   Cain Bowles, PT     Requested Specialty:   Physical Therapy     Number of Visits Requested:   1         This note was electronically signed.    Elke Epps MD  January 31, 2024

## 2024-02-02 LAB — REF LAB TEST METHOD: NORMAL

## 2024-02-06 ENCOUNTER — TELEPHONE (OUTPATIENT)
Dept: OBSTETRICS AND GYNECOLOGY | Facility: CLINIC | Age: 54
End: 2024-02-06
Payer: COMMERCIAL

## 2024-02-06 RX ORDER — TRIAMCINOLONE ACETONIDE 1 MG/G
OINTMENT TOPICAL
Qty: 80 G | Refills: 1 | Status: SHIPPED | OUTPATIENT
Start: 2024-02-06

## 2024-02-06 RX ORDER — TRIAMCINOLONE ACETONIDE 1 MG/G
OINTMENT TOPICAL
Qty: 80 G | Refills: 1 | Status: SHIPPED | OUTPATIENT
Start: 2024-02-06 | End: 2024-02-06 | Stop reason: SDUPTHER

## 2024-02-06 NOTE — TELEPHONE ENCOUNTER
Rx sent for kenalog ointment.   She should let us know if this does not help her symptoms.     Thanks, Elke Epps MD

## 2024-02-06 NOTE — TELEPHONE ENCOUNTER
----- Message from Charline Gallegos MA sent at 2/6/2024  8:52 AM EST -----  Pt informed and stated understanding.  Pt stated that she would like to have the steroid ointment sent to the [facility] on [address]

## 2024-02-06 NOTE — TELEPHONE ENCOUNTER
Pt informed and stated understanding.  Rx was sent to [facility] pharmacy so I resent it to [facility] on [address].

## 2024-02-20 DIAGNOSIS — J30.9 ALLERGIC RHINITIS, UNSPECIFIED SEASONALITY, UNSPECIFIED TRIGGER: ICD-10-CM

## 2024-02-20 DIAGNOSIS — Z76.0 MEDICATION REFILL: ICD-10-CM

## 2024-02-21 RX ORDER — MONTELUKAST SODIUM 10 MG/1
10 TABLET ORAL
Qty: 90 TABLET | Refills: 0 | Status: SHIPPED | OUTPATIENT
Start: 2024-02-21

## 2024-02-27 DIAGNOSIS — Z76.0 MEDICATION REFILL: ICD-10-CM

## 2024-02-27 DIAGNOSIS — I10 ESSENTIAL HYPERTENSION: ICD-10-CM

## 2024-02-28 RX ORDER — LEVOCETIRIZINE DIHYDROCHLORIDE 5 MG/1
5 TABLET, FILM COATED ORAL EVERY EVENING
Qty: 90 TABLET | Refills: 0 | Status: SHIPPED | OUTPATIENT
Start: 2024-02-28

## 2024-02-28 RX ORDER — LISINOPRIL AND HYDROCHLOROTHIAZIDE 12.5; 1 MG/1; MG/1
1 TABLET ORAL DAILY
Qty: 90 TABLET | Refills: 0 | Status: SHIPPED | OUTPATIENT
Start: 2024-02-28 | End: 2024-03-01 | Stop reason: SDUPTHER

## 2024-03-01 ENCOUNTER — OFFICE VISIT (OUTPATIENT)
Dept: FAMILY MEDICINE CLINIC | Facility: CLINIC | Age: 54
End: 2024-03-01
Payer: COMMERCIAL

## 2024-03-01 VITALS
OXYGEN SATURATION: 95 % | BODY MASS INDEX: 37.03 KG/M2 | TEMPERATURE: 97.7 F | SYSTOLIC BLOOD PRESSURE: 114 MMHG | HEIGHT: 60 IN | DIASTOLIC BLOOD PRESSURE: 74 MMHG | WEIGHT: 188.6 LBS | HEART RATE: 87 BPM

## 2024-03-01 DIAGNOSIS — E11.9 TYPE 2 DIABETES MELLITUS WITHOUT COMPLICATION, WITHOUT LONG-TERM CURRENT USE OF INSULIN: ICD-10-CM

## 2024-03-01 DIAGNOSIS — I10 ESSENTIAL HYPERTENSION: Primary | ICD-10-CM

## 2024-03-01 DIAGNOSIS — Z76.0 MEDICATION REFILL: ICD-10-CM

## 2024-03-01 LAB
EXPIRATION DATE: NORMAL
HBA1C MFR BLD: 5.7 % (ref 4.5–5.7)
Lab: NORMAL

## 2024-03-01 RX ORDER — FINERENONE 20 MG/1
TABLET, FILM COATED ORAL
COMMUNITY
Start: 2024-02-08

## 2024-03-01 RX ORDER — LISINOPRIL AND HYDROCHLOROTHIAZIDE 12.5; 1 MG/1; MG/1
1 TABLET ORAL DAILY
Qty: 90 TABLET | Refills: 0 | Status: SHIPPED | OUTPATIENT
Start: 2024-03-01

## 2024-03-04 NOTE — ASSESSMENT & PLAN NOTE
Hypertension is stable and controlled  Continue current treatment regimen.  Dietary sodium restriction.  Weight loss.  Regular aerobic exercise.  Ambulatory blood pressure monitoring.  Blood pressure will be reassessed in 6 months.

## 2024-03-04 NOTE — ASSESSMENT & PLAN NOTE
Diabetes is  stable and controlled . A1C today 5.7.  Continue current treatment regimen.  Recommended an ADA diet.  Regular aerobic exercise.  Reminded to get yearly retinal exam.  Diabetes will be reassessed in 6 months

## 2024-03-04 NOTE — PROGRESS NOTES
Follow Up Office Note   Patient Name: Teena Jacobson  : 1970   MRN: [id number]     Chief Complaint:    Chief Complaint   Patient presents with    Hypertension     Per patient she is here for a 3 month follow up       History of Present Illness:   Teena Jacobson is a 54 y.o. female who presents today for reevaluation and management of chronic hypertension.  Patient's blood pressure has been stable and controlled on lisinopril HCTZ.  Patient requesting refills today.    Patient has chronic type 2 diabetes mellitus which has been stable and controlled on current medications.  Her last A1c was 5.4.    Subjective   I have reviewed and the following portions of the patient's history were updated as appropriate: past family history, past medical history, past social history, past surgical history and problem list.    Review of Systems:   Review of Systems   Constitutional: Negative.    Respiratory: Negative.     Cardiovascular: Negative.    Endocrine: Negative for polydipsia, polyphagia and polyuria.   Neurological: Negative.         Past Medical History:   Past Medical History:   Diagnosis Date    Abnormal ECG 2020    Allergic 1996    Anemia 1/3/88    Anxiety     Breast cancer 2009    Surgery, Chemoradiation. Dr. Pinto - [facility]    Cancer     breast, chemo, radiation done    Depression     Diabetes mellitus     Fibroid     H/O mastectomy     Hypertension     Migraine headache 2020       Medications:     Current Outpatient Medications:     alclometasone (ACLOVATE) 0.05 % cream, , Disp: , Rfl:     BD Pen Needle Edita 2nd Gen 32G X 4 MM misc, USE 1 PEN NEEDLE FOR WEEKLY INJECTIONS AS DIRECTED, Disp: , Rfl:     carvedilol (COREG) 12.5 MG tablet, Take 1 tablet by mouth 2 (Two) Times a Day., Disp: , Rfl:     cefadroxil (DURICEF) 500 MG capsule, , Disp: , Rfl:     chlorhexidine (PERIDEX) 0.12 % solution, RINSE WITH 15ML FOR 1 MINUTE AND SPIT OUT AS DIRECTED,  Disp: , Rfl:     Cholecalciferol (Vitamin D) 50 MCG (2000 UT) capsule, , Disp: , Rfl:     Farxiga 10 MG tablet, Take 10 mg by mouth Every Morning., Disp: , Rfl:     Finerenone (Kerendia) 20 MG tablet, , Disp: , Rfl:     fluconazole (DIFLUCAN) 150 MG tablet, TAKE 1 TABLET BY MOUTH FOR YEAST INFECTION, REPEAT IN 3 DAYS IF NOT BETTER, Disp: , Rfl:     fluticasone (FLONASE) 50 MCG/ACT nasal spray, 2 sprays by Each Nare route Daily., Disp: 16 g, Rfl: 2    hydrocortisone 2.5 % cream, APPLY A THIN LAYER TO THE AFFECTED AREA(S) ON THE FACE TOPICALLY TWICE DAILY FOR 2 WEEKS AS NEEDED FOR FLARES, Disp: , Rfl: 3    levocetirizine (XYZAL) 5 MG tablet, Take 1 tablet by mouth Every Evening., Disp: 90 tablet, Rfl: 0    lisinopril-hydrochlorothiazide (PRINZIDE,ZESTORETIC) 10-12.5 MG per tablet, Take 1 tablet by mouth Daily., Disp: 90 tablet, Rfl: 0    metFORMIN (GLUCOPHAGE) 500 MG tablet, Take 1 tablet by mouth 2 (Two) Times a Day With Meals., Disp: , Rfl:     mirtazapine (REMERON) 7.5 MG tablet, TAKE ONE TABLET BY MOUTH EVERY EVENING, Disp: 30 tablet, Rfl: 0    montelukast (SINGULAIR) 10 MG tablet, Take 1 tablet by mouth every night at bedtime., Disp: 90 tablet, Rfl: 0    multivitamin (THERAGRAN) tablet tablet, Multiple Vitamin, Disp: , Rfl:     nitroglycerin (NITROSTAT) 0.4 MG SL tablet, PLACE ONE TABLET UNDER TONGUE FOR CHEST PAIN, MAY REPEAT EVERY 5 MINUTES, AFTER 3 DOSES CALL 911, Disp: , Rfl:     olopatadine (PATANOL) 0.1 % ophthalmic solution, INSTILL 1 DROP INTO BOTH EYES DAILY, Disp: 5 mL, Rfl: 0    Ozempic, 2 MG/DOSE, 8 MG/3ML solution pen-injector, INJECT 2 MG UNDER THE SKIN ONE DAY A WEEK, Disp: , Rfl:     ranolazine (RANEXA) 500 MG 12 hr tablet, Take 1 tablet by mouth 2 (Two) Times a Day., Disp: , Rfl:     rosuvastatin (CRESTOR) 40 MG tablet, Take 1 tablet by mouth Daily., Disp: , Rfl:     Specialty Vitamins Products (Toney Matrix 5000) tablet, , Disp: , Rfl:     tretinoin (RETIN-A) 0.1 % cream, APPLY AT BEDTIME TO  "AFFECTED AREA ON FACE, Disp: , Rfl:     triamcinolone (KENALOG) 0.1 % ointment, Used twice daily for 2 weeks and then taper to once daily for 2 weeks.  If itching remains well controlled using every other day as needed., Disp: 80 g, Rfl: 1    Allergies:   Allergies   Allergen Reactions    Wellbutrin [Bupropion] Rash         Objective   Physical Exam:  Vital Signs:   Vitals:    03/01/24 1305   BP: 114/74   Pulse: 87   Temp: 97.7 °F (36.5 °C)   TempSrc: Infrared   SpO2: 95%   Weight: 85.5 kg (188 lb 9.6 oz)   Height: 152.4 cm (60\")     Body mass index is 36.83 kg/m².     Physical Exam  Vitals and nursing note reviewed.   Constitutional:       General: She is not in acute distress.     Appearance: Normal appearance. She is well-developed. She is not ill-appearing, toxic-appearing or diaphoretic.   HENT:      Head: Normocephalic and atraumatic.   Cardiovascular:      Rate and Rhythm: Normal rate and regular rhythm.      Heart sounds: Normal heart sounds.   Pulmonary:      Effort: Pulmonary effort is normal. No respiratory distress.      Breath sounds: Normal breath sounds. No stridor. No wheezing.   Skin:     General: Skin is warm and dry.   Neurological:      General: No focal deficit present.      Mental Status: She is alert and oriented to person, place, and time.   Psychiatric:         Mood and Affect: Mood normal.         Behavior: Behavior normal. Behavior is cooperative.         Thought Content: Thought content normal.         Judgment: Judgment normal.         Assessment / Plan    Assessment/Plan:   Diagnoses and all orders for this visit:    1. Essential hypertension (Primary)  Assessment & Plan:  Hypertension is stable and controlled  Continue current treatment regimen.  Dietary sodium restriction.  Weight loss.  Regular aerobic exercise.  Ambulatory blood pressure monitoring.  Blood pressure will be reassessed in 6 months.    Orders:  -     lisinopril-hydrochlorothiazide (PRINZIDE,ZESTORETIC) 10-12.5 MG per " tablet; Take 1 tablet by mouth Daily.  Dispense: 90 tablet; Refill: 0    2. Type 2 diabetes mellitus without complication, without long-term current use of insulin  Assessment & Plan:  Diabetes is  stable and controlled . A1C today 5.7.  Continue current treatment regimen.  Recommended an ADA diet.  Regular aerobic exercise.  Reminded to get yearly retinal exam.  Diabetes will be reassessed in 6 months    Orders:  -     POC Glycosylated Hemoglobin (Hb A1C)    3. Medication refill  -     lisinopril-hydrochlorothiazide (PRINZIDE,ZESTORETIC) 10-12.5 MG per tablet; Take 1 tablet by mouth Daily.  Dispense: 90 tablet; Refill: 0       Discussed the nature of the medical condition(s) risks, complications, implications, management, safe and proper use of medications. Encouraged medication compliance, and keeping scheduled follow up appointments with me and any other providers.      RTC if symptoms fail to improve, to ER if symptoms worsen.      *Dictated Utilizing Dragon Dictation   Please note that portions of this note were completed with a voice recognition program.   Part of this note may be an electronic transcription/translation of spoken language to printed text using the Dragon Dictation System. Spelling and/or grammatical errors may exist despite efforts at proofreading.      NOTE TO PATIENT: The 21st Century Cures Act makes medical notes like these available to patients in the interest of transparency. However, be advised this is a medical document. It is intended as peer to peer communication. It is written in medical language and may contain abbreviations or verbiage that are unfamiliar. It may appear blunt or direct. Medical documents are intended to carry relevant information, facts as evident, and the clinical opinion of the practitioner.      JAGDEEP Keen  [facility] [place]

## 2024-03-06 RX ORDER — MIRTAZAPINE 7.5 MG/1
TABLET, FILM COATED ORAL
Qty: 30 TABLET | Refills: 0 | Status: SHIPPED | OUTPATIENT
Start: 2024-03-06

## 2024-04-09 RX ORDER — MIRTAZAPINE 7.5 MG/1
TABLET, FILM COATED ORAL
Qty: 30 TABLET | Refills: 0 | Status: SHIPPED | OUTPATIENT
Start: 2024-04-09

## 2024-05-07 RX ORDER — MIRTAZAPINE 7.5 MG/1
TABLET, FILM COATED ORAL
Qty: 30 TABLET | Refills: 0 | Status: SHIPPED | OUTPATIENT
Start: 2024-05-07

## 2024-06-05 RX ORDER — MIRTAZAPINE 7.5 MG/1
TABLET, FILM COATED ORAL
Qty: 30 TABLET | Refills: 0 | Status: SHIPPED | OUTPATIENT
Start: 2024-06-05

## 2024-06-06 ENCOUNTER — OFFICE VISIT (OUTPATIENT)
Dept: FAMILY MEDICINE CLINIC | Facility: CLINIC | Age: 54
End: 2024-06-06
Payer: COMMERCIAL

## 2024-06-06 ENCOUNTER — LAB (OUTPATIENT)
Dept: LAB | Facility: HOSPITAL | Age: 54
End: 2024-06-06
Payer: COMMERCIAL

## 2024-06-06 VITALS
TEMPERATURE: 98.6 F | HEART RATE: 72 BPM | SYSTOLIC BLOOD PRESSURE: 118 MMHG | HEIGHT: 60 IN | OXYGEN SATURATION: 98 % | WEIGHT: 180.4 LBS | BODY MASS INDEX: 35.42 KG/M2 | DIASTOLIC BLOOD PRESSURE: 60 MMHG

## 2024-06-06 DIAGNOSIS — E11.9 TYPE 2 DIABETES MELLITUS WITHOUT COMPLICATION, WITHOUT LONG-TERM CURRENT USE OF INSULIN: Chronic | ICD-10-CM

## 2024-06-06 DIAGNOSIS — Z00.00 ANNUAL PHYSICAL EXAM: Primary | ICD-10-CM

## 2024-06-06 DIAGNOSIS — Z91.09 ENVIRONMENTAL ALLERGIES: ICD-10-CM

## 2024-06-06 DIAGNOSIS — Z00.00 ANNUAL PHYSICAL EXAM: ICD-10-CM

## 2024-06-06 LAB
ALBUMIN SERPL-MCNC: 4.4 G/DL (ref 3.5–5.2)
ALBUMIN/GLOB SERPL: 1.5 G/DL
ALP SERPL-CCNC: 78 U/L (ref 39–117)
ALT SERPL W P-5'-P-CCNC: 19 U/L (ref 1–33)
ANION GAP SERPL CALCULATED.3IONS-SCNC: 13.6 MMOL/L (ref 5–15)
AST SERPL-CCNC: 19 U/L (ref 1–32)
BACTERIA UR QL AUTO: ABNORMAL /HPF
BILIRUB SERPL-MCNC: 0.6 MG/DL (ref 0–1.2)
BILIRUB UR QL STRIP: NEGATIVE
BUN SERPL-MCNC: 15 MG/DL (ref 6–20)
BUN/CREAT SERPL: 15.2 (ref 7–25)
CALCIUM SPEC-SCNC: 9.6 MG/DL (ref 8.6–10.5)
CHLORIDE SERPL-SCNC: 98 MMOL/L (ref 98–107)
CHOLEST SERPL-MCNC: 98 MG/DL (ref 0–200)
CLARITY UR: CLEAR
CO2 SERPL-SCNC: 23.4 MMOL/L (ref 22–29)
COLOR UR: YELLOW
CREAT SERPL-MCNC: 0.99 MG/DL (ref 0.57–1)
DEPRECATED RDW RBC AUTO: 41.9 FL (ref 37–54)
EGFRCR SERPLBLD CKD-EPI 2021: 67.5 ML/MIN/1.73
ERYTHROCYTE [DISTWIDTH] IN BLOOD BY AUTOMATED COUNT: 13.1 % (ref 12.3–15.4)
EXPIRATION DATE: NORMAL
GLOBULIN UR ELPH-MCNC: 3 GM/DL
GLUCOSE SERPL-MCNC: 99 MG/DL (ref 65–99)
GLUCOSE UR STRIP-MCNC: ABNORMAL MG/DL
HBA1C MFR BLD: 5 % (ref 4.5–5.7)
HCT VFR BLD AUTO: 37.4 % (ref 34–46.6)
HDLC SERPL-MCNC: 62 MG/DL (ref 40–60)
HGB BLD-MCNC: 12.4 G/DL (ref 12–15.9)
HGB UR QL STRIP.AUTO: NEGATIVE
HOLD SPECIMEN: NORMAL
HYALINE CASTS UR QL AUTO: ABNORMAL /LPF
KETONES UR QL STRIP: NEGATIVE
LDLC SERPL CALC-MCNC: 22 MG/DL (ref 0–100)
LDLC/HDLC SERPL: 0.38 {RATIO}
LEUKOCYTE ESTERASE UR QL STRIP.AUTO: ABNORMAL
Lab: NORMAL
MCH RBC QN AUTO: 29.2 PG (ref 26.6–33)
MCHC RBC AUTO-ENTMCNC: 33.2 G/DL (ref 31.5–35.7)
MCV RBC AUTO: 88.2 FL (ref 79–97)
NITRITE UR QL STRIP: NEGATIVE
PH UR STRIP.AUTO: 7 [PH] (ref 5–8)
PLATELET # BLD AUTO: 237 10*3/MM3 (ref 140–450)
PMV BLD AUTO: 11.1 FL (ref 6–12)
POTASSIUM SERPL-SCNC: 4.4 MMOL/L (ref 3.5–5.2)
PROT SERPL-MCNC: 7.4 G/DL (ref 6–8.5)
PROT UR QL STRIP: ABNORMAL
RBC # BLD AUTO: 4.24 10*6/MM3 (ref 3.77–5.28)
RBC # UR STRIP: ABNORMAL /HPF
REF LAB TEST METHOD: ABNORMAL
SODIUM SERPL-SCNC: 135 MMOL/L (ref 136–145)
SP GR UR STRIP: 1.03 (ref 1–1.03)
SQUAMOUS #/AREA URNS HPF: ABNORMAL /HPF
TRIGL SERPL-MCNC: 62 MG/DL (ref 0–150)
TSH SERPL DL<=0.05 MIU/L-ACNC: 2.83 UIU/ML (ref 0.27–4.2)
UROBILINOGEN UR QL STRIP: ABNORMAL
VLDLC SERPL-MCNC: 14 MG/DL (ref 5–40)
WBC # UR STRIP: ABNORMAL /HPF
WBC NRBC COR # BLD AUTO: 5.66 10*3/MM3 (ref 3.4–10.8)

## 2024-06-06 PROCEDURE — 80050 GENERAL HEALTH PANEL: CPT

## 2024-06-06 PROCEDURE — 80061 LIPID PANEL: CPT

## 2024-06-06 PROCEDURE — 81001 URINALYSIS AUTO W/SCOPE: CPT

## 2024-06-06 RX ORDER — TRIAMCINOLONE ACETONIDE 55 UG/1
2 SPRAY, METERED NASAL DAILY
Qty: 16.5 G | Refills: 0 | Status: SHIPPED | OUTPATIENT
Start: 2024-06-06

## 2024-06-06 NOTE — PROGRESS NOTES
Follow Up Office Note   Patient Name: Teena Jacobson  : 1970   MRN: [id number]     Chief Complaint:    Chief Complaint   Patient presents with    Annual Exam    Diabetes    Allergies       History of Present Illness:   Teena Jacobson is a 54 y.o. female who presents today for annual physical exam. Patient states that she feels well overall with the exception of chronic right nasal congestion x 2-3 weeks. She denies fever, chills, body aches.    Patient has chronic DM2 which has been stable and controlled on current medications.      Subjective   I have reviewed and the following portions of the patient's history were updated as appropriate: past family history, past medical history, past social history, past surgical history and problem list.    Review of Systems:   Review of Systems   Constitutional: Negative.    HENT:  Positive for congestion and sinus pressure. Negative for ear pain, postnasal drip, sinus pain and sore throat.    Eyes: Negative.    Respiratory: Negative.     Cardiovascular: Negative.    Gastrointestinal: Negative.    Genitourinary: Negative.    Musculoskeletal: Negative.    Neurological: Negative.    Psychiatric/Behavioral: Negative.          Past Medical History:   Past Medical History:   Diagnosis Date    Abnormal ECG 2020    Allergic 1996    Anemia 1/3/88    Anxiety     Breast cancer 2009    Surgery, Chemoradiation. Dr. Pinto - [facility]    Cancer     breast, chemo, radiation done    Depression     Diabetes mellitus     Fibroid     H/O mastectomy     Hypertension     Migraine headache 2020       Medications:     Current Outpatient Medications:     alclometasone (ACLOVATE) 0.05 % cream, , Disp: , Rfl:     BD Pen Needle Edita 2nd Gen 32G X 4 MM misc, USE 1 PEN NEEDLE FOR WEEKLY INJECTIONS AS DIRECTED, Disp: , Rfl:     carvedilol (COREG) 12.5 MG tablet, Take 1 tablet by mouth 2 (Two) Times a Day., Disp: , Rfl:     cefadroxil  (DURICEF) 500 MG capsule, , Disp: , Rfl:     chlorhexidine (PERIDEX) 0.12 % solution, RINSE WITH 15ML FOR 1 MINUTE AND SPIT OUT AS DIRECTED, Disp: , Rfl:     Cholecalciferol (Vitamin D) 50 MCG (2000 UT) capsule, , Disp: , Rfl:     Farxiga 10 MG tablet, Take 10 mg by mouth Every Morning., Disp: , Rfl:     Finerenone (Kerendia) 20 MG tablet, , Disp: , Rfl:     fluconazole (DIFLUCAN) 150 MG tablet, TAKE 1 TABLET BY MOUTH FOR YEAST INFECTION, REPEAT IN 3 DAYS IF NOT BETTER, Disp: , Rfl:     hydrocortisone 2.5 % cream, APPLY A THIN LAYER TO THE AFFECTED AREA(S) ON THE FACE TOPICALLY TWICE DAILY FOR 2 WEEKS AS NEEDED FOR FLARES, Disp: , Rfl: 3    levocetirizine (XYZAL) 5 MG tablet, Take 1 tablet by mouth Every Evening., Disp: 90 tablet, Rfl: 0    lisinopril-hydrochlorothiazide (PRINZIDE,ZESTORETIC) 10-12.5 MG per tablet, Take 1 tablet by mouth Daily., Disp: 90 tablet, Rfl: 0    metFORMIN (GLUCOPHAGE) 500 MG tablet, Take 1 tablet by mouth 2 (Two) Times a Day With Meals., Disp: , Rfl:     mirtazapine (REMERON) 7.5 MG tablet, TAKE ONE TABLET BY MOUTH EVERY EVENING, Disp: 30 tablet, Rfl: 0    montelukast (SINGULAIR) 10 MG tablet, Take 1 tablet by mouth every night at bedtime., Disp: 90 tablet, Rfl: 0    multivitamin (THERAGRAN) tablet tablet, Multiple Vitamin, Disp: , Rfl:     nitroglycerin (NITROSTAT) 0.4 MG SL tablet, PLACE ONE TABLET UNDER TONGUE FOR CHEST PAIN, MAY REPEAT EVERY 5 MINUTES, AFTER 3 DOSES CALL 911, Disp: , Rfl:     olopatadine (PATANOL) 0.1 % ophthalmic solution, INSTILL 1 DROP INTO BOTH EYES DAILY, Disp: 5 mL, Rfl: 0    Ozempic, 2 MG/DOSE, 8 MG/3ML solution pen-injector, INJECT 2 MG UNDER THE SKIN ONE DAY A WEEK, Disp: , Rfl:     ranolazine (RANEXA) 500 MG 12 hr tablet, Take 1 tablet by mouth 2 (Two) Times a Day., Disp: , Rfl:     rosuvastatin (CRESTOR) 40 MG tablet, Take 1 tablet by mouth Daily., Disp: , Rfl:     Specialty Vitamins Products ([place] Matrix 5000) tablet, , Disp: , Rfl:     tretinoin (RETIN-A)  "0.1 % cream, APPLY AT BEDTIME TO AFFECTED AREA ON FACE, Disp: , Rfl:     triamcinolone (KENALOG) 0.1 % ointment, Used twice daily for 2 weeks and then taper to once daily for 2 weeks.  If itching remains well controlled using every other day as needed., Disp: 80 g, Rfl: 1    Triamcinolone Acetonide (NASACORT) 55 MCG/ACT nasal inhaler, 2 sprays into the nostril(s) as directed by provider Daily., Disp: 16.5 g, Rfl: 0    Allergies:   Allergies   Allergen Reactions    Wellbutrin [Bupropion] Rash         1/31/2024     1:13 PM   PHQ-2/PHQ-9 Depression Screening   Little Interest or Pleasure in Doing Things 1-->several days   Feeling Down, Depressed or Hopeless 1-->several days   Trouble Falling or Staying Asleep, or Sleeping Too Much 1-->several days   Feeling Tired or Having Little Energy 0-->not at all   Poor Appetite or Overeating 0-->not at all   Feeling Bad about Yourself - or that You are a Failure or Have Let Yourself or Your Family Down 0-->not at all   Trouble Concentrating on Things, Such as Reading the Newspaper or Watching Television 0-->not at all   Moving or Speaking So Slowly that Other People Could Have Noticed? Or the Opposite - Being So Fidgety 0-->not at all   Thoughts that You Would be Better Off Dead or of Hurting Yourself in Some Way 0-->not at all   PHQ-9: Brief Depression Severity Measure Score 3   If You Checked Off Any Problems, How Difficult Have These Problems Made It For You to Do Your Work, Take Care of Things at Home, or Get Along with Other People? not difficult at all          Objective   Physical Exam:  Vital Signs:   Vitals:    06/06/24 0846   BP: 118/60   Pulse: 72   Temp: 98.6 °F (37 °C)   TempSrc: Temporal   SpO2: 98%   Weight: 81.8 kg (180 lb 6.4 oz)   Height: 152.4 cm (60\")   PainSc: 0-No pain     Body mass index is 35.23 kg/m².     Physical Exam  Vitals and nursing note reviewed. Exam conducted with a chaperone present.   Constitutional:       General: She is not in acute " distress.     Appearance: Normal appearance. She is well-developed and well-groomed. She is not ill-appearing, toxic-appearing or diaphoretic.   HENT:      Head: Normocephalic and atraumatic.      Right Ear: Tympanic membrane, ear canal and external ear normal.      Left Ear: Tympanic membrane, ear canal and external ear normal.      Nose: Mucosal edema and congestion present.      Mouth/Throat:      Lips: Pink.      Mouth: Mucous membranes are moist.      Pharynx: Oropharynx is clear. Uvula midline. No posterior oropharyngeal erythema.   Neck:      Thyroid: No thyroid mass, thyromegaly or thyroid tenderness.   Cardiovascular:      Rate and Rhythm: Normal rate and regular rhythm.      Pulses: Normal pulses.           Dorsalis pedis pulses are 2+ on the right side and 2+ on the left side.        Posterior tibial pulses are 1+ on the right side and 1+ on the left side.      Heart sounds: Normal heart sounds, S1 normal and S2 normal. No murmur heard.  Pulmonary:      Effort: Pulmonary effort is normal. No respiratory distress.      Breath sounds: Normal breath sounds.   Abdominal:      General: Bowel sounds are normal. There is no distension.      Palpations: Abdomen is soft.      Tenderness: There is no abdominal tenderness.   Musculoskeletal:         General: Normal range of motion.      Cervical back: Normal range of motion and neck supple.      Right lower leg: No edema.      Left lower leg: No edema.   Feet:      Right foot:      Protective Sensation: 9 sites tested.  9 sites sensed.      Skin integrity: Skin integrity normal.      Left foot:      Protective Sensation: 9 sites tested.  9 sites sensed.      Skin integrity: Skin integrity normal. Callus present.      Comments: Monofilament testing performed with normal findings.  Lymphadenopathy:      Cervical: No cervical adenopathy.   Skin:     General: Skin is warm and dry.      Capillary Refill: Capillary refill takes less than 2 seconds.      Findings: No rash.    Neurological:      General: No focal deficit present.      Mental Status: She is alert and oriented to person, place, and time.   Psychiatric:         Attention and Perception: Attention and perception normal.         Mood and Affect: Mood and affect normal.         Speech: Speech normal.         Behavior: Behavior normal. Behavior is cooperative.         Thought Content: Thought content normal.         Cognition and Memory: Cognition and memory normal.         Judgment: Judgment normal.         Assessment / Plan    Assessment/Plan:   Diagnoses and all orders for this visit:    1. Annual physical exam (Primary)  -     Comprehensive Metabolic Panel; Future  -     CBC (No Diff); Future  -     Lipid Panel; Future  -     TSH; Future  -     Urinalysis With Culture If Indicated - Urine, Clean Catch; Future    2. Environmental allergies  Assessment & Plan:  Acute exacerbation chronic environmental allergies. Plan to begin trial of Nasacort.    Orders:  -     Triamcinolone Acetonide (NASACORT) 55 MCG/ACT nasal inhaler; 2 sprays into the nostril(s) as directed by provider Daily.  Dispense: 16.5 g; Refill: 0    3. Type 2 diabetes mellitus without complication, without long-term current use of insulin  Assessment & Plan:  Diabetes is stable.   Continue current treatment regimen.  Recommended an ADA diet.  Regular aerobic exercise.  Diabetes will be reassessed in 6 months    Orders:  -     POC Glycosylated Hemoglobin (Hb A1C)           Lab 06/06/24  0854   HEMOGLOBIN A1C 5.0      The patient is here for a health maintenance visit.  Currently, the patient consumes a healthy diet and has an adequate exercise regimen. Screening lab work is ordered.  Immunizations are declined today and vaccine education is provided.  Advice and education is given regarding nutrition, aerobic exercise, routine dental evaluations, routine eye exams, reproductive health, cardiovascular risk reduction, sunscreen use, self skin examination (annual  dermatology evaluations) and seat belt use (general overall safety).  Further recommendations after lab evaluation.  Annual wellness evaluations recommended.       Discussed the nature of the medical condition(s) risks, complications, implications, management, safe and proper use of medications. Encouraged medication compliance, and keeping scheduled follow up appointments with me and any other providers.      RTC if symptoms fail to improve, to ER if symptoms worsen.      *Dictated Utilizing Dragon Dictation   Please note that portions of this note were completed with a voice recognition program.   Part of this note may be an electronic transcription/translation of spoken language to printed text using the Dragon Dictation System. Spelling and/or grammatical errors may exist despite efforts at proofreading.      NOTE TO PATIENT: The 21st Century Cures Act makes medical notes like these available to patients in the interest of transparency. However, be advised this is a medical document. It is intended as peer to peer communication. It is written in medical language and may contain abbreviations or verbiage that are unfamiliar. It may appear blunt or direct. Medical documents are intended to carry relevant information, facts as evident, and the clinical opinion of the practitioner.      JAGDEEP Keen  [facility]

## 2024-06-09 NOTE — ASSESSMENT & PLAN NOTE
Diabetes is stable.   Continue current treatment regimen.  Recommended an ADA diet.  Regular aerobic exercise.  Diabetes will be reassessed in 6 months

## 2024-06-28 RX ORDER — LEVOCETIRIZINE DIHYDROCHLORIDE 5 MG/1
5 TABLET, FILM COATED ORAL EVERY EVENING
Qty: 90 TABLET | Refills: 0 | Status: SHIPPED | OUTPATIENT
Start: 2024-06-28

## 2024-06-28 NOTE — TELEPHONE ENCOUNTER
Rx Refill Note  Requested Prescriptions     Pending Prescriptions Disp Refills    levocetirizine (XYZAL) 5 MG tablet 90 tablet 0     Sig: Take 1 tablet by mouth Every Evening.      Last office visit with prescribing clinician: 6/6/2024   Last telemedicine visit with prescribing clinician: Visit date not found   Next office visit with prescribing clinician: Visit date not found                         Would you like a call back once the refill request has been completed: [] Yes [] No    If the office needs to give you a call back, can they leave a voicemail: [] Yes [] No    Coleen Michael MA  06/28/24, 09:25 EDT

## 2024-07-09 RX ORDER — MIRTAZAPINE 7.5 MG/1
TABLET, FILM COATED ORAL
Qty: 30 TABLET | Refills: 5 | Status: SHIPPED | OUTPATIENT
Start: 2024-07-09

## 2024-08-26 DIAGNOSIS — J30.9 ALLERGIC RHINITIS, UNSPECIFIED SEASONALITY, UNSPECIFIED TRIGGER: ICD-10-CM

## 2024-08-26 DIAGNOSIS — Z76.0 MEDICATION REFILL: ICD-10-CM

## 2024-08-27 RX ORDER — MONTELUKAST SODIUM 10 MG/1
10 TABLET ORAL
Qty: 90 TABLET | Refills: 0 | Status: SHIPPED | OUTPATIENT
Start: 2024-08-27

## 2024-09-12 DIAGNOSIS — Z91.09 ENVIRONMENTAL ALLERGIES: Primary | ICD-10-CM

## 2024-09-13 RX ORDER — LEVOCETIRIZINE DIHYDROCHLORIDE 5 MG/1
5 TABLET, FILM COATED ORAL EVERY EVENING
Qty: 90 TABLET | Refills: 0 | Status: SHIPPED | OUTPATIENT
Start: 2024-09-13

## 2024-11-07 ENCOUNTER — TELEPHONE (OUTPATIENT)
Dept: FAMILY MEDICINE CLINIC | Facility: CLINIC | Age: 54
End: 2024-11-07
Payer: COMMERCIAL

## 2024-11-07 NOTE — TELEPHONE ENCOUNTER
Caller: Teena Jacobson    Relationship: Self    Best call back number: [phone]     What form or medical record are you requesting: PROOF OF PHYSICAL    Who is requesting this form or medical record from you: SELF    How would you like to receive the form or medical records (pick-up, mail, fax): PICK-UP      Timeframe paperwork needed: ASAP    Additional notes: PATIENT DROPPED THE PAPERWORK OFF AT THE  ON 11/6/24 AND IS INQUIRING ON THE STATUS OF THE PAPERWORK FOR PROOF OF HER PHYSICAL PLEASE CALL AND ADVISE.

## 2024-11-08 NOTE — TELEPHONE ENCOUNTER
Patient was given form back and advised we can not complete until she re-establishes with a new PCP.

## 2024-12-05 NOTE — PROGRESS NOTES
Subjective     Chief Complaint  Establish Care    Subjective          Teena Packseun Jacobson is a 54 y.o. female who presents today to [facility] for follow up.    HPI:   History of Present Illness      Ms. Jacobson is a pleasant 54-year-old female presents today to follow-up on chronic medical conditions.     Her past medical history includes hypertension, type 2 diabetes, anxiety and depression, migraines, acne/atopic dermatitis.    Type 2 diabetes -her current prescriptions include Farxiga 10 mg daily and Ozempic 2 mg weekly, and A1c in office today is 5.6%     Hypertension - her blood pressure in office today is 130/86, currently prescribed Coreg 12.5 mg twice daily, lisinopril-HCTZ 10-12.5 mg daily. She reports that her blood pressure is usually better controlled 110-112/70s. She hasn't taken her morning medications yet.    Hyperlipidemia - currently prescribed Crestor 40 mg daily.  Lipids from June were well-controlled. Her cardiologist also started her on Repatha in addition to the Crestor. She is following with [facility]     She notes that she has been having some right elbow pain for the last three weeks she has been very busy lately as she works two jobs     The following portions of the patient's history were reviewed and updated as appropriate: allergies, current medications, past family history, past medical history, past social history, past surgical history and problem list.    Objective     Objective     Allergy:   Allergies   Allergen Reactions    Wellbutrin [Bupropion] Rash        Current Medications:   Current Outpatient Medications   Medication Sig Dispense Refill    alclometasone (ACLOVATE) 0.05 % cream       BD Pen Needle Edita 2nd Gen 32G X 4 MM misc USE 1 PEN NEEDLE FOR WEEKLY INJECTIONS AS DIRECTED      Biotin 5000 MCG capsule       carvedilol (COREG) 12.5 MG tablet Take 1 tablet by mouth 2 (Two) Times a Day.      cefadroxil (DURICEF) 500 MG capsule        chlorhexidine (PERIDEX) 0.12 % solution RINSE WITH 15ML FOR 1 MINUTE AND SPIT OUT AS DIRECTED      Cholecalciferol (Vitamin D) 50 MCG (2000 UT) capsule       Farxiga 10 MG tablet Take 10 mg by mouth Every Morning.      Finerenone (Kerendia) 20 MG tablet       fluconazole (DIFLUCAN) 150 MG tablet TAKE 1 TABLET BY MOUTH FOR YEAST INFECTION, REPEAT IN 3 DAYS IF NOT BETTER      hydrocortisone 2.5 % cream APPLY A THIN LAYER TO THE AFFECTED AREA(S) ON THE FACE TOPICALLY TWICE DAILY FOR 2 WEEKS AS NEEDED FOR FLARES  3    levocetirizine (XYZAL) 5 MG tablet Take 1 tablet by mouth Every Evening. 90 tablet 1    lidocaine (LIDODERM) 5 % APPLY 1 PATCH TO THE AFFECTED AREA AND LEAVE IN PLACE FOR 12 HOURS, THEN REMOVE AND LEAVE OFF FOR 12 HOURS.      lisinopril-hydrochlorothiazide (PRINZIDE,ZESTORETIC) 10-12.5 MG per tablet Take 1 tablet by mouth Daily. 90 tablet 0    mirtazapine (REMERON) 7.5 MG tablet Take 1 tablet by mouth Every Evening. 90 tablet 1    montelukast (SINGULAIR) 10 MG tablet Take 1 tablet by mouth every night at bedtime. 90 tablet 1    multivitamin (THERAGRAN) tablet tablet Multiple Vitamin      nitroglycerin (NITROSTAT) 0.4 MG SL tablet PLACE ONE TABLET UNDER TONGUE FOR CHEST PAIN, MAY REPEAT EVERY 5 MINUTES, AFTER 3 DOSES CALL 911      olopatadine (PATANOL) 0.1 % ophthalmic solution INSTILL 1 DROP INTO BOTH EYES DAILY 5 mL 0    Opzelura 1.5 % cream       Ozempic, 2 MG/DOSE, 8 MG/3ML solution pen-injector INJECT 2 MG UNDER THE SKIN ONE DAY A WEEK      ranolazine (RANEXA) 500 MG 12 hr tablet Take 1 tablet by mouth 2 (Two) Times a Day.      Repatha SureClick solution auto-injector SureClick injection       rosuvastatin (CRESTOR) 40 MG tablet Take 1 tablet by mouth Daily.      tretinoin (RETIN-A) 0.1 % cream APPLY AT BEDTIME TO AFFECTED AREA ON FACE      triamcinolone (KENALOG) 0.1 % ointment Used twice daily for 2 weeks and then taper to once daily for 2 weeks.  If itching remains well controlled using every  other day as needed. 80 g 1    Triamcinolone Acetonide (NASACORT) 55 MCG/ACT nasal inhaler 2 sprays into the nostril(s) as directed by provider Daily. 16.5 g 0    Diclofenac Sodium (VOLTAREN) 1 % gel gel Apply 4 g topically to the appropriate area as directed 4 (Four) Times a Day As Needed (Pain in hands). 50 g 0     No current facility-administered medications for this visit.       Past Medical History:  Past Medical History:   Diagnosis Date    Abnormal ECG 2/5/2020    Allergic 1/1996    Anemia 1/3/88    Anxiety     Breast cancer 6/2009    Surgery, Chemoradiation. Dr. Pinto - [facility]    Cancer     breast, chemo, radiation done    Depression     Diabetes mellitus     Fibroid     H/O mastectomy     Heart murmur 10/21    Hypertension     Migraine headache 02/28/2020       Past Surgical History:  Past Surgical History:   Procedure Laterality Date    BREAST BIOPSY  6/2009    D & C HYSTEROSCOPY ENDOMETRIAL ABLATION DIAGNOSTIC LAPAROSCOPY  2009    Uterine fibroids     MASTECTOMY PARTIAL / LUMPECTOMY Left 2009    POSTPARTUM TUBAL LIGATION  1992       Social History:  Social History     Socioeconomic History    Marital status:    Tobacco Use    Smoking status: Never     Passive exposure: Never    Smokeless tobacco: Never   Vaping Use    Vaping status: Never Used   Substance and Sexual Activity    Alcohol use: Yes     Alcohol/week: 2.0 standard drinks of alcohol     Types: 2 Glasses of wine per week     Comment: Social    Drug use: Never    Sexual activity: Not Currently     Partners: Male     Birth control/protection: Tubal ligation       Family History:  Family History   Problem Relation Age of Onset    Heart disease Father     Emphysema Father     COPD Father     Hypertension Father     Arthritis Father     Diabetes Father     No Known Problems Mother     Hypertension Brother     Hypertension Sister     Bone cancer Paternal Aunt     Breast cancer Paternal Aunt 40    Breast cancer Paternal  "Cousin 40    Ovarian cancer Neg Hx     Uterine cancer Neg Hx     Colon cancer Neg Hx     Osteoporosis Neg Hx     Pancreatic cancer Neg Hx     Prostate cancer Neg Hx          Vital Signs:   /86   Pulse 82   Temp 98.4 °F (36.9 °C) (Infrared)   Ht 152.4 cm (60\")   Wt 81.2 kg (179 lb)   BMI 34.96 kg/m²      Physical Exam:  Physical Exam  Vitals reviewed.   Constitutional:       Appearance: She is not ill-appearing.   Eyes:      Pupils: Pupils are equal, round, and reactive to light.   Cardiovascular:      Rate and Rhythm: Normal rate.      Pulses: Normal pulses.   Pulmonary:      Effort: Pulmonary effort is normal.      Breath sounds: Normal breath sounds.   Neurological:      General: No focal deficit present.      Mental Status: She is alert. Mental status is at baseline.   Psychiatric:         Mood and Affect: Mood normal.         Behavior: Behavior normal.         Thought Content: Thought content normal.         Judgment: Judgment normal.               PHQ-9 Score  PHQ-9 Total Score:      Lab Review  Office Visit on 12/06/2024   Component Date Value Ref Range Status    Hemoglobin A1C 12/06/2024 5.6  4.5 - 5.7 % Final    Lot Number 12/06/2024 [id number]   Final    Expiration Date 12/06/2024 8/8/26   Final        Radiology Results        Assessment / Plan         Assessment and Plan   Diagnoses and all orders for this visit:    1. Essential hypertension (Primary)  Assessment & Plan:  Hypertension is stable and controlled  Continue current treatment regimen.  Dietary sodium restriction.  Weight loss.  Ambulatory blood pressure monitoring.  Blood pressure will be reassessed in 6 months.  Target blood pressure less than 140/90      2. Type 2 diabetes mellitus without complication, without long-term current use of insulin  Assessment & Plan:  Diabetes is stable.   Continue current treatment regimen.  Recommended an ADA diet.  Reminded to get yearly retinal exam.  Diabetes will be reassessed in 6 " months    Orders:  -     POC Glycosylated Hemoglobin (Hb A1C)    3. Anxiety and depression    4. Medication refill  -     montelukast (SINGULAIR) 10 MG tablet; Take 1 tablet by mouth every night at bedtime.  Dispense: 90 tablet; Refill: 1  -     mirtazapine (REMERON) 7.5 MG tablet; Take 1 tablet by mouth Every Evening.  Dispense: 90 tablet; Refill: 1    5. Allergic rhinitis, unspecified seasonality, unspecified trigger  Assessment & Plan:  Continue Xyzal and Singulair    Orders:  -     montelukast (SINGULAIR) 10 MG tablet; Take 1 tablet by mouth every night at bedtime.  Dispense: 90 tablet; Refill: 1  -     levocetirizine (XYZAL) 5 MG tablet; Take 1 tablet by mouth Every Evening.  Dispense: 90 tablet; Refill: 1    6. Lateral epicondylitis of right elbow  Assessment & Plan:  Likely related to overuse. No swelling today to suggest bursitis   Rx voltaren gel and advised to use a brace designed for tennis elbow     Orders:  -     Diclofenac Sodium (VOLTAREN) 1 % gel gel; Apply 4 g topically to the appropriate area as directed 4 (Four) Times a Day As Needed (Pain in hands).  Dispense: 50 g; Refill: 0        Discussed possible differential diagnoses, testing, treatment, recommended non-pharmacological interventions, risks, warning signs to monitor for that would indicate need for follow-up in clinic or ER. If no improvement with these regimens or you have new or worsening symptoms follow-up. Patient verbalizes understanding and agreement with plan of care. Denies further needs or concerns.     Patient was given instructions and counseling regarding her condition and for health maintenance advised.       Health Maintenance  Health Maintenance:   Health Maintenance Due   Topic Date Due    DIABETIC EYE EXAM  08/25/2023        Meds ordered during this visit  New Medications Ordered This Visit   Medications    montelukast (SINGULAIR) 10 MG tablet     Sig: Take 1 tablet by mouth every night at bedtime.     Dispense:  90 tablet      Refill:  1    levocetirizine (XYZAL) 5 MG tablet     Sig: Take 1 tablet by mouth Every Evening.     Dispense:  90 tablet     Refill:  1    mirtazapine (REMERON) 7.5 MG tablet     Sig: Take 1 tablet by mouth Every Evening.     Dispense:  90 tablet     Refill:  1    Diclofenac Sodium (VOLTAREN) 1 % gel gel     Sig: Apply 4 g topically to the appropriate area as directed 4 (Four) Times a Day As Needed (Pain in hands).     Dispense:  50 g     Refill:  0       Meds stopped during this visit:  Medications Discontinued During This Encounter   Medication Reason    metFORMIN (GLUCOPHAGE) 500 MG tablet Patient Reported Not Taking    Specialty Vitamins Products ([place] Matrix 5000) tablet Patient Reported Not Taking    mirtazapine (REMERON) 7.5 MG tablet Reorder    montelukast (SINGULAIR) 10 MG tablet Reorder    levocetirizine (XYZAL) 5 MG tablet Reorder        Visit Diagnoses    ICD-10-CM ICD-9-CM   1. Essential hypertension  I10 401.9   2. Type 2 diabetes mellitus without complication, without long-term current use of insulin  E11.9 250.00   3. Anxiety and depression  F41.9 300.00    F32.A 311   4. Medication refill  Z76.0 V68.1   5. Allergic rhinitis, unspecified seasonality, unspecified trigger  J30.9 477.9   6. Lateral epicondylitis of right elbow  M77.11 726.32       Patient was given instructions and counseling regarding her condition or for health maintenance advice. Please see specific information pulled into the AVS if appropriate.     Follow Up   Return for Next scheduled follow up.          This document has been electronically signed by Johanna Murdock DO   December 6, 2024 11:52 EST    Dictated Utilizing Dragon Dictation: Part of this note may be an electronic transcription/translation of spoken language to printed text using the Dragon Dictation System.    Johanna Murdock D.O.  [facility]

## 2024-12-06 ENCOUNTER — OFFICE VISIT (OUTPATIENT)
Dept: FAMILY MEDICINE CLINIC | Facility: CLINIC | Age: 54
End: 2024-12-06
Payer: COMMERCIAL

## 2024-12-06 VITALS
SYSTOLIC BLOOD PRESSURE: 130 MMHG | BODY MASS INDEX: 35.14 KG/M2 | WEIGHT: 179 LBS | HEIGHT: 60 IN | DIASTOLIC BLOOD PRESSURE: 86 MMHG | HEART RATE: 82 BPM | TEMPERATURE: 98.4 F

## 2024-12-06 DIAGNOSIS — J30.9 ALLERGIC RHINITIS, UNSPECIFIED SEASONALITY, UNSPECIFIED TRIGGER: ICD-10-CM

## 2024-12-06 DIAGNOSIS — Z76.0 MEDICATION REFILL: ICD-10-CM

## 2024-12-06 DIAGNOSIS — F41.9 ANXIETY AND DEPRESSION: Chronic | ICD-10-CM

## 2024-12-06 DIAGNOSIS — I10 ESSENTIAL HYPERTENSION: Primary | Chronic | ICD-10-CM

## 2024-12-06 DIAGNOSIS — M77.11 LATERAL EPICONDYLITIS OF RIGHT ELBOW: ICD-10-CM

## 2024-12-06 DIAGNOSIS — E11.9 TYPE 2 DIABETES MELLITUS WITHOUT COMPLICATION, WITHOUT LONG-TERM CURRENT USE OF INSULIN: Chronic | ICD-10-CM

## 2024-12-06 DIAGNOSIS — F32.A ANXIETY AND DEPRESSION: Chronic | ICD-10-CM

## 2024-12-06 LAB
EXPIRATION DATE: NORMAL
HBA1C MFR BLD: 5.6 % (ref 4.5–5.7)
Lab: NORMAL

## 2024-12-06 RX ORDER — EVOLOCUMAB 140 MG/ML
INJECTION, SOLUTION SUBCUTANEOUS
COMMUNITY
Start: 2024-11-21

## 2024-12-06 RX ORDER — MIRTAZAPINE 7.5 MG/1
7.5 TABLET, FILM COATED ORAL EVERY EVENING
Qty: 90 TABLET | Refills: 1 | Status: SHIPPED | OUTPATIENT
Start: 2024-12-06

## 2024-12-06 RX ORDER — MONTELUKAST SODIUM 10 MG/1
10 TABLET ORAL
Qty: 90 TABLET | Refills: 1 | Status: SHIPPED | OUTPATIENT
Start: 2024-12-06

## 2024-12-06 RX ORDER — LIDOCAINE 50 MG/G
PATCH TOPICAL
COMMUNITY
Start: 2024-11-08

## 2024-12-06 RX ORDER — RUXOLITINIB 15 MG/G
CREAM TOPICAL
COMMUNITY
Start: 2024-11-21

## 2024-12-06 RX ORDER — LEVOCETIRIZINE DIHYDROCHLORIDE 5 MG/1
5 TABLET, FILM COATED ORAL EVERY EVENING
Qty: 90 TABLET | Refills: 1 | Status: SHIPPED | OUTPATIENT
Start: 2024-12-06

## 2024-12-06 NOTE — ASSESSMENT & PLAN NOTE
Hypertension is stable and controlled  Continue current treatment regimen.  Dietary sodium restriction.  Weight loss.  Ambulatory blood pressure monitoring.  Blood pressure will be reassessed in 6 months.  Target blood pressure less than 140/90

## 2024-12-06 NOTE — ASSESSMENT & PLAN NOTE
Diabetes is stable.   Continue current treatment regimen.  Recommended an ADA diet.  Reminded to get yearly retinal exam.  Diabetes will be reassessed in 6 months

## 2024-12-06 NOTE — ASSESSMENT & PLAN NOTE
Likely related to overuse. No swelling today to suggest bursitis   Rx voltaren gel and advised to use a brace designed for tennis elbow

## 2025-01-20 ENCOUNTER — TRANSCRIBE ORDERS (OUTPATIENT)
Dept: ADMINISTRATIVE | Facility: HOSPITAL | Age: 55
End: 2025-01-20
Payer: COMMERCIAL

## 2025-01-20 DIAGNOSIS — I34.0 NONRHEUMATIC MITRAL VALVE INSUFFICIENCY: Primary | ICD-10-CM

## 2025-03-17 ENCOUNTER — OFFICE VISIT (OUTPATIENT)
Dept: OBSTETRICS AND GYNECOLOGY | Facility: CLINIC | Age: 55
End: 2025-03-17
Payer: COMMERCIAL

## 2025-03-17 VITALS
WEIGHT: 178 LBS | DIASTOLIC BLOOD PRESSURE: 80 MMHG | HEIGHT: 60 IN | SYSTOLIC BLOOD PRESSURE: 120 MMHG | BODY MASS INDEX: 34.95 KG/M2

## 2025-03-17 DIAGNOSIS — N39.46 URINARY INCONTINENCE, MIXED: ICD-10-CM

## 2025-03-17 DIAGNOSIS — K59.00 CONSTIPATION, UNSPECIFIED CONSTIPATION TYPE: ICD-10-CM

## 2025-03-17 DIAGNOSIS — Z92.21 HISTORY OF CHEMOTHERAPY: ICD-10-CM

## 2025-03-17 DIAGNOSIS — N94.9 ADNEXAL FULLNESS: ICD-10-CM

## 2025-03-17 DIAGNOSIS — Z85.3 PERSONAL HISTORY OF BREAST CANCER: ICD-10-CM

## 2025-03-17 DIAGNOSIS — Z01.419 WELL WOMAN EXAM WITH ROUTINE GYNECOLOGICAL EXAM: Primary | ICD-10-CM

## 2025-03-17 DIAGNOSIS — N95.1 SYMPTOMATIC MENOPAUSAL OR FEMALE CLIMACTERIC STATES: ICD-10-CM

## 2025-03-17 NOTE — PROGRESS NOTES
"Subjective   Chief Complaint   Patient presents with    Annual Exam     No c/c     Teena Gracia Jacobson is a 55 y.o. year old  menopausal female presenting to be seen for her annual exam.  This past year she has not been on hormone replacement therapy.  There has not been vaginal bleeding in the last 12 months.  Menopausal symptoms are not present.    SEXUAL Hx:  She is not currently sexually active.  In the past year there she has not been sexually active.    Condoms are not needed because she is not sexually active.  She would not like to be screened for STD's at today's exam.  [employer] is painful: n/a  HEALTH Hx:  She exercises regularly: yes.  She wears her seat belt: yes.  She has concerns about domestic violence: no.    OTHER THINGS SHE WANTS TO DISCUSS TODAY:  Nothing else    The following portions of the patient's history were reviewed and updated as appropriate:problem list, current medications, allergies, past family history, past medical history, past social history, and past surgical history.    Social History    Tobacco Use      Smoking status: Never        Passive exposure: Never      Smokeless tobacco: Never      Review of Systems  Constitutional POS: nothing reported    NEG: anorexia or night sweats   Genitourinary POS: Urge incontinence not affecting ADLs    NEG: dysuria or hematuria      Gastointestinal POS: constipation (chronic)    NEG: bloating, change in bowel habits, melena, or reflux symptoms   Integument POS: nothing reported    NEG: moles that are changing in size, shape, color or rashes   Breast POS: nothing reported    NEG: persistent breast lump, skin dimpling, or nipple discharge        Objective   /80 (BP Location: Right arm, Patient Position: Sitting, Cuff Size: Adult)   Ht 152.4 cm (60\")   Wt 80.7 kg (178 lb)   BMI 34.76 kg/m²     General:  well developed; well nourished  no acute distress  mentation appropriate   Skin:  No suspicious lesions seen   Thyroid: " normal to inspection and palpation   Breasts:  Examined in supine position  Symmetric without masses or skin dimpling  Nipples normal without inversion, lesions or discharge  There are no palpable axillary nodes  Mastectomy site well healed without palpable abnormalities - left lumpectomy site    Abdomen: soft, non-tender; no masses  no umbilical or inguinal hernias are present  no hepato-splenomegaly   Pelvis: Clinical staff was present for exam  External genitalia:  normal appearance of the external genitalia including Bartholin's and Sunbrook's glands. Areas of atrophy bilateral on bilateral vulva   :  urethral meatus normal; urethra normal:  Vaginal:  atrophic mucosal changes are present;  Cervix:  normal appearance.  Uterus:  normal size, shape and consistency.  Adnexa:  left adnexal fullness versus bowel  Rectal:  digital rectal exam not performed; anus visually normal appearing.        Assessment   Normal GYN exam in menopause  Constipation   Left adnexal fullness   History of vulvar irritation - vulvar atrophy present - GSM  She is up to date on all relevant gynecologic and colorectal screenings   UUI - affecting ADLs  History of breast cancer s/p left lumpectomy in ~ 2009 (chemoradiation as well)  History of endometrial ablation      Plan   Pap was not done today.  I explained to Teena that the recommendations for Pap smear interval in a low risk patient has lengthened to 3 years time.  I told Teena she still needs to be seen in our office yearly for a full physical including breast and pelvic exam.  She was encouraged to get yearly mammograms.  She should report any palpable breast lump(s) or skin changes regardless of mammographic findings.  I explained to Teena that notification regarding her mammogram results will come from the center performing the study.  Our office will not be routinely calling with mammogram results.  It is her responsibility to make sure that the results from the mammogram are  communicated to her by the breast center.  If she has any questions about the results, she is welcome to call our office anytime.  Reviewed the importance of exercise 2-3 times per week with at least 20-30 minutes of cardio and weight bearing exercises.   Recommend trying colace and miralax as needed for constipation. Reviewed if symptoms do not improve to let me know and may need to move up screening colonoscopy and due GI referral.   Keep scheduled follow up with oncologist.  Reviewed vulvar atrophy is most likely contributing to her irritation on the outside and that she can talk to her oncologist about use of vaginal estrogen cream but in the meantime can try Replens moisturizer over-the-counter let me know if this is not helping.  It appears that she is probably having urge incontinence and may be some stress incontinence.  She declines pelvic floor physical therapy at this time due to time and cost.  Reviewed that there are medications to help with urge incontinence and if she desires to proceed with that to let me know.  Ultrasound needs to be scheduled at her convenience for left adnexal fullness today  Colonoscopy due in ~ 5 years   DEXA ordered today given history of  chemoradiation and absence of menses.   The importance of keeping all planned follow-up and taking all medications as prescribed was emphasized.  Follow up for annual exam in ~ one year    No orders of the defined types were placed in this encounter.       Orders Placed This Encounter   Procedures    DEXA Bone Density Axial     Standing Status:   Future     Expected Date:   3/22/2025     Expiration Date:   3/17/2026     Reason for Exam::   Post-menopausal     Release to patient:   Routine Release [[id number]]     Is patient taking or have taken long term Glucocorticoid (steroids)?:   No     Does the patient have rheumatoid arthritis?:   No     Does the patient have secondary osteoporosis?:   No    US Non-ob Transvaginal     Standing Status:    Future     Expected Date:   4/16/2025     Expiration Date:   3/17/2026     Reason for Exam::   left adnexal fullness, history of breast cancer     Release to patient:   Routine Release [[id number]]         This note was electronically signed.    Elke Epps MD  March 17, 2025

## 2025-03-21 ENCOUNTER — PATIENT ROUNDING (BHMG ONLY) (OUTPATIENT)
Dept: OBSTETRICS AND GYNECOLOGY | Facility: CLINIC | Age: 55
End: 2025-03-21
Payer: COMMERCIAL

## 2025-03-21 ENCOUNTER — TELEPHONE (OUTPATIENT)
Dept: OBSTETRICS AND GYNECOLOGY | Facility: CLINIC | Age: 55
End: 2025-03-21
Payer: COMMERCIAL

## 2025-03-21 NOTE — PROGRESS NOTES
My name is Karina, clinical manager    I am with [facility]  [facility]  [address]  [place] [address] [phone]    I want to officially welcome you to our practice and ask about your recent visit.    Tell me about your visit with us.  What things went well?    We're always looking for ways to make our patients' experiences even better. Do you have recommendations on way we may improve?    Overall were you satisfied with your first visit to our practice?    I appreciate you taking the time to answer a few questions today. Is there anything else I can do for you?    Thank you, and have a great day.

## 2025-03-27 ENCOUNTER — TELEPHONE (OUTPATIENT)
Dept: OBSTETRICS AND GYNECOLOGY | Facility: CLINIC | Age: 55
End: 2025-03-27
Payer: COMMERCIAL

## 2025-03-27 DIAGNOSIS — D25.9 PEDUNCULATED LEIOMYOMA OF UTERUS: Primary | ICD-10-CM

## 2025-03-27 NOTE — TELEPHONE ENCOUNTER
Please apologize to the patient for delayed communication of her ultrasound results.     Her recent ultrasound shows a fibroid coming off of the uterus near the right ovary. It measures about 4 x 4.5 x 3 cm in size. There is nothing we need to do for this unless she has new pain. I recommend we follow this up once in ~ 3 months if that can be scheduled.     Orders Placed This Encounter   Procedures    US Non-ob Transvaginal     Standing Status:   Future     Expected Date:   5/26/2025     Expiration Date:   3/27/2026     Reason for Exam::   follow up pedunculated fibroid     Release to patient:   Routine Release [[id number]]       Thanks, Elke Epps MD

## 2025-03-28 NOTE — TELEPHONE ENCOUNTER
Pt informed and stated understanding.     She would like to know if she should have anything for her fibroid even without pain. She relates that she did have an aching pain in her mid abdomen and wants to know if this could be associated. She has had constipation in the past and has been taking medication for it- miralax and stool softener. Pain went away three days ago and is no longer constipated.. Does she need to wait the three months to see if there is any progression to the fibroid before any treatment would be needed.      Message sent to front office for scheduling

## 2025-03-31 DIAGNOSIS — Z76.0 MEDICATION REFILL: ICD-10-CM

## 2025-03-31 DIAGNOSIS — I10 ESSENTIAL HYPERTENSION: ICD-10-CM

## 2025-03-31 RX ORDER — LISINOPRIL AND HYDROCHLOROTHIAZIDE 10; 12.5 MG/1; MG/1
1 TABLET ORAL DAILY
Qty: 90 TABLET | Refills: 0 | Status: SHIPPED | OUTPATIENT
Start: 2025-03-31 | End: 2025-04-01

## 2025-03-31 NOTE — TELEPHONE ENCOUNTER
Fibroids can be very common and then could have been something that was already there. I have no ultrasounds to compare to prior. Nothing has to be done unless she were to have pain that was affecting day to day function. Please let me know if she has any other questions or concerns.     Thanks, Elke Epps MD

## 2025-04-01 DIAGNOSIS — I10 ESSENTIAL HYPERTENSION: ICD-10-CM

## 2025-04-01 DIAGNOSIS — Z76.0 MEDICATION REFILL: ICD-10-CM

## 2025-04-01 RX ORDER — LISINOPRIL AND HYDROCHLOROTHIAZIDE 10; 12.5 MG/1; MG/1
1 TABLET ORAL DAILY
Qty: 90 TABLET | Refills: 0 | Status: SHIPPED | OUTPATIENT
Start: 2025-04-01

## 2025-04-01 RX ORDER — LISINOPRIL AND HYDROCHLOROTHIAZIDE 10; 12.5 MG/1; MG/1
1 TABLET ORAL DAILY
Qty: 90 TABLET | Refills: 0 | Status: SHIPPED | OUTPATIENT
Start: 2025-04-01 | End: 2025-04-01

## 2025-04-15 ENCOUNTER — OFFICE VISIT (OUTPATIENT)
Age: 55
End: 2025-04-15
Payer: COMMERCIAL

## 2025-04-15 VITALS
DIASTOLIC BLOOD PRESSURE: 74 MMHG | WEIGHT: 182.1 LBS | BODY MASS INDEX: 35.75 KG/M2 | HEART RATE: 84 BPM | SYSTOLIC BLOOD PRESSURE: 122 MMHG | HEIGHT: 60 IN

## 2025-04-15 DIAGNOSIS — E78.5 HYPERLIPIDEMIA LDL GOAL <55: ICD-10-CM

## 2025-04-15 DIAGNOSIS — N18.2 CHRONIC KIDNEY DISEASE, STAGE II (MILD): ICD-10-CM

## 2025-04-15 DIAGNOSIS — I10 PRIMARY HYPERTENSION: Primary | ICD-10-CM

## 2025-04-15 DIAGNOSIS — E78.41 ELEVATED LIPOPROTEIN(A): ICD-10-CM

## 2025-04-15 DIAGNOSIS — E11.9 TYPE 2 DIABETES MELLITUS WITHOUT COMPLICATION, UNSPECIFIED WHETHER LONG TERM INSULIN USE: ICD-10-CM

## 2025-04-15 DIAGNOSIS — I34.0 MILD MITRAL VALVE REGURGITATION: ICD-10-CM

## 2025-04-15 NOTE — PROGRESS NOTES
"    Cardiology Established Patient Note     Name: Teena Jacobson  :   1970  PCP: Johanna Murdock, DO  Date:   04/15/2025  Department: [facility] [place] [facility] CARDIOLOGY  [address]  [facility] [address]  Fax [phone]  Phone [phone]    Chief Complaint:   Chief Complaint   Patient presents with    Hypertension        Subjective     History of Present Illness  Teena Jacobson is a 55 y.o. female who presents today for routine 3-month follow-up and lab review.  Patient reports she is doing well, no new concerning symptoms.  Patient does admit frustration with stalled in her weight loss.  Patient reports she is trying to walk outside more now that the weather is better.      1.  Mitral valve regurgitation   2D echo 2020 for EF 56% mild MR  2.  Hyperlipidemia   FLP 19 2025   Lipoprotein a 2024 171  3.  Diabetes mellitus type 2  4.  Hypertension  5.  Chronic kidney disease stage II      Lab Results   Component Value Date    GLUCOSE 99 2024    BUN 15 2024    CREATININE 0.99 2024    EGFRIFNONA 71 2021    EGFRIFAFRI 86 2021    BCR 15.2 2024    K 4.4 2024    CO2 23.4 2024    CALCIUM 9.6 2024    ALBUMIN 4.4 2024    AST 19 2024    ALT 19 2024     Lab Results   Component Value Date    CHOL 98 2024    CHLPL 149 2020    TRIG 62 2024    HDL 62 (H) 2024    LDL 22 2024      Lab Results   Component Value Date    WBC 5.66 2024    RBC 4.24 2024    HGB 12.4 2024    HCT 37.4 2024    MCV 88.2 2024     2024     Lab Results   Component Value Date    TSH 2.830 2024     Lab Results   Component Value Date    HGBA1C 5.6 2024     No results found for: \"LPACHOL\"   Microalbumin, Urine   Date Value Ref Range Status   06/10/2022 1.2 mg/dL Final           Objective     Vital Signs:  /74 (BP Location: " "Right arm, Patient Position: Sitting, Cuff Size: Adult)   Pulse 84   Ht 152.4 cm (60\")   Wt 82.6 kg (182 lb 1.6 oz)   BMI 35.56 kg/m²   Estimated body mass index is 35.56 kg/m² as calculated from the following:    Height as of this encounter: 152.4 cm (60\").    Weight as of this encounter: 82.6 kg (182 lb 1.6 oz).         Constitutional:       Appearance: Healthy appearance.   Eyes:      Pupils: Pupils are equal, round, and reactive to light.   Pulmonary:      Effort: Pulmonary effort is normal.      Breath sounds: Normal breath sounds.   Cardiovascular:      Normal rate. Regular rhythm.      Murmurs: There is no murmur.   Pulses:     Intact distal pulses.   Abdominal:      General: Bowel sounds are normal.   Skin:     General: Skin is warm and dry.   Neurological:      General: No focal deficit present.      Mental Status: Alert and oriented to person, place and time.               Assessment and Plan     Assessment & Plan  Primary hypertension  Continue outpatient ambulatory blood pressure monitoring  Continue carvedilol 12.5 mg p.o. twice daily  Continue lisinopril-hydrochlorothiazide 10-12.5 mg p.o. daily       Elevated lipoprotein(a)   Continue Repatha       Hyperlipidemia LDL goal <55   Continue Repatha  Continue with every 3 month FLP's       Chronic kidney disease, stage II (mild)  Continue Kerendia and Farxiga  Every 3 month BMP       Type 2 diabetes mellitus without complication, unspecified whether long term insulin use  Continue Ozempic 2 mg subcu q. Weekly, will consider transition to monjaro next visit   Most recent hemoglobin A1c 5.7       Mild mitral valve regurgitation  2d Echo 2027 or with new or worsening symptoms.         Follow Up  Return in about 8 weeks (around 6/10/2025).  Patient is going to finish up her Ozempic supply and make decision on whether she wants to transition to Mounjaro from Ozempic.    JAGDEEP Chakraborty    [facility] Cardiology  "

## 2025-04-28 DIAGNOSIS — E10.9 TYPE 1 DIABETES MELLITUS WITHOUT COMPLICATION: Primary | ICD-10-CM

## 2025-04-28 DIAGNOSIS — Z79.4 TYPE 2 DIABETES MELLITUS WITHOUT COMPLICATION, WITH LONG-TERM CURRENT USE OF INSULIN: ICD-10-CM

## 2025-04-28 DIAGNOSIS — E11.9 TYPE 2 DIABETES MELLITUS WITHOUT COMPLICATION, WITH LONG-TERM CURRENT USE OF INSULIN: ICD-10-CM

## 2025-04-28 RX ORDER — SEMAGLUTIDE 2.68 MG/ML
2 INJECTION, SOLUTION SUBCUTANEOUS WEEKLY
Qty: 9 ML | Refills: 1 | Status: SHIPPED | OUTPATIENT
Start: 2025-04-28 | End: 2025-04-30 | Stop reason: SDUPTHER

## 2025-04-28 NOTE — TELEPHONE ENCOUNTER
Rx Refill Note  Requested Prescriptions     Signed Prescriptions Disp Refills    Ozempic, 2 MG/DOSE, 8 MG/3ML solution pen-injector 9 mL 1     Sig: Inject 2 mg under the skin into the appropriate area as directed 1 (One) Time Per Week.     Authorizing Provider: SHAMAR MARINELLI     Ordering User: KIERRA PALAFOX      Last office visit with prescribing clinician: 1/15/25  Last telemedicine visit with prescribing clinician: Visit date not found   Next office visit with prescribing clinician: 6/11/25                       Pharmacy Info    Last Fill Date:  Rx Written Date:   Prescribed Qty:   Additional Details from Pharmacy:    Patient passed protocols per amy.         Kierra Palafox MA  04/28/25, 15:32 EDT

## 2025-04-30 DIAGNOSIS — E11.9 DIABETES MELLITUS WITHOUT COMPLICATION: Primary | ICD-10-CM

## 2025-04-30 DIAGNOSIS — E11.9 TYPE 2 DIABETES MELLITUS WITHOUT COMPLICATION, WITH LONG-TERM CURRENT USE OF INSULIN: ICD-10-CM

## 2025-04-30 DIAGNOSIS — Z79.4 TYPE 2 DIABETES MELLITUS WITHOUT COMPLICATION, WITH LONG-TERM CURRENT USE OF INSULIN: ICD-10-CM

## 2025-04-30 RX ORDER — SEMAGLUTIDE 2.68 MG/ML
2 INJECTION, SOLUTION SUBCUTANEOUS WEEKLY
Qty: 9 ML | Refills: 1 | Status: SHIPPED | OUTPATIENT
Start: 2025-04-30 | End: 2025-05-05 | Stop reason: SDUPTHER

## 2025-05-05 DIAGNOSIS — E11.9 TYPE 2 DIABETES MELLITUS WITHOUT COMPLICATION, WITH LONG-TERM CURRENT USE OF INSULIN: ICD-10-CM

## 2025-05-05 DIAGNOSIS — E10.9 TYPE 1 DIABETES MELLITUS WITHOUT COMPLICATION: Primary | ICD-10-CM

## 2025-05-05 DIAGNOSIS — Z79.4 TYPE 2 DIABETES MELLITUS WITHOUT COMPLICATION, WITH LONG-TERM CURRENT USE OF INSULIN: ICD-10-CM

## 2025-05-05 RX ORDER — SEMAGLUTIDE 2.68 MG/ML
2 INJECTION, SOLUTION SUBCUTANEOUS WEEKLY
Qty: 9 ML | Refills: 1 | Status: SHIPPED | OUTPATIENT
Start: 2025-05-05

## 2025-05-05 NOTE — TELEPHONE ENCOUNTER
Rx Refill Note  Requested Prescriptions     Signed Prescriptions Disp Refills    Ozempic, 2 MG/DOSE, 8 MG/3ML solution pen-injector 9 mL 1     Sig: Inject 2 mg under the skin into the appropriate area as directed 1 (One) Time Per Week.     Authorizing Provider: SHAMAR MARINELLI     Ordering User: KIERRA PALAFOX      Last office visit with prescribing clinician: 1/15/25  Last telemedicine visit with prescribing clinician: Visit date not found   Next office visit with prescribing clinician: 6/11/25                        Pharmacy Info    Last Fill Date:  Rx Written Date:   Prescribed Qty:   Additional Details from Pharmacy:    Patient passed protocols per amy.         Kierra Palafox MA  05/05/25, 15:24 EDT

## 2025-05-06 DIAGNOSIS — E78.5 HYPERLIPIDEMIA LDL GOAL <100: Primary | ICD-10-CM

## 2025-05-06 DIAGNOSIS — E78.5 HYPERLIPIDEMIA LDL GOAL <100: ICD-10-CM

## 2025-05-06 RX ORDER — EVOLOCUMAB 140 MG/ML
140 INJECTION, SOLUTION SUBCUTANEOUS
Qty: 6 ML | Refills: 1 | Status: SHIPPED | OUTPATIENT
Start: 2025-05-06 | End: 2025-05-06 | Stop reason: SDUPTHER

## 2025-05-06 RX ORDER — EVOLOCUMAB 140 MG/ML
140 INJECTION, SOLUTION SUBCUTANEOUS
Qty: 6 ML | Refills: 1 | Status: SHIPPED | OUTPATIENT
Start: 2025-05-06

## 2025-05-06 NOTE — TELEPHONE ENCOUNTER
Refill request Repatha 140 mg, sent via erx to [facility] [place] [address]

## 2025-06-05 NOTE — PROGRESS NOTES
Cardiology Established Patient Note     Name: Teena Jacobson  :   1970  PCP: Johanna Murdock, DO  Date:   2025  Department: [facility] [place] [facility] CARDIOLOGY  [address]  [facility] [address]  Fax [phone]  Phone [phone]    Chief Complaint:   Chief Complaint   Patient presents with    Hypertension      1.  Mitral valve regurgitation              2D echo 2020 for EF 56% mild MR  2.  Hyperlipidemia              FLP 19 2025              Lipoprotein a 2024 171  3.  Diabetes mellitus type 2?   Hemoglobin A1c in  6.3   Most recent hemoglobin A1c 5.7  4.  Hypertension  5.  Chronic kidney disease stage II    Subjective     History of Present Illness  Teena Jacobson is a 55 y.o. female who presents today for routine follow-up, at last visit patient expressed Frustration with stalled weight loss and was considering transitioning to Mounjaro from Ozempic.  Patient reports she still feels like her weight loss is is stalled but she expresses desire to change her diet to less processed foods and to incorporate exercise.  Patient reports she is keeping her grandkids 3 times a week now and is much more active with them so she is hopeful this will will help.  Patient reports she has not been walking at the mall recently and that she is still working as a pharmacy tech.  No other new symptoms at this visit.  Patient denies chest pain shortness of breath palpitations    Current Outpatient Medications   Medication Instructions    alclometasone (ACLOVATE) 0.05 % cream     BD Pen Needle Edita 2nd Gen 32G X 4 MM misc USE 1 PEN NEEDLE FOR WEEKLY INJECTIONS AS DIRECTED    Biotin 5000 MCG capsule     carvedilol (COREG) 12.5 mg, Oral, 2 Times Daily    cefadroxil (DURICEF) 500 MG capsule     Farxiga 10 mg, Oral, Every Morning    fluconazole (DIFLUCAN) 150 MG tablet TAKE 1 TABLET BY MOUTH FOR YEAST INFECTION, REPEAT IN 3 DAYS IF NOT BETTER     hydrocortisone 2.5 % cream APPLY A THIN LAYER TO THE AFFECTED AREA(S) ON THE FACE TOPICALLY TWICE DAILY FOR 2 WEEKS AS NEEDED FOR FLARES    Kerendia 20 mg, Oral, Daily    levocetirizine (XYZAL) 5 mg, Oral, Every Evening    lisinopril-hydrochlorothiazide (PRINZIDE,ZESTORETIC) 10-12.5 MG per tablet 1 tablet, Oral, Daily    mirtazapine (REMERON) 7.5 mg, Oral, Every Evening    montelukast (SINGULAIR) 10 mg, Oral, Every Night at Bedtime    multivitamin (ONE-DAILY MULTI-VITAMIN PO) 1 tablet, Daily    nitroglycerin (NITROSTAT) 0.4 MG SL tablet PLACE ONE TABLET UNDER TONGUE FOR CHEST PAIN, MAY REPEAT EVERY 5 MINUTES, AFTER 3 DOSES CALL 911    olopatadine (PATANOL) 0.1 % ophthalmic solution INSTILL 1 DROP INTO BOTH EYES DAILY    Opzelura 1.5 % cream     Ozempic (2 MG/DOSE) 2 mg, Subcutaneous, Weekly    ranolazine (RANEXA) 500 mg, Oral, 2 Times Daily    Repatha SureClick 140 mg, Subcutaneous, Every 14 Days    rosuvastatin (CRESTOR) 40 mg, Oral, Daily    tretinoin (RETIN-A) 0.1 % cream APPLY AT BEDTIME TO AFFECTED AREA ON FACE    triamcinolone (KENALOG) 0.1 % ointment Used twice daily for 2 weeks and then taper to once daily for 2 weeks.  If itching remains well controlled using every other day as needed.    Triamcinolone Acetonide (NASACORT) 55 MCG/ACT nasal inhaler 2 sprays, Nasal, Daily        Lab Results   Component Value Date    GLUCOSE 99 06/06/2024    BUN 15 06/06/2024    CREATININE 0.99 06/06/2024    BCR 15.2 06/06/2024    K 4.4 06/06/2024    CO2 23.4 06/06/2024    CALCIUM 9.6 06/06/2024    ALBUMIN 4.4 06/06/2024    AST 19 06/06/2024    ALT 19 06/06/2024     Lab Results   Component Value Date    WBC 5.66 06/06/2024    RBC 4.24 06/06/2024    HGB 12.4 06/06/2024    HCT 37.4 06/06/2024    MCV 88.2 06/06/2024     06/06/2024     Lab Results   Component Value Date    TSH 2.830 06/06/2024     Lab Results   Component Value Date    HGBA1C 5.6 12/06/2024     Lab Results   Component Value Date    CHOL 98 06/06/2024     "CHLPL 149 09/14/2020    TRIG 62 06/06/2024    HDL 62 (H) 06/06/2024    LDL 22 06/06/2024      No results found for: \"LIPOA\"   Microalbumin, Urine   Date Value Ref Range Status   06/10/2022 1.2 mg/dL Final     No results found for: \"MALBCRERATIO\"  eGFR   Date Value Ref Range Status   06/06/2024 67.5 >60.0 mL/min/1.73 Final       Objective     Vital Signs:  /80 (BP Location: Right arm, Patient Position: Sitting)   Pulse 78   Ht 152.4 cm (60\")   Wt 82 kg (180 lb 11.2 oz)   BMI 35.29 kg/m²   Estimated body mass index is 35.29 kg/m² as calculated from the following:    Height as of this encounter: 152.4 cm (60\").    Weight as of this encounter: 82 kg (180 lb 11.2 oz).       Constitutional:       Appearance: Healthy appearance.   Eyes:      Pupils: Pupils are equal, round, and reactive to light.   HENT:      Nose: Nose normal.   Pulmonary:      Effort: Pulmonary effort is normal.      Breath sounds: Normal breath sounds.   Cardiovascular:      Normal rate. Regular rhythm.      Murmurs: There is no murmur.   Pulses:     Intact distal pulses.   Abdominal:      General: Bowel sounds are normal.   Musculoskeletal: Normal range of motion.      Cervical back: Normal range of motion. Skin:     General: Skin is warm and dry.   Neurological:      General: No focal deficit present.      Mental Status: Alert and oriented to person, place and time.      Gait: Gait is intact.               Assessment and Plan     Assessment & Plan  Essential hypertension  Continue to monitor at home  Continue carvedilol 12.5 mg p.o. twice daily  Continue lisinopril hydrochlorothiazide 10-12.5 mg p.o. daily  Orders:    lisinopril-hydrochlorothiazide (PRINZIDE,ZESTORETIC) 10-12.5 MG per tablet; Take 1 tablet by mouth Daily.    CBC (No Diff); Future    Comprehensive Metabolic Panel; Future    Mild mitral valve regurgitation  Revisit next visit       Chronic kidney disease, stage II (mild)  Continue Corinthian 20 mg p.o. daily, every 3 month " BMP  Continue Farxiga 10 mg p.o. daily, discussed with patient to call if she has yeast infections or UTIs  Orders:    Farxiga 10 MG tablet; Take 10 mg by mouth Every Morning.    Finerenone (Kerendia) 20 MG tablet; Take 1 tablet by mouth Daily.    Hyperlipidemia LDL goal <100  Continue Repatha 140 mg subcu every 2 weeks  Continue rosuvastatin 40 mg p.o. daily    Orders:    rosuvastatin (CRESTOR) 40 MG tablet; Take 1 tablet by mouth Daily.    Lipid Panel; Future    Type 2 diabetes mellitus without complication, without long-term current use of insulin  Patient is agreeable to stay on Ozempic at this time, continue Ozempic 2 mg subcu q. Weekly  Discussed with patient incorporating exercise especially following meals to reduce circulating blood sugar  Hemoglobin A1c in 3 months    Orders:    Hemoglobin A1c; Future      Follow Up  Return in about 3 months (around 9/11/2025).    [facility]

## 2025-06-05 NOTE — ASSESSMENT & PLAN NOTE
Continue to monitor at home  Continue carvedilol 12.5 mg p.o. twice daily  Continue lisinopril hydrochlorothiazide 10-12.5 mg p.o. daily  Orders:    lisinopril-hydrochlorothiazide (PRINZIDE,ZESTORETIC) 10-12.5 MG per tablet; Take 1 tablet by mouth Daily.    CBC (No Diff); Future    Comprehensive Metabolic Panel; Future

## 2025-06-11 ENCOUNTER — OFFICE VISIT (OUTPATIENT)
Age: 55
End: 2025-06-11
Payer: COMMERCIAL

## 2025-06-11 ENCOUNTER — PATIENT ROUNDING (BHMG ONLY) (OUTPATIENT)
Age: 55
End: 2025-06-11

## 2025-06-11 ENCOUNTER — PATIENT ROUNDING (BHMG ONLY) (OUTPATIENT)
Age: 55
End: 2025-06-11
Payer: COMMERCIAL

## 2025-06-11 VITALS
HEIGHT: 60 IN | SYSTOLIC BLOOD PRESSURE: 113 MMHG | HEART RATE: 78 BPM | BODY MASS INDEX: 35.47 KG/M2 | WEIGHT: 180.7 LBS | DIASTOLIC BLOOD PRESSURE: 80 MMHG

## 2025-06-11 DIAGNOSIS — I34.0 MILD MITRAL VALVE REGURGITATION: ICD-10-CM

## 2025-06-11 DIAGNOSIS — E78.5 HYPERLIPIDEMIA LDL GOAL <100: ICD-10-CM

## 2025-06-11 DIAGNOSIS — Z76.0 MEDICATION REFILL: ICD-10-CM

## 2025-06-11 DIAGNOSIS — I10 ESSENTIAL HYPERTENSION: Primary | Chronic | ICD-10-CM

## 2025-06-11 DIAGNOSIS — N18.2 CHRONIC KIDNEY DISEASE, STAGE II (MILD): ICD-10-CM

## 2025-06-11 DIAGNOSIS — E11.9 TYPE 2 DIABETES MELLITUS WITHOUT COMPLICATION, WITHOUT LONG-TERM CURRENT USE OF INSULIN: ICD-10-CM

## 2025-06-11 RX ORDER — CARVEDILOL 12.5 MG/1
12.5 TABLET ORAL 2 TIMES DAILY
Qty: 180 TABLET | Refills: 1 | Status: SHIPPED | OUTPATIENT
Start: 2025-06-11

## 2025-06-11 RX ORDER — LISINOPRIL AND HYDROCHLOROTHIAZIDE 10; 12.5 MG/1; MG/1
1 TABLET ORAL DAILY
Qty: 90 TABLET | Refills: 1 | Status: SHIPPED | OUTPATIENT
Start: 2025-06-11

## 2025-06-11 RX ORDER — RANOLAZINE 500 MG/1
500 TABLET, EXTENDED RELEASE ORAL 2 TIMES DAILY
Qty: 180 TABLET | Refills: 1 | Status: SHIPPED | OUTPATIENT
Start: 2025-06-11

## 2025-06-11 RX ORDER — EVOLOCUMAB 140 MG/ML
140 INJECTION, SOLUTION SUBCUTANEOUS
Qty: 6 ML | Refills: 1 | Status: CANCELLED | OUTPATIENT
Start: 2025-06-11

## 2025-06-11 RX ORDER — DAPAGLIFLOZIN 10 MG/1
1 TABLET, FILM COATED ORAL EVERY MORNING
Qty: 90 TABLET | Refills: 1 | Status: SHIPPED | OUTPATIENT
Start: 2025-06-11

## 2025-06-11 RX ORDER — DIPHENOXYLATE HYDROCHLORIDE AND ATROPINE SULFATE 2.5; .025 MG/1; MG/1
1 TABLET ORAL DAILY
COMMUNITY

## 2025-06-11 RX ORDER — NITROGLYCERIN 0.4 MG/1
0.4 TABLET SUBLINGUAL
Qty: 25 TABLET | Refills: 3 | Status: CANCELLED | OUTPATIENT
Start: 2025-06-11

## 2025-06-11 RX ORDER — MIRTAZAPINE 7.5 MG/1
7.5 TABLET, FILM COATED ORAL EVERY EVENING
Qty: 90 TABLET | Refills: 0 | Status: SHIPPED | OUTPATIENT
Start: 2025-06-11 | End: 2025-06-16 | Stop reason: SDUPTHER

## 2025-06-11 RX ORDER — FINERENONE 20 MG/1
20 TABLET, FILM COATED ORAL DAILY
Qty: 90 TABLET | Refills: 1 | Status: SHIPPED | OUTPATIENT
Start: 2025-06-11

## 2025-06-11 RX ORDER — ROSUVASTATIN CALCIUM 40 MG/1
40 TABLET, COATED ORAL DAILY
Qty: 90 TABLET | Refills: 1 | Status: SHIPPED | OUTPATIENT
Start: 2025-06-11

## 2025-06-11 NOTE — ASSESSMENT & PLAN NOTE
Patient is agreeable to stay on Ozempic at this time, continue Ozempic 2 mg subcu q. Weekly  Discussed with patient incorporating exercise especially following meals to reduce circulating blood sugar  Hemoglobin A1c in 3 months    Orders:    Hemoglobin A1c; Future

## 2025-06-11 NOTE — ASSESSMENT & PLAN NOTE
Continue Repatha 140 mg subcu every 2 weeks  Continue rosuvastatin 40 mg p.o. daily    Orders:    rosuvastatin (CRESTOR) 40 MG tablet; Take 1 tablet by mouth Daily.    Lipid Panel; Future

## 2025-06-11 NOTE — ASSESSMENT & PLAN NOTE
Continue Corinthian 20 mg p.o. daily, every 3 month BMP  Continue Farxiga 10 mg p.o. daily, discussed with patient to call if she has yeast infections or UTIs  Orders:    Farxiga 10 MG tablet; Take 10 mg by mouth Every Morning.    Finerenone (Kerendia) 20 MG tablet; Take 1 tablet by mouth Daily.

## 2025-06-16 ENCOUNTER — OFFICE VISIT (OUTPATIENT)
Dept: FAMILY MEDICINE CLINIC | Facility: CLINIC | Age: 55
End: 2025-06-16
Payer: COMMERCIAL

## 2025-06-16 ENCOUNTER — LAB (OUTPATIENT)
Dept: LAB | Facility: HOSPITAL | Age: 55
End: 2025-06-16
Payer: COMMERCIAL

## 2025-06-16 VITALS
TEMPERATURE: 98.6 F | DIASTOLIC BLOOD PRESSURE: 60 MMHG | BODY MASS INDEX: 35.81 KG/M2 | OXYGEN SATURATION: 98 % | HEART RATE: 80 BPM | RESPIRATION RATE: 20 BRPM | HEIGHT: 60 IN | WEIGHT: 182.4 LBS | SYSTOLIC BLOOD PRESSURE: 100 MMHG

## 2025-06-16 DIAGNOSIS — E55.9 VITAMIN D DEFICIENCY: ICD-10-CM

## 2025-06-16 DIAGNOSIS — J30.9 ALLERGIC RHINITIS, UNSPECIFIED SEASONALITY, UNSPECIFIED TRIGGER: ICD-10-CM

## 2025-06-16 DIAGNOSIS — E11.9 TYPE 2 DIABETES MELLITUS WITHOUT COMPLICATION, WITHOUT LONG-TERM CURRENT USE OF INSULIN: Chronic | ICD-10-CM

## 2025-06-16 DIAGNOSIS — Z76.0 MEDICATION REFILL: ICD-10-CM

## 2025-06-16 DIAGNOSIS — Z00.00 ENCOUNTER FOR ANNUAL PHYSICAL EXAM: Primary | ICD-10-CM

## 2025-06-16 DIAGNOSIS — Z23 NEED FOR VACCINATION: ICD-10-CM

## 2025-06-16 DIAGNOSIS — Z00.00 ENCOUNTER FOR ANNUAL PHYSICAL EXAM: ICD-10-CM

## 2025-06-16 LAB
25(OH)D3 SERPL-MCNC: 41.8 NG/ML (ref 30–100)
ALBUMIN SERPL-MCNC: 4.5 G/DL (ref 3.5–5.2)
ALBUMIN/GLOB SERPL: 1.5 G/DL
ALP SERPL-CCNC: 95 U/L (ref 39–117)
ALT SERPL W P-5'-P-CCNC: 22 U/L (ref 1–33)
ANION GAP SERPL CALCULATED.3IONS-SCNC: 11.7 MMOL/L (ref 5–15)
AST SERPL-CCNC: 27 U/L (ref 1–32)
BASOPHILS # BLD AUTO: 0.05 10*3/MM3 (ref 0–0.2)
BASOPHILS NFR BLD AUTO: 0.8 % (ref 0–1.5)
BILIRUB SERPL-MCNC: 0.5 MG/DL (ref 0–1.2)
BUN SERPL-MCNC: 15 MG/DL (ref 6–20)
BUN/CREAT SERPL: 13.3 (ref 7–25)
CALCIUM SPEC-SCNC: 9.6 MG/DL (ref 8.6–10.5)
CHLORIDE SERPL-SCNC: 101 MMOL/L (ref 98–107)
CO2 SERPL-SCNC: 23.3 MMOL/L (ref 22–29)
CREAT SERPL-MCNC: 1.13 MG/DL (ref 0.57–1)
DEPRECATED RDW RBC AUTO: 43.1 FL (ref 37–54)
EGFRCR SERPLBLD CKD-EPI 2021: 57.2 ML/MIN/1.73
EOSINOPHIL # BLD AUTO: 0.08 10*3/MM3 (ref 0–0.4)
EOSINOPHIL NFR BLD AUTO: 1.3 % (ref 0.3–6.2)
ERYTHROCYTE [DISTWIDTH] IN BLOOD BY AUTOMATED COUNT: 12.6 % (ref 12.3–15.4)
EXPIRATION DATE: NORMAL
EXPIRATION DATE: NORMAL
GLOBULIN UR ELPH-MCNC: 3 GM/DL
GLUCOSE SERPL-MCNC: 98 MG/DL (ref 65–99)
HBA1C MFR BLD: 5.3 % (ref 4.5–5.7)
HCT VFR BLD AUTO: 41.6 % (ref 34–46.6)
HGB BLD-MCNC: 13.3 G/DL (ref 12–15.9)
IMM GRANULOCYTES # BLD AUTO: 0.03 10*3/MM3 (ref 0–0.05)
IMM GRANULOCYTES NFR BLD AUTO: 0.5 % (ref 0–0.5)
LYMPHOCYTES # BLD AUTO: 2.08 10*3/MM3 (ref 0.7–3.1)
LYMPHOCYTES NFR BLD AUTO: 34.3 % (ref 19.6–45.3)
Lab: NORMAL
Lab: NORMAL
MCH RBC QN AUTO: 29.8 PG (ref 26.6–33)
MCHC RBC AUTO-ENTMCNC: 32 G/DL (ref 31.5–35.7)
MCV RBC AUTO: 93.3 FL (ref 79–97)
MONOCYTES # BLD AUTO: 0.66 10*3/MM3 (ref 0.1–0.9)
MONOCYTES NFR BLD AUTO: 10.9 % (ref 5–12)
NEUTROPHILS NFR BLD AUTO: 3.16 10*3/MM3 (ref 1.7–7)
NEUTROPHILS NFR BLD AUTO: 52.2 % (ref 42.7–76)
NRBC BLD AUTO-RTO: 0 /100 WBC (ref 0–0.2)
PLATELET # BLD AUTO: 235 10*3/MM3 (ref 140–450)
PMV BLD AUTO: 10.6 FL (ref 6–12)
POC ALBUMIN, URINE: 10 MG/L
POC CREATININE, URINE: 100 MG/DL
POC URINE ALB/CREA RATIO: <30
POTASSIUM SERPL-SCNC: 4.2 MMOL/L (ref 3.5–5.2)
PROT SERPL-MCNC: 7.5 G/DL (ref 6–8.5)
RBC # BLD AUTO: 4.46 10*6/MM3 (ref 3.77–5.28)
SODIUM SERPL-SCNC: 136 MMOL/L (ref 136–145)
TSH SERPL DL<=0.05 MIU/L-ACNC: 3.53 UIU/ML (ref 0.27–4.2)
VIT B12 BLD-MCNC: 924 PG/ML (ref 211–946)
WBC NRBC COR # BLD AUTO: 6.06 10*3/MM3 (ref 3.4–10.8)

## 2025-06-16 PROCEDURE — 99396 PREV VISIT EST AGE 40-64: CPT | Performed by: FAMILY MEDICINE

## 2025-06-16 PROCEDURE — 82570 ASSAY OF URINE CREATININE: CPT | Performed by: FAMILY MEDICINE

## 2025-06-16 PROCEDURE — 99214 OFFICE O/P EST MOD 30 MIN: CPT | Performed by: FAMILY MEDICINE

## 2025-06-16 PROCEDURE — 83036 HEMOGLOBIN GLYCOSYLATED A1C: CPT | Performed by: FAMILY MEDICINE

## 2025-06-16 PROCEDURE — 80050 GENERAL HEALTH PANEL: CPT

## 2025-06-16 PROCEDURE — 82044 UR ALBUMIN SEMIQUANTITATIVE: CPT | Performed by: FAMILY MEDICINE

## 2025-06-16 PROCEDURE — 82607 VITAMIN B-12: CPT

## 2025-06-16 PROCEDURE — 82306 VITAMIN D 25 HYDROXY: CPT

## 2025-06-16 RX ORDER — MIRTAZAPINE 7.5 MG/1
7.5 TABLET, FILM COATED ORAL EVERY EVENING
Qty: 90 TABLET | Refills: 0 | Status: SHIPPED | OUTPATIENT
Start: 2025-06-16

## 2025-06-16 RX ORDER — LEVOCETIRIZINE DIHYDROCHLORIDE 5 MG/1
5 TABLET, FILM COATED ORAL EVERY EVENING
Qty: 90 TABLET | Refills: 1 | Status: CANCELLED | OUTPATIENT
Start: 2025-06-16

## 2025-06-16 NOTE — ASSESSMENT & PLAN NOTE
Annual wellness exam completed today. Health Maintenance including immunizations was updated and reflected in the chart. Yearly screening labs were ordered.     Further recommendations to be given once lab data received.     Health advice: healthy food choices with fresh fruits and vegetables, maintain sleep pattern at least 8 hours, avoid texting and distracted driving practices; wear safety belt, engage in regular exercise, maintain healthy weight, use safe sex practices, avoid alcohol and illicit drugs. Maintain immunizations that are up to date. Maintain health maintenance: Colon cancer screening, DEXA, Mammogram, PAP Smear, etc.  Follow up with PCP if struggling with depression or anxiety. Keep regular dental and eye exams. Brush and floss teeth daily.     I suggest they take a daily multivitamin that is age-appropriate (example women's One-A-Day.)  Patient voiced understanding of these instructions.     Follow up Annually for Physical

## 2025-06-16 NOTE — PROGRESS NOTES
Subjective     Chief Complaint  Annual Exam    Janell Jacobson is a 55 y.o. female who presents today to [facility] for follow up.    HPI:   History of Present Illness    History of Present Illness  The patient is a 55-year-old female who presents for evaluation of diabetes, hyperlipidemia, sleep issues, and health maintenance.    Her past medical history includes hypertension, type 2 diabetes, anxiety and depression, migraines, acne/atopic dermatitis.     She reports a stable blood glucose level, with the most recent reading being 5.3. In December, her blood glucose level was 5.6. She has been tolerating Ozempic and Farxiga well. She was previously on metformin but was taken off it last fall.    She is currently on Repatha and Crestor for her cholesterol management. Her total cholesterol last year was 98, and her LDL was 22. She had a lipid panel done in April. She is on Crestor because she is diabetic.    She reports that her sleep has improved with the use of Remeron. However, she occasionally abstains from taking it due to late-night activities and the subsequent difficulty in waking up the following morning. She also experiences daytime drowsiness and frequent nighttime awakenings to use the restroom. She is reluctant to increase the dosage of Remeron due to these side effects.    She has been advised to start using MiraLAX daily and to take Colace, but she prefers to take docusate sodium instead. She takes vitamin D daily and has a history of taking B12 50,000 once a week for a month through her gynecologist.    She inquires about over-the-counter medications for rhinorrhea that can be taken with her blood pressure medication. She used to take Sudafed as needed but discontinued it upon starting blood pressure medication.    FAMILY HISTORY  She does not have any family history of colon cancer.    The following portions of the patient's history  were reviewed and updated as appropriate: allergies, current medications, past family history, past medical history, past social history, past surgical history and problem list.    Objective     Objective     Allergy:   Allergies   Allergen Reactions    Wellbutrin [Bupropion] Rash        Current Medications:   Current Outpatient Medications   Medication Sig Dispense Refill    alclometasone (ACLOVATE) 0.05 % cream  (Patient taking differently: Apply 1 Application topically to the appropriate area as directed As Needed.)      Biotin 5000 MCG capsule  (Patient taking differently: Take 1 capsule by mouth Daily.)      carvedilol (COREG) 12.5 MG tablet Take 1 tablet by mouth 2 (Two) Times a Day. 180 tablet 1    Farxiga 10 MG tablet Take 10 mg by mouth Every Morning. 90 tablet 1    Finerenone (Kerendia) 20 MG tablet Take 1 tablet by mouth Daily. 90 tablet 1    hydrocortisone 2.5 % cream APPLY A THIN LAYER TO THE AFFECTED AREA(S) ON THE FACE TOPICALLY TWICE DAILY FOR 2 WEEKS AS NEEDED FOR FLARES  3    levocetirizine (XYZAL) 5 MG tablet Take 1 tablet by mouth Every Evening. 90 tablet 1    lisinopril-hydrochlorothiazide (PRINZIDE,ZESTORETIC) 10-12.5 MG per tablet Take 1 tablet by mouth Daily. 90 tablet 1    mirtazapine (REMERON) 7.5 MG tablet Take 1 tablet by mouth Every Evening. 90 tablet 0    montelukast (SINGULAIR) 10 MG tablet Take 1 tablet by mouth every night at bedtime. 90 tablet 1    multivitamin (ONE-DAILY MULTI-VITAMIN PO) Take 1 tablet by mouth Daily.      nitroglycerin (NITROSTAT) 0.4 MG SL tablet PLACE ONE TABLET UNDER TONGUE FOR CHEST PAIN, MAY REPEAT EVERY 5 MINUTES, AFTER 3 DOSES CALL 911      olopatadine (PATANOL) 0.1 % ophthalmic solution INSTILL 1 DROP INTO BOTH EYES DAILY 5 mL 0    Opzelura 1.5 % cream       Ozempic, 2 MG/DOSE, 8 MG/3ML solution pen-injector Inject 2 mg under the skin into the appropriate area as directed 1 (One) Time Per Week. 9 mL 1    ranolazine (RANEXA) 500 MG 12 hr tablet Take 1  tablet by mouth 2 (Two) Times a Day. 180 tablet 1    Repatha SureClick solution auto-injector SureClick injection Inject 1 mL under the skin into the appropriate area as directed Every 14 (Fourteen) Days. 6 mL 1    rosuvastatin (CRESTOR) 40 MG tablet Take 1 tablet by mouth Daily. 90 tablet 1    tretinoin (RETIN-A) 0.1 % cream APPLY AT BEDTIME TO AFFECTED AREA ON FACE      triamcinolone (KENALOG) 0.1 % ointment Used twice daily for 2 weeks and then taper to once daily for 2 weeks.  If itching remains well controlled using every other day as needed. 80 g 1    Triamcinolone Acetonide (NASACORT) 55 MCG/ACT nasal inhaler 2 sprays into the nostril(s) as directed by provider Daily. 16.5 g 0    BD Pen Needle Edita 2nd Gen 32G X 4 MM misc USE 1 PEN NEEDLE FOR WEEKLY INJECTIONS AS DIRECTED (Patient not taking: Reported on 6/16/2025)       No current facility-administered medications for this visit.       Past Medical History:  Past Medical History:   Diagnosis Date    Abnormal ECG 2/5/2020    Allergic 1/1996    Anemia 1/3/88    Angina pectoris     Anxiety     Arthritis     Breast cancer 6/2009    Surgery, Chemoradiation. Dr. Pinto - [facility]    Cancer     breast, chemo, radiation done    Chronic kidney disease, stage II (mild) 6/11/2025    Depression     Diabetes mellitus     Fibroid     Gout     H/O mastectomy     Heart murmur 10/21    Hyperlipidemia LDL goal <100 6/11/2025    Hypertension     Migraine headache 02/28/2020    Mitral regurgitation     Palpitations     Tricuspid regurgitation        Past Surgical History:  Past Surgical History:   Procedure Laterality Date    BREAST BIOPSY  6/2009    D & C HYSTEROSCOPY ENDOMETRIAL ABLATION DIAGNOSTIC LAPAROSCOPY  2009    Uterine fibroids     MASTECTOMY PARTIAL / LUMPECTOMY Left 2009    POSTPARTUM TUBAL LIGATION  1992       Social History:  Social History     Socioeconomic History    Marital status:    Tobacco Use    Smoking status: Never      "Passive exposure: Never    Smokeless tobacco: Never   Vaping Use    Vaping status: Never Used   Substance and Sexual Activity    Alcohol use: Yes     Alcohol/week: 2.0 standard drinks of alcohol     Types: 2 Glasses of wine per week     Comment: Social    Drug use: Never    Sexual activity: Not Currently     Partners: Male     Birth control/protection: Tubal ligation       Family History:  Family History   Problem Relation Age of Onset    Hypertension Mother     Heart disease Father     Emphysema Father     COPD Father     Hypertension Father     Arthritis Father     Diabetes Father     Heart attack Father     Coronary artery disease Father     Hyperlipidemia Father     Hypertension Sister     Hypertension Sister     Diabetes Sister     Hypertension Sister     Hypertension Brother     Hypertension Brother     Bone cancer Paternal Aunt     Breast cancer Paternal Aunt 40    Breast cancer Paternal Cousin 40    Hypertension Paternal Grandmother     Ovarian cancer Neg Hx     Uterine cancer Neg Hx     Colon cancer Neg Hx     Osteoporosis Neg Hx     Pancreatic cancer Neg Hx     Prostate cancer Neg Hx        Vital Signs:   /60 (BP Location: Right arm, Patient Position: Sitting, Cuff Size: Adult)   Pulse 80   Temp 98.6 °F (37 °C) (Temporal)   Resp 20   Ht 152.4 cm (60\")   Wt 82.7 kg (182 lb 6.4 oz)   SpO2 98%   BMI 35.62 kg/m²      Physical Exam:  Physical Exam  Vitals reviewed.   Constitutional:       Appearance: She is not ill-appearing.   Eyes:      Pupils: Pupils are equal, round, and reactive to light.   Cardiovascular:      Rate and Rhythm: Normal rate.      Pulses: Normal pulses.   Pulmonary:      Effort: Pulmonary effort is normal.      Breath sounds: Normal breath sounds.   Neurological:      General: No focal deficit present.      Mental Status: She is alert. Mental status is at baseline.   Psychiatric:         Mood and Affect: Mood normal.         Behavior: Behavior normal.         Thought Content: " Thought content normal.         Judgment: Judgment normal.               PHQ-9 Score  PHQ-9 Total Score:      Lab Review  Office Visit on 06/16/2025   Component Date Value Ref Range Status    Hemoglobin A1C 06/16/2025 5.3  4.5 - 5.7 % Final    Lot Number 06/16/2025 [id number]   Final    Expiration Date 06/16/2025 02/17/2027   Final    POC ALBUMIN, URINE 06/16/2025 10  mg/L Final    POC CREATININE, URINE 06/16/2025 100  mg/dL Final    POC Urine Albumin Creatinine Ratio 06/16/2025 <30  <30 Final    Lot Number 06/16/2025 [id number]   Final    Expiration Date 06/16/2025 08/20/2026   Final        Radiology Results        Assessment / Plan         Assessment and Plan   Diagnoses and all orders for this visit:    1. Encounter for annual physical exam (Primary)  Assessment & Plan:  Annual wellness exam completed today. Health Maintenance including immunizations was updated and reflected in the chart. Yearly screening labs were ordered.     Further recommendations to be given once lab data received.     Health advice: healthy food choices with fresh fruits and vegetables, maintain sleep pattern at least 8 hours, avoid texting and distracted driving practices; wear safety belt, engage in regular exercise, maintain healthy weight, use safe sex practices, avoid alcohol and illicit drugs. Maintain immunizations that are up to date. Maintain health maintenance: Colon cancer screening, DEXA, Mammogram, PAP Smear, etc.  Follow up with PCP if struggling with depression or anxiety. Keep regular dental and eye exams. Brush and floss teeth daily.     I suggest they take a daily multivitamin that is age-appropriate (example women's One-A-Day.)  Patient voiced understanding of these instructions.     Follow up Annually for Physical       Orders:  -     CBC & Differential; Future  -     Comprehensive Metabolic Panel; Future  -     TSH Rfx On Abnormal To Free T4; Future  -     Vitamin B12; Future  -     Vitamin D,25-Hydroxy;  Future    2. Type 2 diabetes mellitus without complication, without long-term current use of insulin  -     POC Glycosylated Hemoglobin (Hb A1C)  -     POC Albumin/Creatinine Ratio Urine    3. Medication refill  -     mirtazapine (REMERON) 7.5 MG tablet; Take 1 tablet by mouth Every Evening.  Dispense: 90 tablet; Refill: 0    4. Need for vaccination  -     Pneumococcal Conjugate Vaccine 20-Valent All; Future    5. Vitamin D deficiency  -     Vitamin D,25-Hydroxy; Future        Assessment & Plan  1. Diabetes Mellitus.  Her blood glucose levels have been consistently well-managed, with an average reading of approximately 105. The most recent A1C is 5.3, indicating excellent control. She is tolerating Ozempic and Farxiga well. She was previously on metformin but was taken off last fall.    2. Hyperlipidemia.  Her total cholesterol and LDL levels were within the normal range during the last assessment in 06/2024, with total cholesterol at 98 and LDL at 22. She is currently on Repatha and Crestor. A lipid panel was conducted in 04/2025.    3. Sleep issues.  She reports that her sleep has improved with Remeron, although she sometimes experiences drowsiness the next day. She wakes up at night to go to the restroom but can go back to sleep. The current dosage of Remeron will be maintained without any changes.    4. Health Maintenance.  She has been advised to receive the pneumonia vaccine, which can be administered during a nurse visit after her vacation. A comprehensive blood workup will be conducted today, including tests for vitamin D and B12 levels. Her colonoscopy is due in 10 years, as per the pathology report.    5. Rhinorrhea.  She has been advised to avoid decongestants due to her blood pressure medication but can use antihistamines such as Zyrtec, Claritin, or Allegra. Coricidin is also recommended as it is safe for individuals with high blood pressure.    Follow-up  The patient will follow up in 6  months.      Discussed possible differential diagnoses, testing, treatment, recommended non-pharmacological interventions, risks, warning signs to monitor for that would indicate need for follow-up in clinic or ER. If no improvement with these regimens or you have new or worsening symptoms follow-up. Patient verbalizes understanding and agreement with plan of care. Denies further needs or concerns.     Patient was given instructions and counseling regarding her condition and for health maintenance advised.            Health Maintenance  Health Maintenance:   Health Maintenance Due   Topic Date Due    Pneumococcal Vaccine 50+ (1 of 2 - PCV) Never done        Meds ordered during this visit  New Medications Ordered This Visit   Medications    mirtazapine (REMERON) 7.5 MG tablet     Sig: Take 1 tablet by mouth Every Evening.     Dispense:  90 tablet     Refill:  0       Meds stopped during this visit:  Medications Discontinued During This Encounter   Medication Reason    fluconazole (DIFLUCAN) 150 MG tablet     cefadroxil (DURICEF) 500 MG capsule     mirtazapine (REMERON) 7.5 MG tablet Reorder        Visit Diagnoses    ICD-10-CM ICD-9-CM   1. Encounter for annual physical exam  Z00.00 V70.0   2. Type 2 diabetes mellitus without complication, without long-term current use of insulin  E11.9 250.00   3. Medication refill  Z76.0 V68.1   4. Need for vaccination  Z23 V05.9   5. Vitamin D deficiency  E55.9 268.9       Patient was given instructions and counseling regarding her condition or for health maintenance advice. Please see specific information pulled into the AVS if appropriate.     Follow Up   Return in about 6 months (around 12/16/2025) for followup DM, HTN .      Patient or patient representative verbalized consent for the use of Ambient Listening during the visit with  Johanna Murdock DO for chart documentation. 6/16/2025  08:03 EDT      This document has been electronically signed by Johanna Murdock DO   June 16, 2025  08:28 EDT    Dictated Utilizing Dragon Dictation: Part of this note may be an electronic transcription/translation of spoken language to printed text using the Dragon Dictation System.    Johanna Murdock D.O.  [facility] [place]

## 2025-06-16 NOTE — LETTER
[facility]  Vaccine Consent Form    Patient Name:  Teena Jacobson  Patient :  1970     Vaccine(s) Ordered    Pneumococcal Conjugate Vaccine 20-Valent All        Screening Checklist  The following questions should be completed prior to vaccination. If you answer “yes” to any question, it does not necessarily mean you should not be vaccinated. It just means we may need to clarify or ask more questions. If a question is unclear, please ask your healthcare provider to explain it.    Yes No   Any fever or moderate to severe illness today (mild illness and/or antibiotic treatment are not contraindications)?     Do you have a history of a serious reaction to any previous vaccinations, such as anaphylaxis, encephalopathy within 7 days, Guillain-Twilight syndrome within 6 weeks, seizure?     Have you received any live vaccine(s) (e.g MMR, QUINTON) or any other vaccines in the last month (to ensure duplicate doses aren't given)?     Do you have an anaphylactic allergy to latex (DTaP, DTaP-IPV, Hep A, Hep B, MenB, RV, Td, Tdap), baker’s yeast (Hep B, HPV), polysorbates (RSV, nirsevimab, PCV 20 and 21, Rotavirrus, Tdap, Shingrix), or gelatin (QUINTON, MMR)?     Do you have an anaphylactic allergy to neomycin (Rabies, QUINTON, MMR, IPV, Hep A), polymyxin B (IPV), or streptomycin (IPV)?      Any cancer, leukemia, AIDS, or other immune system disorder? (QUINTON, MMR, RV)     Do you have a parent, brother, or sister with an immune system problem (if immune competence of vaccine recipient clinically verified, can proceed)? (MMR, QUINTON)     Any recent steroid treatments for >2 weeks, chemotherapy, or radiation treatment? (QUINTON, MMR)     Have you received antibody-containing blood transfusions or IVIG in the past 11 months (recommended interval is dependent on product)? (MMR, QUINTON)     Have you taken antiviral drugs (acyclovir, famciclovir, valacyclovir for QUINTON) in the last 24 or 48 hours, respectively?      Are you pregnant or planning  "to become pregnant within 1 month? (QUINTON, MMR, HPV, IPV, MenB, Abrexvy; For Hep B- refer to Engerix-B; For RSV - Abrysvo is indicated for 32-36 weeks of pregnancy from September to January)     For infants, have you ever been told your child has had intussusception or a medical emergency involving obstruction of the intestine (Rotavirus)? If not for an infant, can skip this question.         *Ordering Physicians/APC should be consulted if \"yes\" is checked by the patient or guardian above.  I have received, read, and understand the Vaccine Information Statement (VIS) for each vaccine ordered.  I have considered my or my child's health status as well as the health status of my close contacts.  I have taken the opportunity to discuss my vaccine questions with my or my child's health care provider.   I have requested that the ordered vaccine(s) be given to me or my child.  I understand the benefits and risks of the vaccines.  I understand that I should remain in the clinic for 15 minutes after receiving the vaccine(s).  _________________________________________________________  Signature of Patient or Parent/Legal Guardian ____________________  Date     "

## 2025-06-17 RX ORDER — LEVOCETIRIZINE DIHYDROCHLORIDE 5 MG/1
5 TABLET, FILM COATED ORAL EVERY EVENING
Qty: 90 TABLET | Refills: 0 | Status: SHIPPED | OUTPATIENT
Start: 2025-06-17

## 2025-06-17 RX ORDER — MONTELUKAST SODIUM 10 MG/1
10 TABLET ORAL
Qty: 90 TABLET | Refills: 0 | Status: SHIPPED | OUTPATIENT
Start: 2025-06-17

## 2025-07-07 ENCOUNTER — TELEPHONE (OUTPATIENT)
Dept: OBSTETRICS AND GYNECOLOGY | Facility: CLINIC | Age: 55
End: 2025-07-07
Payer: COMMERCIAL

## 2025-07-07 DIAGNOSIS — R93.89 ABNORMAL ULTRASOUND: ICD-10-CM

## 2025-07-07 DIAGNOSIS — R19.00 ABDOMINAL MASS, UNSPECIFIED ABDOMINAL LOCATION: Primary | ICD-10-CM

## 2025-07-07 NOTE — TELEPHONE ENCOUNTER
Please apologize to patient for delay in communicating recent pelvic ultrasounds.   Please let her know that previously seen concern for pedunculated fibroid does not have typical features of fibroid and I recommend we order a CT scan to further characterize this mass seen. In addition, the bowel near the left side of the uterus has prominent features and I recommend she have a referral for colonoscopy since it looks like from our records her last one was around 2020. I am placing an order for the CT scan which central scheduling will reach out to her to schedule. I am also placing a referral for colonoscopy. She should know to contact us back if she has not heard about scheduling of both of these things in the next week. Please let me know if she has any questions or concerns.     Orders Placed This Encounter   Procedures    CT Abdomen Pelvis Without Contrast     Standing Status:   Future     Expected Date:   7/12/2025     Expiration Date:   7/7/2026     Will Oral Contrast be needed for this procedure?:   No     Release to patient:   Routine Release [[id number]]     Reason for Exam::   mass near uterus on pelvic ultrasound and prominent appearing bowel    Ambulatory Referral For Screening Colonoscopy     Referral Priority:   Routine     Referral Type:   Diagnostic Medical     Referral Reason:   Specialty Services Required     Referred to Provider:   Long Doyle MD     Requested Specialty:   Gastroenterology     Number of Visits Requested:   1         Thanks, Elke Epps MD

## 2025-07-09 ENCOUNTER — TELEPHONE (OUTPATIENT)
Dept: OBSTETRICS AND GYNECOLOGY | Facility: CLINIC | Age: 55
End: 2025-07-09
Payer: COMMERCIAL

## 2025-07-09 NOTE — TELEPHONE ENCOUNTER
Duplicate encounter. Pt has been called and is wanting to speak directly to Dr. Epps. Message has been put in to Dr. Epps

## 2025-07-09 NOTE — TELEPHONE ENCOUNTER
Pt would like to speak directly with you. She is having concerns about both the CT scan and the colonoscopy and would like for you to call her. Please advise.

## 2025-07-09 NOTE — TELEPHONE ENCOUNTER
Hub staff attempted to follow warm transfer process and was unsuccessful     Caller: Teena Jacobson    Relationship: Self    Best call back number: [phone]    What is the best time to reach you: ANYTIME    Who are you requesting to speak with (clinical staff, provider,  specific staff member): CLINICA    Do you know the name of the person who called: MARYANN    What was the call regarding: MISSED CALL AND WOULD LIKE A CALL BACK REGARDING THIS AS SHE IS CONCERNED BECAUSE SHE HAD A TEST DONE NEXT MONDAY.  PATIENT STATES SHE IS GETTING READY TO GO TO WORK AND WILL BE THERE UNTIL 6PM BUT SHE WILL TRY TO ANSWER THE PHONE.      Is it okay if the provider responds through [employer]: YES

## 2025-07-10 ENCOUNTER — TELEPHONE (OUTPATIENT)
Dept: FAMILY MEDICINE CLINIC | Facility: CLINIC | Age: 55
End: 2025-07-10
Payer: COMMERCIAL

## 2025-07-10 NOTE — TELEPHONE ENCOUNTER
Advised patient that the physical itself only covers preventative. Since they discussed the runny nose we have to bill for a office visit to cover that.

## 2025-07-11 ENCOUNTER — TELEPHONE (OUTPATIENT)
Age: 55
End: 2025-07-11
Payer: COMMERCIAL

## 2025-07-11 NOTE — TELEPHONE ENCOUNTER
Generally considered safe however can cause palpitations in some people.  Advise her to monitor heart rate and blood pressure while initiating medications.

## 2025-07-21 ENCOUNTER — HOSPITAL ENCOUNTER (OUTPATIENT)
Dept: CT IMAGING | Facility: HOSPITAL | Age: 55
Discharge: HOME | End: 2025-07-21
Admitting: OBSTETRICS & GYNECOLOGY
Payer: COMMERCIAL

## 2025-07-21 DIAGNOSIS — R93.89 ABNORMAL ULTRASOUND: ICD-10-CM

## 2025-07-21 DIAGNOSIS — R19.00 ABDOMINAL MASS, UNSPECIFIED ABDOMINAL LOCATION: ICD-10-CM

## 2025-07-21 PROCEDURE — 74176 CT ABD & PELVIS W/O CONTRAST: CPT

## 2025-07-28 DIAGNOSIS — N18.2 CHRONIC KIDNEY DISEASE, STAGE II (MILD): ICD-10-CM

## 2025-07-28 RX ORDER — NITROGLYCERIN 0.4 MG/1
0.4 TABLET SUBLINGUAL
Qty: 25 TABLET | Refills: 1 | Status: SHIPPED | OUTPATIENT
Start: 2025-07-28 | End: 2025-08-04 | Stop reason: SDUPTHER

## 2025-07-28 RX ORDER — FINERENONE 20 MG/1
20 TABLET, FILM COATED ORAL DAILY
Qty: 90 TABLET | Refills: 1 | Status: SHIPPED | OUTPATIENT
Start: 2025-07-28 | End: 2025-07-28

## 2025-07-28 NOTE — TELEPHONE ENCOUNTER
Rx Refill Note  Requested Prescriptions     Pending Prescriptions Disp Refills    nitroglycerin (NITROSTAT) 0.4 MG SL tablet 25 tablet 1     Sig: Place 1 tablet under the tongue Every 5 (Five) Minutes As Needed for Chest Pain. Take no more than 3 doses in 15 minutes.      Last office visit with prescribing clinician: Visit date not found   Last telemedicine visit with prescribing clinician: Visit date not found   Next office visit with prescribing clinician: 9/15/2025                        Pharmacy Info    Last Fill Date:  Rx Written Date:   Prescribed Qty:   Additional Details from Pharmacy:    Patient passed protocols per amy.         Marguerite Landeros MA  07/28/25, 14:09 EDT

## 2025-07-28 NOTE — TELEPHONE ENCOUNTER
Rx Refill Note  Requested Prescriptions     Pending Prescriptions Disp Refills    Finerenone (Kerendia) 20 MG tablet 90 tablet 1     Sig: Take 1 tablet by mouth Daily.      Last office visit with prescribing clinician: Visit date not found   Last telemedicine visit with prescribing clinician: Visit date not found   Next office visit with prescribing clinician: 7/28/2025                        Pharmacy Info    Last Fill Date:  Rx Written Date:   Prescribed Qty:   Additional Details from Pharmacy:    Patient passed protocols per amy.         Marguerite Landeros MA  07/28/25, 14:09 EDT

## 2025-07-29 ENCOUNTER — TELEPHONE (OUTPATIENT)
Dept: OBSTETRICS AND GYNECOLOGY | Facility: CLINIC | Age: 55
End: 2025-07-29
Payer: COMMERCIAL

## 2025-08-04 ENCOUNTER — TELEPHONE (OUTPATIENT)
Age: 55
End: 2025-08-04
Payer: COMMERCIAL

## 2025-08-04 RX ORDER — NITROGLYCERIN 0.4 MG/1
0.4 TABLET SUBLINGUAL
Qty: 25 TABLET | Refills: 1 | Status: SHIPPED | OUTPATIENT
Start: 2025-08-04

## 2025-08-04 RX ORDER — FINERENONE 20 MG/1
1 TABLET, FILM COATED ORAL DAILY
Qty: 90 TABLET | Refills: 1 | Status: SHIPPED | OUTPATIENT
Start: 2025-08-04

## 2025-08-04 RX ORDER — FINERENONE 20 MG/1
1 TABLET, FILM COATED ORAL DAILY
COMMUNITY
Start: 2025-07-28 | End: 2025-08-04 | Stop reason: SDUPTHER

## 2025-08-06 DIAGNOSIS — R93.89 ABNORMAL ULTRASOUND: Primary | ICD-10-CM

## 2025-08-06 DIAGNOSIS — D25.9 PEDUNCULATED LEIOMYOMA OF UTERUS: ICD-10-CM

## 2025-08-11 ENCOUNTER — TELEPHONE (OUTPATIENT)
Age: 55
End: 2025-08-11
Payer: COMMERCIAL

## 2025-08-11 DIAGNOSIS — E78.41 ELEVATED LIPOPROTEIN(A): Primary | ICD-10-CM

## 2025-08-15 ENCOUNTER — LAB (OUTPATIENT)
Dept: LAB | Facility: HOSPITAL | Age: 55
End: 2025-08-15
Payer: COMMERCIAL

## 2025-08-15 DIAGNOSIS — R79.89 ELEVATED SERUM CREATININE: ICD-10-CM

## 2025-08-15 PROCEDURE — 36415 COLL VENOUS BLD VENIPUNCTURE: CPT

## 2025-08-15 PROCEDURE — 80048 BASIC METABOLIC PNL TOTAL CA: CPT

## 2025-08-16 LAB
ANION GAP SERPL CALCULATED.3IONS-SCNC: 13.6 MMOL/L (ref 5–15)
BUN SERPL-MCNC: 14 MG/DL (ref 6–20)
BUN/CREAT SERPL: 11.2 (ref 7–25)
CALCIUM SPEC-SCNC: 9.6 MG/DL (ref 8.6–10.5)
CHLORIDE SERPL-SCNC: 96 MMOL/L (ref 98–107)
CO2 SERPL-SCNC: 23.4 MMOL/L (ref 22–29)
CREAT SERPL-MCNC: 1.25 MG/DL (ref 0.57–1)
EGFRCR SERPLBLD CKD-EPI 2021: 50.7 ML/MIN/1.73
GLUCOSE SERPL-MCNC: 78 MG/DL (ref 65–99)
POTASSIUM SERPL-SCNC: 4.9 MMOL/L (ref 3.5–5.2)
SODIUM SERPL-SCNC: 133 MMOL/L (ref 136–145)

## 2025-08-18 DIAGNOSIS — R79.89 ELEVATED SERUM CREATININE: Primary | ICD-10-CM

## 2025-08-18 RX ORDER — LISINOPRIL 10 MG/1
10 TABLET ORAL DAILY
Qty: 30 TABLET | Refills: 2 | Status: SHIPPED | OUTPATIENT
Start: 2025-08-18